# Patient Record
Sex: MALE | Race: WHITE | NOT HISPANIC OR LATINO | Employment: OTHER | URBAN - METROPOLITAN AREA
[De-identification: names, ages, dates, MRNs, and addresses within clinical notes are randomized per-mention and may not be internally consistent; named-entity substitution may affect disease eponyms.]

---

## 2021-11-08 ENCOUNTER — APPOINTMENT (OUTPATIENT)
Dept: CT IMAGING | Facility: HOSPITAL | Age: 76
End: 2021-11-08

## 2021-11-08 ENCOUNTER — APPOINTMENT (OUTPATIENT)
Dept: GENERAL RADIOLOGY | Facility: HOSPITAL | Age: 76
End: 2021-11-08

## 2021-11-08 ENCOUNTER — HOSPITAL ENCOUNTER (INPATIENT)
Facility: HOSPITAL | Age: 76
LOS: 4 days | Discharge: HOME | End: 2021-11-12
Attending: EMERGENCY MEDICINE | Admitting: INTERNAL MEDICINE

## 2021-11-08 DIAGNOSIS — I48.0 PAROXYSMAL ATRIAL FIBRILLATION WITH RAPID VENTRICULAR RESPONSE (HCC): ICD-10-CM

## 2021-11-08 DIAGNOSIS — R47.9 SPEECH DISTURBANCE, UNSPECIFIED TYPE: ICD-10-CM

## 2021-11-08 DIAGNOSIS — R03.0 ELEVATED BLOOD PRESSURE READING: ICD-10-CM

## 2021-11-08 DIAGNOSIS — G47.34 NOCTURNAL OXYGEN DESATURATION: ICD-10-CM

## 2021-11-08 DIAGNOSIS — I63.9 ACUTE CVA (CEREBROVASCULAR ACCIDENT) (HCC): Primary | ICD-10-CM

## 2021-11-08 PROBLEM — Z86.69 HISTORY OF GLAUCOMA: Status: ACTIVE | Noted: 2021-11-08

## 2021-11-08 PROBLEM — N40.0 BPH (BENIGN PROSTATIC HYPERPLASIA): Status: ACTIVE | Noted: 2021-11-08

## 2021-11-08 LAB
ALBUMIN SERPL-MCNC: 4.3 G/DL (ref 3.5–5.2)
ALBUMIN/GLOB SERPL: 1.7 G/DL
ALP SERPL-CCNC: 79 U/L (ref 39–117)
ALT SERPL W P-5'-P-CCNC: 19 U/L (ref 1–41)
ALT SERPL W P-5'-P-CCNC: 19 U/L (ref 1–41)
ANION GAP SERPL CALCULATED.3IONS-SCNC: 15 MMOL/L (ref 5–15)
APTT PPP: 33.9 SECONDS (ref 22–39)
AST SERPL-CCNC: 23 U/L (ref 1–40)
AST SERPL-CCNC: 26 U/L (ref 1–40)
BASOPHILS # BLD AUTO: 0.09 10*3/MM3 (ref 0–0.2)
BASOPHILS NFR BLD AUTO: 1.2 % (ref 0–1.5)
BILIRUB SERPL-MCNC: 0.7 MG/DL (ref 0–1.2)
BUN SERPL-MCNC: 17 MG/DL (ref 8–23)
BUN/CREAT SERPL: 16.2 (ref 7–25)
CALCIUM SPEC-SCNC: 9 MG/DL (ref 8.6–10.5)
CHLORIDE SERPL-SCNC: 100 MMOL/L (ref 98–107)
CO2 SERPL-SCNC: 22 MMOL/L (ref 22–29)
CREAT SERPL-MCNC: 1.05 MG/DL (ref 0.76–1.27)
DEPRECATED RDW RBC AUTO: 40.4 FL (ref 37–54)
EOSINOPHIL # BLD AUTO: 0.33 10*3/MM3 (ref 0–0.4)
EOSINOPHIL NFR BLD AUTO: 4.4 % (ref 0.3–6.2)
ERYTHROCYTE [DISTWIDTH] IN BLOOD BY AUTOMATED COUNT: 12.3 % (ref 12.3–15.4)
FLUAV RNA RESP QL NAA+PROBE: NOT DETECTED
FLUBV RNA RESP QL NAA+PROBE: NOT DETECTED
GFR SERPL CREATININE-BSD FRML MDRD: 69 ML/MIN/1.73
GLOBULIN UR ELPH-MCNC: 2.5 GM/DL
GLUCOSE BLDC GLUCOMTR-MCNC: 87 MG/DL (ref 70–130)
GLUCOSE BLDC GLUCOMTR-MCNC: 95 MG/DL (ref 70–130)
GLUCOSE SERPL-MCNC: 94 MG/DL (ref 65–99)
HCT VFR BLD AUTO: 44.5 % (ref 37.5–51)
HGB BLD-MCNC: 14.7 G/DL (ref 13–17.7)
HOLD SPECIMEN: NORMAL
IMM GRANULOCYTES # BLD AUTO: 0.02 10*3/MM3 (ref 0–0.05)
IMM GRANULOCYTES NFR BLD AUTO: 0.3 % (ref 0–0.5)
LYMPHOCYTES # BLD AUTO: 2.27 10*3/MM3 (ref 0.7–3.1)
LYMPHOCYTES NFR BLD AUTO: 30.3 % (ref 19.6–45.3)
MCH RBC QN AUTO: 29.8 PG (ref 26.6–33)
MCHC RBC AUTO-ENTMCNC: 33 G/DL (ref 31.5–35.7)
MCV RBC AUTO: 90.3 FL (ref 79–97)
MONOCYTES # BLD AUTO: 0.8 10*3/MM3 (ref 0.1–0.9)
MONOCYTES NFR BLD AUTO: 10.7 % (ref 5–12)
NEUTROPHILS NFR BLD AUTO: 3.97 10*3/MM3 (ref 1.7–7)
NEUTROPHILS NFR BLD AUTO: 53.1 % (ref 42.7–76)
NRBC BLD AUTO-RTO: 0 /100 WBC (ref 0–0.2)
PLATELET # BLD AUTO: 201 10*3/MM3 (ref 140–450)
PMV BLD AUTO: 11.2 FL (ref 6–12)
POTASSIUM SERPL-SCNC: 4.6 MMOL/L (ref 3.5–5.2)
PROT SERPL-MCNC: 6.8 G/DL (ref 6–8.5)
QT INTERVAL: 460 MS
QTC INTERVAL: 427 MS
RBC # BLD AUTO: 4.93 10*6/MM3 (ref 4.14–5.8)
SARS-COV-2 RNA RESP QL NAA+PROBE: NOT DETECTED
SODIUM SERPL-SCNC: 137 MMOL/L (ref 136–145)
TROPONIN T SERPL-MCNC: <0.01 NG/ML (ref 0–0.03)
WBC # BLD AUTO: 7.48 10*3/MM3 (ref 3.4–10.8)
WHOLE BLOOD HOLD SPECIMEN: NORMAL
WHOLE BLOOD HOLD SPECIMEN: NORMAL

## 2021-11-08 PROCEDURE — 71045 X-RAY EXAM CHEST 1 VIEW: CPT

## 2021-11-08 PROCEDURE — 99285 EMERGENCY DEPT VISIT HI MDM: CPT

## 2021-11-08 PROCEDURE — 93005 ELECTROCARDIOGRAM TRACING: CPT | Performed by: NURSE PRACTITIONER

## 2021-11-08 PROCEDURE — 70450 CT HEAD/BRAIN W/O DYE: CPT

## 2021-11-08 PROCEDURE — 84460 ALANINE AMINO (ALT) (SGPT): CPT | Performed by: EMERGENCY MEDICINE

## 2021-11-08 PROCEDURE — 93005 ELECTROCARDIOGRAM TRACING: CPT | Performed by: EMERGENCY MEDICINE

## 2021-11-08 PROCEDURE — 85025 COMPLETE CBC W/AUTO DIFF WBC: CPT | Performed by: EMERGENCY MEDICINE

## 2021-11-08 PROCEDURE — 99222 1ST HOSP IP/OBS MODERATE 55: CPT

## 2021-11-08 PROCEDURE — 82962 GLUCOSE BLOOD TEST: CPT

## 2021-11-08 PROCEDURE — 80053 COMPREHEN METABOLIC PANEL: CPT | Performed by: EMERGENCY MEDICINE

## 2021-11-08 PROCEDURE — 99291 CRITICAL CARE FIRST HOUR: CPT | Performed by: INTERNAL MEDICINE

## 2021-11-08 PROCEDURE — 85730 THROMBOPLASTIN TIME PARTIAL: CPT | Performed by: EMERGENCY MEDICINE

## 2021-11-08 PROCEDURE — 70498 CT ANGIOGRAPHY NECK: CPT

## 2021-11-08 PROCEDURE — 84484 ASSAY OF TROPONIN QUANT: CPT | Performed by: EMERGENCY MEDICINE

## 2021-11-08 PROCEDURE — 84450 TRANSFERASE (AST) (SGOT): CPT | Performed by: EMERGENCY MEDICINE

## 2021-11-08 PROCEDURE — 0 IOPAMIDOL PER 1 ML: Performed by: EMERGENCY MEDICINE

## 2021-11-08 PROCEDURE — 70496 CT ANGIOGRAPHY HEAD: CPT

## 2021-11-08 PROCEDURE — 25010000002 ALTEPLASE PER 1 MG

## 2021-11-08 PROCEDURE — 87636 SARSCOV2 & INF A&B AMP PRB: CPT | Performed by: NURSE PRACTITIONER

## 2021-11-08 PROCEDURE — 0042T HC CT CEREBRAL PERFUSION W/WO CONTRAST: CPT

## 2021-11-08 PROCEDURE — 3E03317 INTRODUCTION OF OTHER THROMBOLYTIC INTO PERIPHERAL VEIN, PERCUTANEOUS APPROACH: ICD-10-PCS

## 2021-11-08 RX ORDER — ASPIRIN 300 MG/1
300 SUPPOSITORY RECTAL DAILY
Status: DISCONTINUED | OUTPATIENT
Start: 2021-11-09 | End: 2021-11-08

## 2021-11-08 RX ORDER — ATORVASTATIN CALCIUM 40 MG/1
80 TABLET, FILM COATED ORAL NIGHTLY
Status: DISCONTINUED | OUTPATIENT
Start: 2021-11-08 | End: 2021-11-10

## 2021-11-08 RX ORDER — ASPIRIN 325 MG
325 TABLET ORAL DAILY
Status: DISCONTINUED | OUTPATIENT
Start: 2021-11-09 | End: 2021-11-08

## 2021-11-08 RX ORDER — SODIUM CHLORIDE 9 MG/ML
100 INJECTION, SOLUTION INTRAVENOUS CONTINUOUS
Status: DISCONTINUED | OUTPATIENT
Start: 2021-11-08 | End: 2021-11-09

## 2021-11-08 RX ORDER — SODIUM CHLORIDE 0.9 % (FLUSH) 0.9 %
10 SYRINGE (ML) INJECTION EVERY 12 HOURS SCHEDULED
Status: DISCONTINUED | OUTPATIENT
Start: 2021-11-08 | End: 2021-11-12 | Stop reason: HOSPADM

## 2021-11-08 RX ORDER — SODIUM CHLORIDE 0.9 % (FLUSH) 0.9 %
10 SYRINGE (ML) INJECTION AS NEEDED
Status: DISCONTINUED | OUTPATIENT
Start: 2021-11-08 | End: 2021-11-11

## 2021-11-08 RX ORDER — SODIUM CHLORIDE 0.9 % (FLUSH) 0.9 %
10 SYRINGE (ML) INJECTION AS NEEDED
Status: DISCONTINUED | OUTPATIENT
Start: 2021-11-08 | End: 2021-11-12 | Stop reason: HOSPADM

## 2021-11-08 RX ORDER — SODIUM CHLORIDE 9 MG/ML
100 INJECTION, SOLUTION INTRAVENOUS CONTINUOUS
Status: DISCONTINUED | OUTPATIENT
Start: 2021-11-08 | End: 2021-11-08

## 2021-11-08 RX ORDER — SODIUM CHLORIDE 9 MG/ML
100 INJECTION, SOLUTION INTRAVENOUS ONCE
Status: DISCONTINUED | OUTPATIENT
Start: 2021-11-08 | End: 2021-11-12

## 2021-11-08 RX ORDER — CLOPIDOGREL BISULFATE 75 MG/1
75 TABLET ORAL DAILY
Status: DISCONTINUED | OUTPATIENT
Start: 2021-11-09 | End: 2021-11-09

## 2021-11-08 RX ORDER — CLOPIDOGREL BISULFATE 75 MG/1
75 TABLET ORAL DAILY
Status: DISCONTINUED | OUTPATIENT
Start: 2021-11-10 | End: 2021-11-08 | Stop reason: SDUPTHER

## 2021-11-08 RX ORDER — ASPIRIN 300 MG/1
300 SUPPOSITORY RECTAL DAILY
Status: DISCONTINUED | OUTPATIENT
Start: 2021-11-09 | End: 2021-11-12 | Stop reason: HOSPADM

## 2021-11-08 RX ORDER — ASPIRIN 81 MG/1
81 TABLET ORAL DAILY
Status: DISCONTINUED | OUTPATIENT
Start: 2021-11-09 | End: 2021-11-12 | Stop reason: HOSPADM

## 2021-11-08 RX ORDER — DIPHENOXYLATE HYDROCHLORIDE AND ATROPINE SULFATE 2.5; .025 MG/1; MG/1
1 TABLET ORAL DAILY
COMMUNITY

## 2021-11-08 RX ADMIN — IOPAMIDOL 125 ML: 755 INJECTION, SOLUTION INTRAVENOUS at 13:21

## 2021-11-08 RX ADMIN — NICARDIPINE HYDROCHLORIDE 5 MG/HR: 0.1 INJECTION, SOLUTION INTRAVENOUS at 13:23

## 2021-11-08 RX ADMIN — ALTEPLASE 73 MG: KIT at 13:41

## 2021-11-08 RX ADMIN — SODIUM CHLORIDE, PRESERVATIVE FREE 10 ML: 5 INJECTION INTRAVENOUS at 20:19

## 2021-11-08 RX ADMIN — ATORVASTATIN CALCIUM 80 MG: 40 TABLET, FILM COATED ORAL at 20:19

## 2021-11-08 RX ADMIN — SODIUM CHLORIDE 100 ML/HR: 9 INJECTION, SOLUTION INTRAVENOUS at 15:05

## 2021-11-08 RX ADMIN — WATER 8 MG: 1 INJECTION INTRAMUSCULAR; INTRAVENOUS; SUBCUTANEOUS at 13:40

## 2021-11-08 NOTE — CASE MANAGEMENT/SOCIAL WORK
Discharge Planning Assessment  [facility]     Patient Name: Torsten Jackson  MRN: [id number]  Today's Date: 11/8/2021    Admit Date: 11/8/2021     Discharge Needs Assessment     Row Name 11/08/21 1352       Living Environment    Lives With spouse    Name(s) of Who Lives With Patient SURAJ Jackson (Spouse) [phone] (Mobile)    Current Living Arrangements home/apartment/condo    Potentially Unsafe Housing Conditions unable to assess    Primary Care Provided by self    Provides Primary Care For no one    Family Caregiver if Needed spouse    Family Caregiver Names SURAJ Jackson (Spouse) [phone] (Mobile)    Quality of Family Relationships helpful; involved; supportive    Able to Return to Prior Arrangements yes       Resource/Environmental Concerns    Resource/Environmental Concerns none    Transportation Concerns car, none       Transition Planning    Patient/Family Anticipates Transition to home    Patient/Family Anticipated Services at Transition none    Transportation Anticipated family or friend will provide       Discharge Needs Assessment    Readmission Within the Last 30 Days no previous admission in last 30 days    Equipment Currently Used at Home none    Concerns to be Addressed denies needs/concerns at this time; no discharge needs identified    Anticipated Changes Related to Illness none    Equipment Needed After Discharge none    Provided Post Acute Provider List? N/A    N/A Provider List Comment no need identified    Provided Post Acute Provider Quality & Resource List? N/A               Discharge Plan     Row Name 11/08/21 2722       Plan    Plan initial    Plan Comments Pt lives with his wife in [facility]. He is independent with ADL's, no DME or outpt needs. There is no medical hx on file. He plans home when ready. PCP is Lauren Shah.    Final Discharge Disposition Code 30 - still a patient              Continued Care and Services - Admitted Since 11/8/2021    Coordination has  not been started for this encounter.          Demographic Summary    No documentation.                Functional Status     Row Name 11/08/21 1352       Functional Status    Usual Activity Tolerance good       Functional Status, IADL    Medications independent    Meal Preparation independent    Housekeeping independent    Laundry independent    Shopping independent       Mental Status    General Appearance WDL WDL       Mental Status Summary    Recent Changes in Mental Status/Cognitive Functioning no changes       Employment/    Employment Status retired               Psychosocial    No documentation.                Abuse/Neglect    No documentation.                Legal    No documentation.                Substance Abuse    No documentation.                Patient Forms    No documentation.                   Cindy Dobbs RN

## 2021-11-08 NOTE — H&P
Intensivist Note     11/8/2021  Hospital Day: 0  * No surgery found *  ICU Stays Timeline            Hospital Admission: 11/08/21 1242 - Current  No ICU stays    No ICU stays to display             Mr. Torsten Jackson, 76 y.o. male is followed for:    Acute CVA manifesting only as expressive aphasia (HCC)    History of glaucoma crisis 2016 resolved post bilateral cataract extraction    BPH (benign prostatic hyperplasia).  Not on therapy       SUBJECTIVE     76-year-old  white male lifelong non-smoker with no prior medical problems except for BPH and glaucoma.  Patient indicates that he was well at 11:30 AM and was getting ready to go to the gym.  Subsequently developed transient dysarthria and expressive aphasia/word salad (wife indicates that she had difficulty understanding him).  Also apparently noted some mild tingling or numbness of the left upper extremity.  Came directly to [facility] ER and by the time of his arrival his symptoms had resolved and NIH was calculated of 0.  Blood pressure was noted however to be 196/86 (he does not have underlying hypertension).  At the time he arrived he had no unilateral weakness, sensory loss, diplopia or other visual abnormalities, loss of balance, and denied headache, nausea, vomiting.    Neurology evaluation in the ER found no focal abnormalities or dysarthria at the time of their exam.  CT the head revealed no acute intracranial findings or evidence of ICH.  CTA revealed irregularities throughout bilateral MCA territories, right M2 and M3 segments, and moderate to severe stenosis and irregularity with near-total occlusion of left M3.  CT perfusion was subsequently obtained revealing a small area of abnormal perfusion/reversible ischemia in the frontoparietal region in the left posterior MCA distribution.  He was an appropriate candidate for TPA and infusion begun at 1340.    I saw the patient approximately 30 to 35 minutes into his TPA infusion and he had developed  "recurrent onset of his expressive aphasia.  His wife indicated that it was not as severe as it had been at home, but it had completely cleared upon arrival in the ER and now had recurred despite TPA.       ROS: Generally negative although difficult to obtain because of his expressive aphasia    PMH:  Medical:  BPH  Glaucoma with crisis 2016    PSH:  Bilateral cataract extraction 2016    No current outpatient medications     No Known Allergies     FH: Mother  at 89.  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        OBJECTIVE     /67   Pulse 62   Resp 17   Ht 189.2 cm (74.5\")   Wt 89.8 kg (198 lb)   SpO2 98%   BMI 25.08 kg/m²           Flowsheet Rows      First Filed Value   Admission Height 189.2 cm (74.5\") Documented at 2021 1221   Admission Weight 89.8 kg (198 lb) Documented at 2021 1221        Intake & Output (last day)     None          Exam:  General Exam:  Well-developed well-nourished white male appearing younger than stated age  HEENT: Right pupil > left (chronic). Nose and throat clear.  Neck:                          Supple, no JVD, thyromegaly, or adenopathy  Lungs: Clear to auscultation and percussion anteriorly and posteriorly.  No wheezes, rales, rhonchi  Cardiovascular: RRR without murmurs or gallops.  HR 76 bpm  Abdomen: Soft nontender without organomegaly or masses.   and rectal: Deferred.  Extremities: No cyanosis clubbing edema.  Neurologic:                 Symmetric strength. No focal deficits.  Expressive aphasia/word salad present    CXR 2021: Normal    CT head 2021: Normal    CTA 2021: Irregularities throughout bilateral MCA territories, right M2 and M3 segments, and moderate to severe stenosis and irregularity with near-total occlusion of left M3    CTP 2021: Abnormal small " area of perfusion/ischemia in the left MCA distribution    EKG 11/8/2021: Normal except for mild left axis    INFUSIONS  niCARdipine, 5-15 mg/hr, Last Rate: Stopped (11/08/21 1434)        Results from last 7 days   Lab Units 11/08/21  1232   WBC 10*3/mm3 7.48   HEMOGLOBIN g/dL 14.7   HEMATOCRIT % 44.5   PLATELETS 10*3/mm3 201             Results from last 7 days   Lab Units 11/08/21  1232   ALT (SGPT) U/L 19   AST (SGOT) U/L 23       No results found for: SEDRATE  No results found for: BNP  Lab Results   Component Value Date    TROPONINT <0.010 11/08/2021     No results found for: TSH  No results found for: LACTATE  No results found for: CORTISOL            I reviewed the patient's results, images and medication.    Assessment/Plan   ASSESSMENT        Acute CVA manifesting only as expressive aphasia (HCC)    History of glaucoma crisis 2016 resolved post bilateral cataract extraction    BPH (benign prostatic hyperplasia).  Not on therapy      DISCUSSION: Has already received TPA by the time I saw him and yet expressive aphasia recurred.  Is being sent for emergent CT scan to rule out bleeding.    PLAN     1.  Status post TPA  2.  Plans will depend on follow-up CT scan  3.  Presume if no response to TPA or he worsens would be evaluated for thrombectomy  4.  Follow-up CT scan 24 hours  5.  In 24 hours start aspirin and Plavix if no intervention required  6.  High-dose statin  7.  Ulcer and DVT prophylaxis  8.  Observe for arrhythmias such as atrial fibrillation    Plan of care and goals reviewed with multidisciplinary team at daily rounds.    I discussed the patient's findings and my recommendations with patient, family, nursing staff and consulting provider    Patient is critically ill due to waxing and waning stroke symptoms despite TPA and has a high risk of life-threatening decline in condition.  They require continuous monitoring and frequent reassessment of condition for adjustment of management in order to  lessen risk.  I visited the patient's bedside and interacted with nurse numerous times today.    Time spent Critical care 60 min (It does not include procedure time).    Electronically signed by Lionel Santos MD, 11/08/21, 2:18 PM EST.   Pulmonary / Critical care medicine

## 2021-11-08 NOTE — ED PROVIDER NOTES
EMERGENCY DEPARTMENT ENCOUNTER    Pt Name: Torsten Jackson  MRN: [id number]  Pt :   1945  Room Number:    Date of encounter:  2021  PCP: Lauren Shah MD  ED Provider: Stoney Swain MD    Historian: Patient      HPI:  Chief Complaint: Speech abnormality and elevated blood pressure        Context: Torsten Jackson is a 76 y.o. male who presents to the ED c/o difficulty with speech starting just 20 minutes prior to arrival in the emergency department.  The patient states that he had a good breakfast including 2 cups of coffee.  He was getting ready to ride his motorcycle to the gym to workout.  He suddenly had difficulty with speech.  He denied any abnormalities in swallowing, ambulation.  He felt as if his left arm was a little different than his right but cannot be more specific.  He has had no loss of coordination of his arm and had no weakness in his hand or arm.  He denies sensory loss of his arm, face, leg.  He feels that his difficulty with speech has mostly resolved but can detect a small amount of difficulty still.    PAST MEDICAL HISTORY  No past medical history on file.      PAST SURGICAL HISTORY  No past surgical history on file.      FAMILY HISTORY  No family history on file.      SOCIAL HISTORY  Social History     Socioeconomic History   • Marital status:          ALLERGIES  Patient has no known allergies.        REVIEW OF SYSTEMS  Review of Systems       All systems reviewed and negative except for those discussed in HPI.       PHYSICAL EXAM    I have reviewed the triage vital signs and nursing notes.    ED Triage Vitals [21 1221]   Temp Heart Rate Resp BP SpO2   -- 55 16 (!) 196/86 98 %      Temp src Heart Rate Source Patient Position BP Location FiO2 (%)   -- Monitor Sitting Left arm --       Physical Exam  GENERAL:   Appears pleasant, younger than stated age appearing.  HENT: Nares patent.  EYES: No scleral icterus.  CV: Regular rhythm, regular rate.  No murmurs  gallops rubs.  No carotid bruits.  RESPIRATORY: Normal effort.  No audible wheezes, rales or rhonchi.  Clear to auscultation  ABDOMEN: Soft, nontender  MUSCULOSKELETAL: No deformities.   NEURO: Alert, moves all extremities, follows commands.  Oriented x3.  Speech is clear.  No aphasia.  Cranial nerves intact.  NIH equals 0.  SKIN: Warm, dry, no rash visualized.        LAB RESULTS  Recent Results (from the past 24 hour(s))   Troponin    Collection Time: 11/08/21 12:32 PM    Specimen: Blood   Result Value Ref Range    Troponin T <0.010 0.000 - 0.030 ng/mL   aPTT    Collection Time: 11/08/21 12:32 PM    Specimen: Blood   Result Value Ref Range    PTT 33.9 22.0 - 39.0 seconds   AST    Collection Time: 11/08/21 12:32 PM    Specimen: Blood   Result Value Ref Range    AST (SGOT) 23 1 - 40 U/L   ALT    Collection Time: 11/08/21 12:32 PM    Specimen: Blood   Result Value Ref Range    ALT (SGPT) 19 1 - 41 U/L   Green Top (Gel)    Collection Time: 11/08/21 12:32 PM   Result Value Ref Range    Extra Tube Hold for add-ons.    Lavender Top    Collection Time: 11/08/21 12:32 PM   Result Value Ref Range    Extra Tube hold for add-on    Gold Top - SST    Collection Time: 11/08/21 12:32 PM   Result Value Ref Range    Extra Tube Hold for add-ons.    Light Blue Top    Collection Time: 11/08/21 12:32 PM   Result Value Ref Range    Extra Tube hold for add-on    CBC Auto Differential    Collection Time: 11/08/21 12:32 PM    Specimen: Blood   Result Value Ref Range    WBC 7.48 3.40 - 10.80 10*3/mm3    RBC 4.93 4.14 - 5.80 10*6/mm3    Hemoglobin 14.7 13.0 - 17.7 g/dL    Hematocrit 44.5 37.5 - 51.0 %    MCV 90.3 79.0 - 97.0 fL    MCH 29.8 26.6 - 33.0 pg    MCHC 33.0 31.5 - 35.7 g/dL    RDW 12.3 12.3 - 15.4 %    RDW-SD 40.4 37.0 - 54.0 fl    MPV 11.2 6.0 - 12.0 fL    Platelets 201 140 - 450 10*3/mm3    Neutrophil % 53.1 42.7 - 76.0 %    Lymphocyte % 30.3 19.6 - 45.3 %    Monocyte % 10.7 5.0 - 12.0 %    Eosinophil % 4.4 0.3 - 6.2 %    Basophil  % 1.2 0.0 - 1.5 %    Immature Grans % 0.3 0.0 - 0.5 %    Neutrophils, Absolute 3.97 1.70 - 7.00 10*3/mm3    Lymphocytes, Absolute 2.27 0.70 - 3.10 10*3/mm3    Monocytes, Absolute 0.80 0.10 - 0.90 10*3/mm3    Eosinophils, Absolute 0.33 0.00 - 0.40 10*3/mm3    Basophils, Absolute 0.09 0.00 - 0.20 10*3/mm3    Immature Grans, Absolute 0.02 0.00 - 0.05 10*3/mm3    nRBC 0.0 0.0 - 0.2 /100 WBC   ECG 12 Lead    Collection Time: 11/08/21  1:21 PM   Result Value Ref Range    QT Interval 460 ms    QTC Interval 427 ms       If labs were ordered, I independently reviewed the results.        RADIOLOGY  CT Head Without Contrast Stroke Protocol    Result Date: 11/8/2021  EXAMINATION: CT HEAD WO CONTRAST STROKE PROTOCOL-  INDICATION: Neuro deficit, acute, stroke suspected.  TECHNIQUE: CT head without intravenous contrast following stroke protocol.  The radiation dose reduction device was turned on for each scan per the ALARA (As Low as Reasonably Achievable) protocol.  COMPARISON: None.  FINDINGS: Midline structures are symmetric without evidence of mass, mass effect or midline shift. Ventricles and sulci within normal limits. No intraaxial hemorrhage or extraaxial fluid collection. Globes and orbits are unremarkable. Paranasal sinuses and mastoid cells demonstrate mucosal edema and fluid within the ethmoid air cells along with mild to moderate mucosal thickening of the right maxillary sinus without air-fluid level present. Mastoid air cells are grossly clear and well pneumatized. Calvarium intact.      1. No acute intracranial finding specifically no acute intracranial hemorrhage. 2. Sinonasal mucosal disease centered within the right maxillary sinus and ethmoid air cells concerning for sinusitis or ethmoiditis.  NOTE: Scan performed on 11/08/2021 at 1238 hours. Scan report given to treatment team in person at scanner by Dr. Summers on 11/08/2021 at 1244 hours.  D:  11/08/2021 E:  11/08/2021       CT CEREBRAL PERFUSION WITH & WITHOUT  CONTRAST    Result Date: 11/8/2021  EXAMINATION: CT CEREBRAL PERFUSION W WO CONTRAST-11/08/2021:  INDICATION: Neuro deficit, acute, stroke suspected; R47.9-Unspecified speech disturbances; R03.0-Elevated blood-pressure reading, without diagnosis of hypertension.  TECHNIQUE: CT cerebral perfusion with and without intravenous contrast administration. Multiple parametric maps including mean transit time, time to drain, cerebral blood flow and cerebral blood volume performed.  The radiation dose reduction device was turned on for each scan per the ALARA (As Low as Reasonably Achievable) protocol.  COMPARISON: CT head noncontrast and concurrent CT angiogram.  FINDINGS: Asymmetric area of abnormal perfusion in the left middle cerebral artery territory posteriorly left frontal parietal involvement with prolongation of mean transit time and time to drain along with decreased cerebral blood flow, however, cerebral blood volume is preserved, likely small area of reversible ischemia without core infarct.      Abnormal perfusion with small area of left posterior MCA distribution frontoparietal region reversible ischemia.  D:  11/08/2021 E:  11/08/2021          I ordered and reviewed the above noted radiographic studies.      I viewed images of head CT which showed no acute bleed or obvious ischemic region per my independent interpretation.    See radiologist's dictation for official interpretation.        PROCEDURES    Critical Care  Performed by: Stoney Swain MD  Authorized by: Stoney Swain MD     Critical care provider statement:     Critical care time (minutes):  35    Critical care time was exclusive of:  Separately billable procedures and treating other patients    Critical care was necessary to treat or prevent imminent or life-threatening deterioration of the following conditions:  CNS failure or compromise    Critical care was time spent personally by me on the following activities:  Ordering and performing  treatments and interventions, ordering and review of laboratory studies, ordering and review of radiographic studies, pulse oximetry, re-evaluation of patient's condition, review of old charts, obtaining history from patient or surrogate, examination of patient, evaluation of patient's response to treatment, discussions with consultants and development of treatment plan with patient or surrogate        ECG 12 Lead   Final Result   Test Reason : Acute Stroke Protocol (onset < 12 hrs)   Blood Pressure :   */*   mmHG   Vent. Rate :  52 BPM     Atrial Rate :  52 BPM      P-R Int : 162 ms          QRS Dur :  90 ms       QT Int : 460 ms       P-R-T Axes :  58 -23  40 degrees      QTc Int : 427 ms      Sinus bradycardia   Possible Left atrial enlargement   Borderline ECG   No previous ECGs available   Confirmed by LEYDI CAPPS MD ([id number]) on 11/8/2021 1:54:42 PM      Referred By: GÉNESIS           Confirmed By: LEYDI CAPPS MD          MEDICATIONS GIVEN IN ER    Medications   sodium chloride 0.9 % flush 10 mL (has no administration in time range)   niCARdipine (CARDENE) 20 mg in 200 mL NS infusion (7.5 mg/hr Intravenous Rate/Dose Change 11/8/21 1332)   alteplase (ACTIVASE) 73 mg in sterile water (preservative free) 73 mL (1 mg/mL) infusion (73 mg Intravenous New Bag 11/8/21 1341)   sodium chloride 0.9 % infusion 100 mL (has no administration in time range)   iopamidol (ISOVUE-370) 76 % injection 150 mL (125 mL Intravenous Given 11/8/21 1321)   alteplase (ACTIVASE) 0.09 mg/kg = 8 mg in sterile water (preservative free) 8 mL bolus (8 mg Intravenous Given 11/8/21 1340)         PROGRESS, DATA ANALYSIS, CONSULTS, AND MEDICAL DECISION MAKING    All labs have been independently reviewed by me.  All radiology studies have been reviewed by me and the radiologist dictating the report.   EKG's have been independently viewed and interpreted by me.      Differential diagnoses: TIA versus CVA, etc.      ED Course as of 11/08/21 3231    Mon Nov 08, 2021   1317 I attended to the patient after being called to triage for patient evaluation.  The patient still has speech abnormalities but are almost completely resolved.  I treated the patient under code stroke protocol.  No IV TPA was administered initially as the patient's symptoms were significantly improved and NIH equals 0 while in the CT scanner. [MS]   1348 The patient's perfusion scan is positive for ischemia.  The case was discussed with Dr. Oquendo and Given who feel IV TPA is indicated at this point and we are currently infusing IV TPA after risks and benefits were discussed with the patient. [MS]   1350 I spoke with Dr. Bean, intensivist who will admit.   [MS]      ED Course User Index  [MS] Stoney Swain MD             AS OF 13:55 EST VITALS:    BP - 175/82  HR - 61  TEMP -    O2 SATS - 95%        DIAGNOSIS  Final diagnoses:   Speech disturbance, unspecified type   Elevated blood pressure reading   Acute CVA (cerebrovascular accident) (HCC)         DISPOSITION  Admission to ICU           Stoney Swain MD  11/08/21 2038

## 2021-11-08 NOTE — PROGRESS NOTES
"Neurology    Referring provider:   No referring provider defined for this encounter.    Reason for Consultation: Aphasia    Chief complaint: Aphasia    History of present illness: 76-year-old man seen for Dr. Santos for expressive aphasia.  This occurred transiently this morning with no receptive component but garbled speech.  He had some mild difficulty walking and mild problems with balance.    He denies headache focal numbness or weakness difficulty with swallowing or vision changes.    His health is otherwise excellent.  He has normal blood pressure denies palpitations no history of migraines.    Takes  no medication.        Review of Systems: Patient has glaucoma.    Home meds:   No medications prior to admission.       History  Past Medical History:   Diagnosis Date   • Glaucoma    , No past surgical history on file., No family history on file.,   Social History     Tobacco Use   • Smoking status: Never Smoker   • Smokeless tobacco: Never Used   Substance Use Topics   • Alcohol use: Not on file   • Drug use: Not on file    and Allergies:  Patient has no known allergies.,    Vital Signs   Blood pressure 148/82, pulse 59, temperature 97.7 °F (36.5 °C), temperature source Oral, resp. rate 16, height 189.2 cm (74.5\"), weight 89.8 kg (198 lb), SpO2 100 %.  Body mass index is 25.08 kg/m².    Physical Exam:   General: Pleasant white male              Head: No trauma              Neck: No bruit              Resp: Normal breath sound              Cor: Regular rhythm              Extremities: No edema              Skin: Warm and dry              Neuro: Mentally alert and oriented with normal memory attention and concentration.    Speech is articulate with no word finding problems.    Coordination is normal finger-nose and fine finger movements.    Reflexes are 1+ and equal bilaterally.    Motor testing shows normal power tone in all muscle groups.    Sensory testing is normal.        Results Review: EKG shows sinus " bradycardia and dilated left atrium.    T angiogram of the head neck showed no large vessel occlusion.  But did show moderate to severe stenosis and irregularity and near-total occlusion of the left M3 segment.  There is also irregularities of the right M2 and M3 segment.  With moderate to severe stenosis.    CT perfusion shows abnormal perfusion with a small amount of left posterior MCA distribution reversible ischemia.  No core infarct is mentioned.    Labs:  Lab Results (last 72 hours)     Procedure Component Value Units Date/Time    [place] Draw [[id number]] Collected: 11/08/21 1232    Specimen: Blood Updated: 11/08/21 1646    Narrative:      The following orders were created for panel order [place] Draw.  Procedure                               Abnormality         Status                     ---------                               -----------         ------                     Green Top (Gel)[[id number]]                                  Final result               Lavender Top[[id number]]                                     Final result               Gold Top - SST[[id number]]                                   Final result               [place] Top[[id number]]                                         Final result               Light Blue Top[[id number]]                                   Final result                 Please view results for these tests on the individual orders.    Gray Top [[id number]] Collected: 11/08/21 1232    Specimen: Blood Updated: 11/08/21 1646     Extra Tube Hold for add-ons.     Comment: Auto resulted.       Comprehensive Metabolic Panel [[id number]] Collected: 11/08/21 1232    Specimen: Blood Updated: 11/08/21 1624     Glucose 94 mg/dL      BUN 17 mg/dL      Creatinine 1.05 mg/dL      Sodium 137 mmol/L      Potassium 4.6 mmol/L      Comment: Slight hemolysis detected by analyzer. Results may be affected.        Chloride 100 mmol/L      CO2 22.0 mmol/L      Calcium 9.0 mg/dL      Total Protein 6.8  g/dL      Albumin 4.30 g/dL      ALT (SGPT) 19 U/L      AST (SGOT) 26 U/L      Alkaline Phosphatase 79 U/L      Total Bilirubin 0.7 mg/dL      eGFR Non African Amer 69 mL/min/1.73      Globulin 2.5 gm/dL      Comment: Calculated Result        A/G Ratio 1.7 g/dL      BUN/Creatinine Ratio 16.2     Anion Gap 15.0 mmol/L     Narrative:      GFR Normal >60  Chronic Kidney Disease <60  Kidney Failure <15      COVID PRE-OP / PRE-PROCEDURE SCREENING ORDER (NO ISOLATION) - Swab, Nasopharynx [[id number]]  (Normal) Collected: 11/08/21 1428    Specimen: Swab from Nasopharynx Updated: 11/08/21 1507    Narrative:      The following orders were created for panel order COVID PRE-OP / PRE-PROCEDURE SCREENING ORDER (NO ISOLATION) - Swab, Nasopharynx.  Procedure                               Abnormality         Status                     ---------                               -----------         ------                     COVID-19 and FLU A/B PCR...[[id number]]  Normal              Final result                 Please view results for these tests on the individual orders.    COVID-19 and FLU A/B PCR - Swab, Nasopharynx [[id number]]  (Normal) Collected: 11/08/21 1428    Specimen: Swab from Nasopharynx Updated: 11/08/21 1507     COVID19 Not Detected     Influenza A PCR Not Detected     Influenza B PCR Not Detected    Narrative:      Fact sheet for providers: https://www.fda.gov/media/326295/download    Fact sheet for patients: https://www.fda.gov/media/934601/download    Test performed by PCR.    AST [[id number]]  (Normal) Collected: 11/08/21 1232    Specimen: Blood Updated: 11/08/21 1352     AST (SGOT) 23 U/L     ALT [[id number]]  (Normal) Collected: 11/08/21 1232    Specimen: Blood Updated: 11/08/21 1352     ALT (SGPT) 19 U/L     Gold Top - SST [[id number]] Collected: 11/08/21 1232    Specimen: Blood Updated: 11/08/21 1345     Extra Tube Hold for add-ons.     Comment: Auto resulted.       Light Blue Top [[id number]] Collected: 11/08/21 1232     Specimen: Blood Updated: 11/08/21 1345     Extra Tube hold for add-on     Comment: Auto resulted       Green Top (Gel) [[id number]] Collected: 11/08/21 1232    Specimen: Blood Updated: 11/08/21 1345     Extra Tube Hold for add-ons.     Comment: Auto resulted.       Lavender Top [[id number]] Collected: 11/08/21 1232    Specimen: Blood Updated: 11/08/21 1345     Extra Tube hold for add-on     Comment: Auto resulted       Troponin [[id number]]  (Normal) Collected: 11/08/21 1232    Specimen: Blood Updated: 11/08/21 1309     Troponin T <0.010 ng/mL     Narrative:      Troponin T Reference Range:  <= 0.03 ng/mL-   Negative for AMI  >0.03 ng/mL-     Abnormal for myocardial necrosis.  Clinicians would have to utilize clinical acumen, EKG, Troponin and serial changes to determine if it is an Acute Myocardial Infarction or myocardial injury due to an underlying chronic condition.       Results may be falsely decreased if patient taking Biotin.      aPTT [[id number]]  (Normal) Collected: 11/08/21 1232    Specimen: Blood Updated: 11/08/21 1309     PTT 33.9 seconds     Narrative:      PTT = The equivalent PTT values for the therapeutic range of heparin levels at 0.3 to 0.5 U/ml are 55 to 70 seconds.    CBC & Differential [[id number]]  (Normal) Collected: 11/08/21 1232    Specimen: Blood Updated: 11/08/21 1250    Narrative:      The following orders were created for panel order CBC & Differential.  Procedure                               Abnormality         Status                     ---------                               -----------         ------                     CBC Auto Differential[[id number]]        Normal              Final result                 Please view results for these tests on the individual orders.    CBC Auto Differential [[id number]]  (Normal) Collected: 11/08/21 1232    Specimen: Blood Updated: 11/08/21 1250     WBC 7.48 10*3/mm3      RBC 4.93 10*6/mm3      Hemoglobin 14.7 g/dL      Hematocrit 44.5 %      MCV  90.3 fL      MCH 29.8 pg      MCHC 33.0 g/dL      RDW 12.3 %      RDW-SD 40.4 fl      MPV 11.2 fL      Platelets 201 10*3/mm3      Neutrophil % 53.1 %      Lymphocyte % 30.3 %      Monocyte % 10.7 %      Eosinophil % 4.4 %      Basophil % 1.2 %      Immature Grans % 0.3 %      Neutrophils, Absolute 3.97 10*3/mm3      Lymphocytes, Absolute 2.27 10*3/mm3      Monocytes, Absolute 0.80 10*3/mm3      Eosinophils, Absolute 0.33 10*3/mm3      Basophils, Absolute 0.09 10*3/mm3      Immature Grans, Absolute 0.02 10*3/mm3      nRBC 0.0 /100 WBC           Rads:  Imaging Results (Last 72 Hours)     Procedure Component Value Units Date/Time    CT Head Without Contrast [[id number]] Collected: 11/08/21 1418     Updated: 11/08/21 1430    Narrative:      EXAMINATION: CT HEAD WO CONTRAST-11/08/2021:      INDICATION: TPA; I63.9-Cerebral infarction, unspecified;  R47.9-Unspecified speech disturbances; R03.0-Elevated blood-pressure  reading, without diagnosis of hypertension.      TECHNIQUE: CT head without intravenous contrast.     The radiation dose reduction device was turned on for each scan per the  ALARA (As Low as Reasonably Achievable) protocol.     COMPARISON: CT angiogram and perfusion performed earlier same day.     FINDINGS: The midline structures are symmetric without evidence of mass,  mass effect or midline shift. No intra-axial hemorrhage or extra-axial  fluid collection. Status post TPA administration. Globes and orbits are  unremarkable. Sinonasal mucosal disease again noted. Calvarium intact.       Impression:      No interval change or acute findings specifically, no  intracranial hemorrhage. Status post TPA administration. No midline  shift or hydrocephalus.     D:  11/08/2021  E:  11/08/2021             XR Chest 1 View [[id number]] Collected: 11/08/21 1415     Updated: 11/08/21 1423    Narrative:      EXAMINATION: XR CHEST 1 VW- 11/08/2021     INDICATION: Acute Stroke Protocol (onset < 12 hrs);  I63.9-Cerebral  infarction, unspecified; R47.9-Unspecified speech disturbances;  R03.0-Elevated blood-pressure reading, without diagnosis of hypertension        COMPARISON: NONE     FINDINGS: Single view of the chest reveals cardiac size within normal  limits. Pulmonary vascularity within normal limits. No focal  opacification or consolidation. No pneumothorax or pleural effusion. No  acute osseous findings.       Impression:      No acute cardiopulmonary process.     D: 11/08/2021  E: 11/08/2021          CT Angiogram Head w AI Analysis of LVO [[id number]] Collected: 11/08/21 1349     Updated: 11/08/21 1414    Narrative:      EXAMINATION: CT ANGIOGRAM HEAD W AI ANALYSIS OF LVO-, CT ANGIOGRAM  NECK-11/08/2021:      INDICATION: Acute Stroke; R47.9-Unspecified speech disturbances;  R03.0-Elevated blood-pressure reading, without diagnosis of  hypertension.      TECHNIQUE: CT angiogram head and neck with and without intravenous  contrast administration.     The radiation dose reduction device was turned on for each scan per the  ALARA (As Low as Reasonably Achievable) protocol.     AI analysis of LVO was utilized.     COMPARISON: CT cerebral perfusion and CT head, noncontrast, stroke  protocol head.     FINDINGS:      CTA NECK: Normal 3-vessel arch with patent great vessel origins.  Proximal subclavian arteries are patent. Vertebral arteries demonstrate  a right-sided dominance of caliber without focal severe stenosis,  aneurysm or occlusion. The carotids demonstrate grossly normal course  and branching pattern with mild-to-moderate atherosclerotic plaque  formation and calcifications as there is 10% right and 30% left luminal  narrowing as measured by NASCET criteria with patency of the distal  internal carotid arteries through the intracranial portions discussed  further below. Cervical soft tissue is unremarkable. The thyroid is  homogeneous in attenuation. Lung apices are grossly clear apart from  mild biapical  pleural-parenchymal scarring and chronic change.      CTA HEAD: Distal internal carotid arteries demonstrate mild-to-moderate  atherosclerotic calcific disease, left slightly greater than right,  without distinct occlusion. Middle cerebral arteries demonstrate  irregularities throughout the bilateral MCA territories, right M2 and M3  segments, moderate-to-severe stenoses and irregularity along with area  of severe stenosis and near total occlusion left M3 segment, for example  series 9-image 59. Vertebrobasilar system and posterior cerebral  arteries demonstrate mild irregularities of atherosclerotic disease  without focal severe stenosis, aneurysm or occlusion. Venous structures  unremarkable with superior sagittal sinus grossly patent.       Impression:      Although no large vessel occlusion, middle cerebral arteries  demonstrate irregularities throughout the bilateral MCA territories,  right M2 and M3 segments, moderate-to-severe stenoses and irregularity  along with area of severe stenosis and near total occlusion left M3  segment, for example series 9- image 59.       D:  11/08/2021  E:  11/08/2021          CT Angiogram Neck [[id number]] Collected: 11/08/21 1349     Updated: 11/08/21 1414    Narrative:      EXAMINATION: CT ANGIOGRAM HEAD W AI ANALYSIS OF LVO-, CT ANGIOGRAM  NECK-11/08/2021:      INDICATION: Acute Stroke; R47.9-Unspecified speech disturbances;  R03.0-Elevated blood-pressure reading, without diagnosis of  hypertension.      TECHNIQUE: CT angiogram head and neck with and without intravenous  contrast administration.     The radiation dose reduction device was turned on for each scan per the  ALARA (As Low as Reasonably Achievable) protocol.     AI analysis of LVO was utilized.     COMPARISON: CT cerebral perfusion and CT head, noncontrast, stroke  protocol head.     FINDINGS:      CTA NECK: Normal 3-vessel arch with patent great vessel origins.  Proximal subclavian arteries are patent. Vertebral  arteries demonstrate  a right-sided dominance of caliber without focal severe stenosis,  aneurysm or occlusion. The carotids demonstrate grossly normal course  and branching pattern with mild-to-moderate atherosclerotic plaque  formation and calcifications as there is 10% right and 30% left luminal  narrowing as measured by NASCET criteria with patency of the distal  internal carotid arteries through the intracranial portions discussed  further below. Cervical soft tissue is unremarkable. The thyroid is  homogeneous in attenuation. Lung apices are grossly clear apart from  mild biapical pleural-parenchymal scarring and chronic change.      CTA HEAD: Distal internal carotid arteries demonstrate mild-to-moderate  atherosclerotic calcific disease, left slightly greater than right,  without distinct occlusion. Middle cerebral arteries demonstrate  irregularities throughout the bilateral MCA territories, right M2 and M3  segments, moderate-to-severe stenoses and irregularity along with area  of severe stenosis and near total occlusion left M3 segment, for example  series 9-image 59. Vertebrobasilar system and posterior cerebral  arteries demonstrate mild irregularities of atherosclerotic disease  without focal severe stenosis, aneurysm or occlusion. Venous structures  unremarkable with superior sagittal sinus grossly patent.       Impression:      Although no large vessel occlusion, middle cerebral arteries  demonstrate irregularities throughout the bilateral MCA territories,  right M2 and M3 segments, moderate-to-severe stenoses and irregularity  along with area of severe stenosis and near total occlusion left M3  segment, for example series 9- image 59.       D:  11/08/2021  E:  11/08/2021          CT CEREBRAL PERFUSION WITH & WITHOUT CONTRAST [[id number]] Collected: 11/08/21 1343     Updated: 11/08/21 1348    Narrative:      EXAMINATION: CT CEREBRAL PERFUSION W WO CONTRAST-11/08/2021:      INDICATION: Neuro deficit,  acute, stroke suspected; R47.9-Unspecified  speech disturbances; R03.0-Elevated blood-pressure reading, without  diagnosis of hypertension.      TECHNIQUE: CT cerebral perfusion with and without intravenous contrast  administration. Multiple parametric maps including mean transit time,  time to drain, cerebral blood flow and cerebral blood volume performed.     The radiation dose reduction device was turned on for each scan per the  ALARA (As Low as Reasonably Achievable) protocol.     COMPARISON: CT head noncontrast and concurrent CT angiogram.     FINDINGS: Asymmetric area of abnormal perfusion in the left middle  cerebral artery territory posteriorly left frontal parietal involvement  with prolongation of mean transit time and time to drain along with  decreased cerebral blood flow, however, cerebral blood volume is  preserved, likely small area of reversible ischemia without core  infarct.       Impression:      Abnormal perfusion with small area of left posterior MCA  distribution frontoparietal region reversible ischemia.     D:  11/08/2021  E:  11/08/2021             CT Head Without Contrast Stroke Protocol [[id number]] Collected: 11/08/21 1253     Updated: 11/08/21 1259    Narrative:      EXAMINATION: CT HEAD WO CONTRAST STROKE PROTOCOL-      INDICATION: Neuro deficit, acute, stroke suspected.      TECHNIQUE: CT head without intravenous contrast following stroke  protocol.     The radiation dose reduction device was turned on for each scan per the  ALARA (As Low as Reasonably Achievable) protocol.     COMPARISON: None.     FINDINGS: Midline structures are symmetric without evidence of mass,  mass effect or midline shift. Ventricles and sulci within normal limits.  No intraaxial hemorrhage or extraaxial fluid collection. Globes and  orbits are unremarkable. Paranasal sinuses and mastoid cells demonstrate  mucosal edema and fluid within the ethmoid air cells along with mild to  moderate mucosal thickening of  the right maxillary sinus without  air-fluid level present. Mastoid air cells are grossly clear and well  pneumatized. Calvarium intact.       Impression:      1. No acute intracranial finding specifically no acute intracranial  hemorrhage.  2. Sinonasal mucosal disease centered within the right maxillary sinus  and ethmoid air cells concerning for sinusitis or ethmoiditis.     NOTE: Scan performed on 11/08/2021 at 1238 hours. Scan report given to  treatment team in person at scanner by Dr. Summers on 11/08/2021 at 1244  hours.     D:  11/08/2021  E:  11/08/2021                  Assessment: Acute stroke, left MCA territory, aborted post TPA    EKG with indications of a dilated left atrium.           Plan:    MRI brain without infusion.    Transthoracic echocardiogram.    Carotid duplex.    Aspirin and Plavix 24 hours post TPA.        Comment:   Aborted stroke questionable etiology.    The dilated left atrium is worrisome for possible atrial fibrillation.    CTA suggests significant intracranial atherosclerosis and small to medium sized vessels.        I discussed the patients findings and my recommendations with patient, family and nursing staff      Matias Cherry MD  11/08/21  17:20 EST

## 2021-11-08 NOTE — CONSULTS
Stroke Consult Note    Patient Name: Torsten Jackson   MRN: [id number]  Age: 76 y.o.  Sex: male  : 1945    Primary Care Physician: No primary care provider on file.  Referring Physician: Dr. Stoney Swain    TIME STROKE TEAM CALLED: 1235 EST     TIME PATIENT SEEN: 1239 EST    Handedness: Right  Race:     Chief Complaint/Reason for Consultation: Transient dysarthria and aphasia    Subjective .  HPI: Mr. Jackson is a 76-year-old right-handed  male with known medical diagnosis of BPH and glaucoma who presents to emergency department for further evaluation of transient episode of dysarthria and aphasia which occurred at 1130 this morning.  He states when his symptoms began he had just finished breakfast and was getting ready to go to the wellness center to work out.  On arrival to the emergency department a code stroke was initiated.  His blood pressure in triage was noted to be 196/86, which the patient says is unusual for him.  He denies experiencing any unilateral weakness, blurry vision/double vision/loss of vision, facial drooping, loss of balance, or headache.  He does however note that he had left upper extremity felt tingly/numb at the time of symptom onset.  The patient states he is healthy and does not take any daily medications.    Last Known Normal Date/Time: 1130 EST     Review of Systems   Constitutional: Negative for activity change, chills, fatigue and fever.   HENT: Negative for congestion, rhinorrhea, sore throat and trouble swallowing.    Eyes: Negative for photophobia and visual disturbance.   Respiratory: Negative for cough, chest tightness and shortness of breath.    Cardiovascular: Negative for chest pain and palpitations.   Gastrointestinal: Negative for blood in stool, constipation, diarrhea, nausea and vomiting.   Endocrine: Negative.    Genitourinary: Positive for difficulty urinating and frequency. Negative for hematuria.   Musculoskeletal: Negative for arthralgias  and gait problem.   Skin: Negative.    Neurological: Positive for speech difficulty and numbness. Negative for dizziness, seizures, facial asymmetry, weakness and headaches.   Hematological: Negative.    Psychiatric/Behavioral: Negative.       No past medical history on file.  No past surgical history on file.  No family history on file.     No Known Allergies  Prior to Admission medications    Not on File           Objective     Heart Rate:  [55] 55  Resp:  [16] 16  BP: (196)/(86) 196/86  Neurological Exam  Mental Status  Awake, alert and oriented to person, place and time.Alert. Oriented to person, place, time and situation. Recent and remote memory are intact. Speech is normal. Language is fluent with no aphasia. Mild loss of fluency per wife. Attention and concentration are normal.    Cranial Nerves  CN II: Vision test: Anisocoria, chronic.  Right pupil 5 mm, left pupil 2 mm  Right pupil nonreactive  . Visual fields full to confrontation.  CN III, IV, VI: Extraocular movements intact bilaterally.   Right pupil: 2 mm. Reactive to light.   Left pupil: 5 mm. Abnormal reactivity: Chronic secondary to acute glaucoma in the past.  CN V: Facial sensation is normal.  CN VII: Full and symmetric facial movement.  CN VIII: Hearing intact bilaterally.  CN IX, X: Palate elevates symmetrically  CN XI: Shoulder shrug strength is normal.  CN XII: Tongue midline without atrophy or fasciculations.    Motor  Normal muscle bulk throughout. No fasciculations present. Normal muscle tone. Strength is 5/5 throughout all four extremities.    Sensory  Sensation is intact to light touch, pinprick, vibration and proprioception in all four extremities.    Coordination  Finger-to-nose, rapid alternating movements and heel-to-shin normal bilaterally without dysmetria.    Gait  Not assessed.      Physical Exam  Constitutional:       General: He is not in acute distress.     Appearance: Normal appearance. He is not ill-appearing.   HENT:       Head: Normocephalic and atraumatic.      Mouth/Throat:      Mouth: Mucous membranes are moist.      Pharynx: Oropharynx is clear.   Eyes:      Extraocular Movements: Extraocular movements intact.      Comments: Anisocoria, chronic.  Right pupil 5 mm, left pupil 2 mm  Right pupil nonreactive     Cardiovascular:      Rate and Rhythm: Regular rhythm. Bradycardia present.   Pulmonary:      Effort: Pulmonary effort is normal. No respiratory distress.      Comments: On room air  Skin:     General: Skin is warm and dry.   Neurological:      General: No focal deficit present.      Mental Status: He is alert.      Coordination: Coordination is intact.      Deep Tendon Reflexes: Strength normal.   Psychiatric:         Mood and Affect: Mood normal.         Speech: Speech normal.         Behavior: Behavior normal.       Acute Stroke Data    IV Thrombolytic (TPA/Tenecteplase) Inclusion / Exclusion Criteria    Time: 13:06 EST  Person Administering Scale: PORTER Ferro    Inclusion Criteria  [x]   18 years of age or greater   [x]   Onset of symptoms < 4.5 hours before beginning treatment (stroke onset = time patient was last seen well or without symptoms).   []   Diagnosis of acute ischemic stroke causing measurable disabling deficit (Complete Hemianopia, Any Aphasia, Visual or Sensory Extinction, Any weakness limiting sustained effort against gravity)   []   Any remaining deficit considered potentially disabling in view of patient and practitioner   Exclusion criteria (Do not proceed with Alteplase if any are checked under exclusion criteria)  []   Onset unknown or GREATER than 4.5 hours   []   ICH on CT/MRI   []   CT demonstrates hypodensity representing acute or subacute infarct   []   Significant head trauma or prior stroke in the previous 3 months   []   Symptoms suggestive of subarachnoid hemorrhage   []   History of un-ruptured intracranial aneurysm GREATER than 10 mm   []   Recent intracranial or  intraspinal surgery within the last 3 months   []   Arterial puncture at a non-compressible site in the previous 7 days   []   Active internal bleeding   []   Acute bleeding tendency   []   Platelet count LESS than 100,000 for known hematological diseases such as leukemia, thrombocytopenia or chronic cirrhosis   []   Current use of anticoagulant with INR GREATER than 1.7 or PT GREATER than 15 seconds, aPTT GREATER than 40 seconds   []   Heparin received within 48 hours, resulting in abnormally elevated aPTT GREATER than upper limit of normal   []   Current use of direct thrombin inhibitors or direct factor Xa inhibitors in the past 48 hours   []   Elevated blood pressure refractory to treatment (systolic GREATER than 185 mm/Hg or diastolic  GREATER than 110 mm/Hg   []   Suspected infective endocarditis and aortic arch dissection   []   Current use of therapeutic treatment dose of low-molecular-weight heparin (LMWH) within the previous 24 hours   []   Structural GI malignancy or bleed   Relative exclusion for all patients  []   Only minor non-disabling symptoms   []   Pregnancy   []   Seizure at onset with postictal residual neurological impairments   []   Major surgery or previous trauma within past 14 days   []   History of previous spontaneous ICH, intracranial neoplasm, or AV malformation   []   Postpartum (within previous 14 days)   []   Recent GI or urinary tract hemorrhage (within previous 21 days)   []   Recent acute MI (within previous 3 months)   []   History of un-ruptured intracranial aneurysm LESS than 10 mm   []   History of ruptured intracranial aneurysm   []   Blood glucose LESS than 50 mg/dL (2.7 mmol/L)   []   Dural puncture within the last 7 days   []   Known GREATER than 10 cerebral microbleeds   Additional exclusions for patients with symptoms onset between 3 and 4.5 hours.  []   Age > 80.   []   On any anticoagulants regardless of INR  >>> Warfarin (Coumadin), Heparin, Enoxaparin (Lovenox),  fondaparinux (Arixtra), bivalirudin (Angiomax), Argatroban, dabigatran (Pradaxa), rivaroxaban (Xarelto), or apixaban (Eliquis)   []   Severe stroke (NIHSS > 25).   []   History of BOTH diabetes and previous ischemic stroke.   [x]   The risks and benefits have been discussed with the patient or family related to the administration of IV Alteplase for stroke symptoms.   [x]   I have discussed and reviewed the patient's case and imaging with the attending prior to IV Thrombolytic (TPA/Tenecteplase).   1340 Time Thrombolytic administered     On my assessment I do not appreciate any speech difficulties however the patient feels that his speech is not completely back to his baseline (loss of fluency).  I discussed the option of IV alteplase therapy with the patient and his wife, currently at bedside, including risks/benefits and they would like to proceed with administration as speech difficulties would be disabling to him.    There was a delay in IV alteplase administration secondary to patient decision making as well as elevated BP.  BP at time of IV alteplase initiation was 172/74.    Thrombolytic Therapy for Stroke  Time: 1300    Inclusion criteria:  Onset of symptoms < 4.5 hours before beginning treatment. Last known well time < 4.5 hours before beginning treatment. Pt is at least 18 years of age.    Exclusion criteria:  Historical: None.  Clinical: None.  Hematologic: None.  Head CT scan: None.  Relative exclusion criteria: Only minor and isolated neurologic signs. Rapidly improving stroke symptoms.  Additional relative exclusion criteria for treatment from 3-4.5 hours from symptom onset: None.    Current exclusion criteria have been reviewed.  Current inclusion criteria have been reviewed and met.    tPA given. Intensive monitoring performed:  blood pressure, cardiac monitoring, O2 sat and neuro exam.  Complications: None.     Hospital Meds:  Scheduled-    Infusions-     PRNs- •  sodium chloride    Functional  Status Prior to Current Stroke/[place] Score: 0    NIH Stroke Scale  Time: 13:06 EST  Person Administering Scale: PORTER Ferro    Interval: baseline  1a. Level of Consciousness: 0-->Alert, keenly responsive  1b. LOC Questions: 0-->Answers both questions correctly  1c. LOC Commands: 0-->Performs both tasks correctly  2. Best Gaze: 0-->Normal  3. Visual: 0-->No visual loss  4. Facial Palsy: 0-->Normal symmetrical movements  5a. Motor Arm, Left: 0-->No drift, limb holds 90 (or 45) degrees for full 10 secs  5b. Motor Arm, Right: 0-->No drift, limb holds 90 (or 45) degrees for full 10 secs  6a. Motor Leg, Left: 0-->No drift, leg holds 30 degree position for full 5 secs  6b. Motor Leg, Right: 0-->No drift, leg holds 30 degree position for full 5 secs  7. Limb Ataxia: 0-->Absent  8. Sensory: 0-->Normal, no sensory loss  9. Best Language: 0-->No aphasia, normal  10. Dysarthria: 0-->Normal  11. Extinction and Inattention (formerly Neglect): 0-->No abnormality    Total (NIH Stroke Scale): 0      Results Reviewed:  I have personally reviewed current lab, radiology, and data and agree with results.    CT of the head without contrast is negative for hemorrhage and/acute process.    CTA head/neck reveals left ICA atherosclerosis, right PCA stenosis, and bilateral MCA irregularities (moderate to severe right M2/M3 and severe/near total occlusion left M3) however no evidence target vessel for endovascular therapy.    CT perfusion reveals perfusion deficit within the left posterior MCA territory without core infarct however no target vessel which is amendable to endovascular therapy.        Assessment/Plan:    This is a 76-year-old right-handed  male with known medical diagnosis of BPH and glaucoma who presents to the emergency department for further evaluation after experiencing a transient episode of aphasia and dysarthria.  He was deemed a candidate for IV alteplase therapy which was administered at 1340.   He is not a candidate for endovascular therapy secondary to no evidence of flow-limiting stenosis or large vessel occlusive stroke present on CTA head/neck and CT perfusion.      1. Acute ischemic stroke, left hemispheric, etiology likely large vessel atherosclerotic disease  -TIA/CVA order set with thrombolytic therapy has been initiated per protocol  -MRI brain without contrast  -24 hour post-IV alteplase CT head without contrast on 11/9 @ 1440  -N.p.o. until bedside nursing dysphagia screen completed, then the patient will be started on a cardiac healthy diet  -A1c and lipid panel  -TTE  -PT/OT evaluation tomorrow  -ASA 81 mg daily with Plavix 300mg load followed by 75mg daily if 24 hour CT head without contrast is negative for hemorrhage  -Atorvastatin 80 mg nightly  -Strict BP monitoring, <180/105  -Nicardipine for SBP >180/105    Patient was discussed with Dr. Cherry and Dr. Rubio who recommend treatment with IV alteplase.  Plan of care was discussed with the patient, his wife, primary RN, and Dr. Swain.  He will be admitted to the neurological ICU for post-alteplase monitoring and further TIA/CVA work-up.  Stroke neurology will continue to follow.    Miguelinacari Waite, APRN  November 8, 2021  12:51 EST    ADDENDUM 1412: Per nursing the patient had return of symptoms while speaking with Dr. Santos.  STAT CT head without was obtained which was negative for hemorrhage.  Dr. Cherry notified and will see patient shortly.

## 2021-11-09 ENCOUNTER — APPOINTMENT (OUTPATIENT)
Dept: CARDIOLOGY | Facility: HOSPITAL | Age: 76
End: 2021-11-09

## 2021-11-09 ENCOUNTER — APPOINTMENT (OUTPATIENT)
Dept: CT IMAGING | Facility: HOSPITAL | Age: 76
End: 2021-11-09

## 2021-11-09 ENCOUNTER — APPOINTMENT (OUTPATIENT)
Dept: MRI IMAGING | Facility: HOSPITAL | Age: 76
End: 2021-11-09

## 2021-11-09 LAB
BH CV ECHO MEAS - AO MAX PG (FULL): 3.9 MMHG
BH CV ECHO MEAS - AO MAX PG: 10.2 MMHG
BH CV ECHO MEAS - AO MEAN PG (FULL): 2.1 MMHG
BH CV ECHO MEAS - AO MEAN PG: 5.3 MMHG
BH CV ECHO MEAS - AO ROOT AREA (BSA CORRECTED): 1.6
BH CV ECHO MEAS - AO ROOT AREA: 9.6 CM^2
BH CV ECHO MEAS - AO ROOT DIAM: 3.5 CM
BH CV ECHO MEAS - AO V2 MAX: 159.8 CM/SEC
BH CV ECHO MEAS - AO V2 MEAN: 99 CM/SEC
BH CV ECHO MEAS - AO V2 VTI: 35.9 CM
BH CV ECHO MEAS - AVA(I,A): 2.4 CM^2
BH CV ECHO MEAS - AVA(I,D): 2.4 CM^2
BH CV ECHO MEAS - AVA(V,A): 2.6 CM^2
BH CV ECHO MEAS - AVA(V,D): 2.6 CM^2
BH CV ECHO MEAS - BSA(HAYCOCK): 2.2 M^2
BH CV ECHO MEAS - BSA(HAYCOCK): 2.2 M^2
BH CV ECHO MEAS - BSA: 2.2 M^2
BH CV ECHO MEAS - BSA: 2.2 M^2
BH CV ECHO MEAS - BZI_BMI: 25.2 KILOGRAMS/M^2
BH CV ECHO MEAS - BZI_BMI: 25.2 KILOGRAMS/M^2
BH CV ECHO MEAS - BZI_METRIC_HEIGHT: 188 CM
BH CV ECHO MEAS - BZI_METRIC_HEIGHT: 188 CM
BH CV ECHO MEAS - BZI_METRIC_WEIGHT: 88.9 KG
BH CV ECHO MEAS - BZI_METRIC_WEIGHT: 88.9 KG
BH CV ECHO MEAS - EDV(CUBED): 98.8 ML
BH CV ECHO MEAS - EDV(MOD-SP2): 62 ML
BH CV ECHO MEAS - EDV(MOD-SP4): 74 ML
BH CV ECHO MEAS - EDV(TEICH): 98.5 ML
BH CV ECHO MEAS - EF(CUBED): 73.7 %
BH CV ECHO MEAS - EF(MOD-BP): 57 %
BH CV ECHO MEAS - EF(MOD-SP2): 45.2 %
BH CV ECHO MEAS - EF(MOD-SP4): 67.6 %
BH CV ECHO MEAS - EF(TEICH): 65.5 %
BH CV ECHO MEAS - ESV(CUBED): 26 ML
BH CV ECHO MEAS - ESV(MOD-SP2): 34 ML
BH CV ECHO MEAS - ESV(MOD-SP4): 24 ML
BH CV ECHO MEAS - ESV(TEICH): 34 ML
BH CV ECHO MEAS - FS: 35.9 %
BH CV ECHO MEAS - IVS/LVPW: 0.81
BH CV ECHO MEAS - IVSD: 1.2 CM
BH CV ECHO MEAS - LA DIMENSION: 4.2 CM
BH CV ECHO MEAS - LA/AO: 1.2
BH CV ECHO MEAS - LAT PEAK E' VEL: 9.1 CM/SEC
BH CV ECHO MEAS - LATERAL E/E' RATIO: 11.3
BH CV ECHO MEAS - LV DIASTOLIC VOL/BSA (35-75): 34.3 ML/M^2
BH CV ECHO MEAS - LV IVRT: 0.09 SEC
BH CV ECHO MEAS - LV MASS(C)D: 177.1 GRAMS
BH CV ECHO MEAS - LV MASS(C)DI: 82.2 GRAMS/M^2
BH CV ECHO MEAS - LV MAX PG: 6.3 MMHG
BH CV ECHO MEAS - LV MEAN PG: 3.2 MMHG
BH CV ECHO MEAS - LV SYSTOLIC VOL/BSA (12-30): 11.1 ML/M^2
BH CV ECHO MEAS - LV V1 MAX: 125.6 CM/SEC
BH CV ECHO MEAS - LV V1 MEAN: 83.7 CM/SEC
BH CV ECHO MEAS - LV V1 VTI: 26.5 CM
BH CV ECHO MEAS - LVIDD: 4.6 CM
BH CV ECHO MEAS - LVIDS: 3.3 CM
BH CV ECHO MEAS - LVLD AP2: 8.1 CM
BH CV ECHO MEAS - LVLD AP4: 7.7 CM
BH CV ECHO MEAS - LVLS AP2: 7.6 CM
BH CV ECHO MEAS - LVLS AP4: 6.8 CM
BH CV ECHO MEAS - LVOT AREA (M): 3.5 CM^2
BH CV ECHO MEAS - LVOT AREA: 3.3 CM^2
BH CV ECHO MEAS - LVOT DIAM: 2.1 CM
BH CV ECHO MEAS - LVPWD: 1.2 CM
BH CV ECHO MEAS - MED PEAK E' VEL: 6.3 CM/SEC
BH CV ECHO MEAS - MEDIAL E/E' RATIO: 16.4
BH CV ECHO MEAS - MV A MAX VEL: 62.2 CM/SEC
BH CV ECHO MEAS - MV DEC TIME: 0.28 SEC
BH CV ECHO MEAS - MV E MAX VEL: 104.1 CM/SEC
BH CV ECHO MEAS - MV E/A: 1.7
BH CV ECHO MEAS - MV MAX PG: 7.7 MMHG
BH CV ECHO MEAS - MV MEAN PG: 1.6 MMHG
BH CV ECHO MEAS - MV V2 MAX: 138.7 CM/SEC
BH CV ECHO MEAS - MV V2 MEAN: 53.3 CM/SEC
BH CV ECHO MEAS - MV V2 VTI: 41.7 CM
BH CV ECHO MEAS - MVA(VTI): 2.1 CM^2
BH CV ECHO MEAS - PA ACC SLOPE: 814.1 CM/SEC^2
BH CV ECHO MEAS - PA ACC TIME: 0.09 SEC
BH CV ECHO MEAS - PA MAX PG: 4.6 MMHG
BH CV ECHO MEAS - PA PR(ACCEL): 37.8 MMHG
BH CV ECHO MEAS - PA V2 MAX: 107.5 CM/SEC
BH CV ECHO MEAS - RAP SYSTOLE: 3 MMHG
BH CV ECHO MEAS - RVSP: 25 MMHG
BH CV ECHO MEAS - SI(AO): 160.3 ML/M^2
BH CV ECHO MEAS - SI(CUBED): 33.8 ML/M^2
BH CV ECHO MEAS - SI(LVOT): 40.8 ML/M^2
BH CV ECHO MEAS - SI(MOD-SP2): 13 ML/M^2
BH CV ECHO MEAS - SI(MOD-SP4): 23.2 ML/M^2
BH CV ECHO MEAS - SI(TEICH): 30 ML/M^2
BH CV ECHO MEAS - SV(AO): 345.4 ML
BH CV ECHO MEAS - SV(CUBED): 72.8 ML
BH CV ECHO MEAS - SV(LVOT): 87.8 ML
BH CV ECHO MEAS - SV(MOD-SP2): 28 ML
BH CV ECHO MEAS - SV(MOD-SP4): 50 ML
BH CV ECHO MEAS - SV(TEICH): 64.5 ML
BH CV ECHO MEAS - TAPSE (>1.6): 1.9 CM
BH CV ECHO MEAS - TR MAX PG: 22 MMHG
BH CV ECHO MEAS - TR MAX VEL: 230.8 CM/SEC
BH CV ECHO MEASUREMENTS AVERAGE E/E' RATIO: 13.52
BH CV VAS BP RIGHT ARM: NORMAL MMHG
BH CV VAS TCD LEFT DISTAL M1: 32 CM/SEC
BH CV VAS TCD LEFT MID M1: 47 CM/SEC
BH CV VAS TCD LEFT PROXIMAL M1: 56 CM/SEC
BH CV VAS TCD LEFT TERMINAL ICA: 53 CM/SEC
BH CV VAS TCD RIGHT DISTAL M1: 52 CM/SEC
BH CV VAS TCD RIGHT MID M1: 44 CM/SEC
BH CV VAS TCD RIGHT PROXIMAL M1: 48 CM/SEC
BH CV VAS TCD RIGHT TERMINAL ICA: 66 CM/SEC
BH CV XLRA - RV BASE: 3.9 CM
BH CV XLRA - RV LENGTH: 8 CM
BH CV XLRA - RV MID: 3 CM
BH CV XLRA - TDI S': 14 CM/SEC
BH CV XLRA MEAS LEFT CCA RATIO VEL: 83.8 CM/SEC
BH CV XLRA MEAS LEFT DIST CCA EDV: 18.2 CM/SEC
BH CV XLRA MEAS LEFT DIST CCA PSV: 84.5 CM/SEC
BH CV XLRA MEAS LEFT DIST ICA EDV: 30.1 CM/SEC
BH CV XLRA MEAS LEFT DIST ICA PSV: 90.4 CM/SEC
BH CV XLRA MEAS LEFT ICA RATIO VEL: 82.1 CM/SEC
BH CV XLRA MEAS LEFT ICA/CCA RATIO: 0.98
BH CV XLRA MEAS LEFT MID CCA EDV: 18.9 CM/SEC
BH CV XLRA MEAS LEFT MID CCA PSV: 120.1 CM/SEC
BH CV XLRA MEAS LEFT MID ICA EDV: 26.6 CM/SEC
BH CV XLRA MEAS LEFT MID ICA PSV: 82.5 CM/SEC
BH CV XLRA MEAS LEFT PROX CCA EDV: 19.6 CM/SEC
BH CV XLRA MEAS LEFT PROX CCA PSV: 146 CM/SEC
BH CV XLRA MEAS LEFT PROX ECA EDV: 14.7 CM/SEC
BH CV XLRA MEAS LEFT PROX ECA PSV: 127.8 CM/SEC
BH CV XLRA MEAS LEFT PROX ICA EDV: 14.4 CM/SEC
BH CV XLRA MEAS LEFT PROX ICA PSV: 81.6 CM/SEC
BH CV XLRA MEAS LEFT PROX SCLA PSV: 291.7 CM/SEC
BH CV XLRA MEAS LEFT VERTEBRAL A EDV: 16.8 CM/SEC
BH CV XLRA MEAS LEFT VERTEBRAL A PSV: 72.6 CM/SEC
BH CV XLRA MEAS RIGHT CCA RATIO VEL: 98.5 CM/SEC
BH CV XLRA MEAS RIGHT DIST CCA EDV: 22.3 CM/SEC
BH CV XLRA MEAS RIGHT DIST CCA PSV: 99.2 CM/SEC
BH CV XLRA MEAS RIGHT DIST ICA EDV: 40.2 CM/SEC
BH CV XLRA MEAS RIGHT DIST ICA PSV: 111.3 CM/SEC
BH CV XLRA MEAS RIGHT ICA RATIO VEL: 123 CM/SEC
BH CV XLRA MEAS RIGHT ICA/CCA RATIO: 1.2
BH CV XLRA MEAS RIGHT MID CCA EDV: 21.6 CM/SEC
BH CV XLRA MEAS RIGHT MID CCA PSV: 111.7 CM/SEC
BH CV XLRA MEAS RIGHT MID ICA EDV: 36.3 CM/SEC
BH CV XLRA MEAS RIGHT MID ICA PSV: 123.8 CM/SEC
BH CV XLRA MEAS RIGHT PROX CCA EDV: 18.9 CM/SEC
BH CV XLRA MEAS RIGHT PROX CCA PSV: 118.7 CM/SEC
BH CV XLRA MEAS RIGHT PROX ECA EDV: 8.8 CM/SEC
BH CV XLRA MEAS RIGHT PROX ECA PSV: 117.5 CM/SEC
BH CV XLRA MEAS RIGHT PROX ICA EDV: 21.8 CM/SEC
BH CV XLRA MEAS RIGHT PROX ICA PSV: 71.6 CM/SEC
BH CV XLRA MEAS RIGHT PROX SCLA PSV: 184.6 CM/SEC
BH CV XLRA MEAS RIGHT VERTEBRAL A EDV: 23.3 CM/SEC
BH CV XLRA MEAS RIGHT VERTEBRAL A PSV: 92.4 CM/SEC
CHOLEST SERPL-MCNC: 158 MG/DL (ref 0–200)
HBA1C MFR BLD: 5.2 % (ref 4.8–5.6)
HDLC SERPL-MCNC: 30 MG/DL (ref 40–60)
IVRT: 88 MSEC
LDLC SERPL CALC-MCNC: 98 MG/DL (ref 0–100)
LDLC/HDLC SERPL: 3.13 {RATIO}
LV EF 2D ECHO EST: 60 %
MAXIMAL PREDICTED HEART RATE: 144 BPM
MAXIMAL PREDICTED HEART RATE: 144 BPM
RIGHT ARM BP: NORMAL MMHG
STRESS TARGET HR: 122 BPM
STRESS TARGET HR: 122 BPM
TRIGL SERPL-MCNC: 171 MG/DL (ref 0–150)
VLDLC SERPL-MCNC: 30 MG/DL (ref 5–40)

## 2021-11-09 PROCEDURE — 99221 1ST HOSP IP/OBS SF/LOW 40: CPT | Performed by: INTERNAL MEDICINE

## 2021-11-09 PROCEDURE — 93306 TTE W/DOPPLER COMPLETE: CPT | Performed by: INTERNAL MEDICINE

## 2021-11-09 PROCEDURE — 99233 SBSQ HOSP IP/OBS HIGH 50: CPT | Performed by: INTERNAL MEDICINE

## 2021-11-09 PROCEDURE — 93880 EXTRACRANIAL BILAT STUDY: CPT

## 2021-11-09 PROCEDURE — 97530 THERAPEUTIC ACTIVITIES: CPT

## 2021-11-09 PROCEDURE — 80061 LIPID PANEL: CPT | Performed by: NURSE PRACTITIONER

## 2021-11-09 PROCEDURE — 83036 HEMOGLOBIN GLYCOSYLATED A1C: CPT | Performed by: NURSE PRACTITIONER

## 2021-11-09 PROCEDURE — 97161 PT EVAL LOW COMPLEX 20 MIN: CPT

## 2021-11-09 PROCEDURE — 93306 TTE W/DOPPLER COMPLETE: CPT

## 2021-11-09 PROCEDURE — 99232 SBSQ HOSP IP/OBS MODERATE 35: CPT | Performed by: PSYCHIATRY & NEUROLOGY

## 2021-11-09 PROCEDURE — 93880 EXTRACRANIAL BILAT STUDY: CPT | Performed by: INTERNAL MEDICINE

## 2021-11-09 PROCEDURE — 70551 MRI BRAIN STEM W/O DYE: CPT

## 2021-11-09 PROCEDURE — 93893 TCD STD ICR ART VEN-ART SHNT: CPT

## 2021-11-09 PROCEDURE — 92523 SPEECH SOUND LANG COMPREHEN: CPT

## 2021-11-09 PROCEDURE — 70450 CT HEAD/BRAIN W/O DYE: CPT

## 2021-11-09 PROCEDURE — 97165 OT EVAL LOW COMPLEX 30 MIN: CPT

## 2021-11-09 RX ORDER — FAMOTIDINE 20 MG/1
20 TABLET, FILM COATED ORAL 2 TIMES DAILY
Status: DISCONTINUED | OUTPATIENT
Start: 2021-11-09 | End: 2021-11-12 | Stop reason: HOSPADM

## 2021-11-09 RX ORDER — HEPARIN SODIUM 5000 [USP'U]/ML
5000 INJECTION, SOLUTION INTRAVENOUS; SUBCUTANEOUS EVERY 8 HOURS SCHEDULED
Status: DISCONTINUED | OUTPATIENT
Start: 2021-11-09 | End: 2021-11-09 | Stop reason: SDUPTHER

## 2021-11-09 RX ADMIN — ASPIRIN 81 MG: 81 TABLET, COATED ORAL at 15:38

## 2021-11-09 RX ADMIN — ATORVASTATIN CALCIUM 80 MG: 40 TABLET, FILM COATED ORAL at 20:18

## 2021-11-09 RX ADMIN — FAMOTIDINE 20 MG: 20 TABLET, FILM COATED ORAL at 20:18

## 2021-11-09 RX ADMIN — FAMOTIDINE 20 MG: 20 TABLET, FILM COATED ORAL at 11:59

## 2021-11-09 RX ADMIN — SODIUM CHLORIDE, PRESERVATIVE FREE 10 ML: 5 INJECTION INTRAVENOUS at 20:18

## 2021-11-09 RX ADMIN — APIXABAN 5 MG: 5 TABLET, FILM COATED ORAL at 15:38

## 2021-11-09 NOTE — CONSULTS
[facility]   [address], [address]  [place], [place], [address]    [phone]  [email or web address]           INPATIENT CONSULTATION NOTE    Patient Care Team:  Patient Care Team:  Lauren Shah MD as PCP - General (Internal Medicine)    Requesting Provider and Reason for consultation: The patient is being seen today at the request of Dr. Santos for embolic CVA.     Chief complaint:   Chief Complaint   Patient presents with   • Speech Problem          HPI:    Torsten Jackson is a 76 y.o.  white male semiretired [employer]     Cardiac focused problem list:  1. Acute ischemic stroke  a. Manifested as expressive aphasia   b. Status post TPA   2. Glaucoma  3. BPH  4. Labile hypertension-probably essential    Patient presented to [facility] ED yesterday after an abrupt onset of difficulty with speech.  Reports he had a normal morning and was getting ready to leave his house to go workout at the gym when he began having difficulty with his speech.  No other symptoms associated.  CT in the ED revealed small area of left posterior MCA distribution frontoparietal region of reversible ischemia. MRI of the brain revealed multifocal small areas of acute infarct involving the right frontal lobe and left posterior temporal lobe, favoring central embolic source. Patient received partial TPA dose due to recurrent aphasia during infusion.  No neurological deficit overnight. Patient's symptoms have largely resolved, only mild expressive aphasia remains.     No arrhythmias noted since admission.  Patient has no history of atrial fibrillation. He is an avid runner and exercises 5 to 6 days a week.  He does note that he has felt more fatigued the last 2-3 months and his running has been a bit more difficult.   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 Family history significant for CAD in his father and TIA in his mother.  He is a lifelong non-smoker.      Review  of Systems:  Positive for expressive aphasia, fatigue  All other systems reviewed and are negative.    PFSH:  Patient Active Problem List   Diagnosis   • Acute CVA manifesting only as expressive aphasia (HCC)   • History of glaucoma crisis 2016 resolved post bilateral cataract extraction   • BPH (benign prostatic hyperplasia).  Not on therapy       No current facility-administered medications on file prior to encounter.     Current Outpatient Medications on File Prior to Encounter   Medication Sig Dispense Refill   • multivitamin (MULTIPLE VITAMIN PO) Take 1 tablet by mouth Daily.       No Known Allergies    Social History     Socioeconomic History   • Marital status:    Tobacco Use   • Smoking status: Never Smoker   • Smokeless tobacco: Never Used     No family history on file.         Objective:     Vital Sign Min/Max for last 24 hours  Temp  Min: 96.4 °F (35.8 °C)  Max: 98.2 °F (36.8 °C)   BP  Min: 124/73  Max: 178/89   Pulse  Min: 45  Max: 80   Resp  Min: 14  Max: 18   SpO2  Min: 79 %  Max: 100 %   No data recorded      Intake/Output Summary (Last 24 hours) at 11/9/2021 1345  Last data filed at 11/9/2021 1000  Gross per 24 hour   Intake 1441 ml   Output 2150 ml   Net -709 ml           Vitals:    11/09/21 1300   BP: 156/71   Pulse: 51   Resp:    Temp:    SpO2: 97%       CONSTITUTIONAL: Well-nourished. In no acute distress.   SKIN: Warm and dry. No rashes noted  LUNGS: Normal effort. Clear to auscultation bilaterally without wheezing, rhonchi, or rales noted.   CARDIOVASCULAR: Regular rate and rhythm with a normal S1 and S2. There is grade 1/6 systolic; no murmur, gallop, rub, or click appreciated.   PERIPHERAL VASCULAR: Carotid upstroke is 2+ bilaterally and without bruits. Radial pulses are 2+ bilaterally. There is no lower extremity edema.   PSYCHIATRIC: Alert, orientated x 3, appropriate affect and mood    Labs, results reviewed by myself:  Lab Results   Component Value Date    TROPONINT <0.010  11/08/2021       Glucose   Date Value Ref Range Status   11/08/2021 94 65 - 99 mg/dL Final     BUN   Date Value Ref Range Status   11/08/2021 17 8 - 23 mg/dL Final     Creatinine   Date Value Ref Range Status   11/08/2021 1.05 0.76 - 1.27 mg/dL Final     Sodium   Date Value Ref Range Status   11/08/2021 137 136 - 145 mmol/L Final     Potassium   Date Value Ref Range Status   11/08/2021 4.6 3.5 - 5.2 mmol/L Final     Comment:     Slight hemolysis detected by analyzer. Results may be affected.     Chloride   Date Value Ref Range Status   11/08/2021 100 98 - 107 mmol/L Final     CO2   Date Value Ref Range Status   11/08/2021 22.0 22.0 - 29.0 mmol/L Final     Calcium   Date Value Ref Range Status   11/08/2021 9.0 8.6 - 10.5 mg/dL Final     Total Protein   Date Value Ref Range Status   11/08/2021 6.8 6.0 - 8.5 g/dL Final     Albumin   Date Value Ref Range Status   11/08/2021 4.30 3.50 - 5.20 g/dL Final     ALT (SGPT)   Date Value Ref Range Status   11/08/2021 19 1 - 41 U/L Final   11/08/2021 19 1 - 41 U/L Final     AST (SGOT)   Date Value Ref Range Status   11/08/2021 23 1 - 40 U/L Final   11/08/2021 26 1 - 40 U/L Final     Alkaline Phosphatase   Date Value Ref Range Status   11/08/2021 79 39 - 117 U/L Final     Total Bilirubin   Date Value Ref Range Status   11/08/2021 0.7 0.0 - 1.2 mg/dL Final     eGFR Non  Amer   Date Value Ref Range Status   11/08/2021 69 >60 mL/min/1.73 Final     BUN/Creatinine Ratio   Date Value Ref Range Status   11/08/2021 16.2 7.0 - 25.0 Final     Anion Gap   Date Value Ref Range Status   11/08/2021 15.0 5.0 - 15.0 mmol/L Final       Lab Results   Component Value Date    CHOL 158 11/09/2021     Lab Results   Component Value Date    TRIG 171 (H) 11/09/2021     Lab Results   Component Value Date    HDL 30 (L) 11/09/2021     Lab Results   Component Value Date    LDL 98 11/09/2021     No components found for: LDLDIRECTC      WBC   Date Value Ref Range Status   11/08/2021 7.48 3.40 - 10.80  10*3/mm3 Final     RBC   Date Value Ref Range Status   11/08/2021 4.93 4.14 - 5.80 10*6/mm3 Final     Hemoglobin   Date Value Ref Range Status   11/08/2021 14.7 13.0 - 17.7 g/dL Final     Hematocrit   Date Value Ref Range Status   11/08/2021 44.5 37.5 - 51.0 % Final     MCV   Date Value Ref Range Status   11/08/2021 90.3 79.0 - 97.0 fL Final     MCH   Date Value Ref Range Status   11/08/2021 29.8 26.6 - 33.0 pg Final     MCHC   Date Value Ref Range Status   11/08/2021 33.0 31.5 - 35.7 g/dL Final     RDW   Date Value Ref Range Status   11/08/2021 12.3 12.3 - 15.4 % Final     RDW-SD   Date Value Ref Range Status   11/08/2021 40.4 37.0 - 54.0 fl Final     MPV   Date Value Ref Range Status   11/08/2021 11.2 6.0 - 12.0 fL Final     Platelets   Date Value Ref Range Status   11/08/2021 201 140 - 450 10*3/mm3 Final     Neutrophil %   Date Value Ref Range Status   11/08/2021 53.1 42.7 - 76.0 % Final     Lymphocyte %   Date Value Ref Range Status   11/08/2021 30.3 19.6 - 45.3 % Final     Monocyte %   Date Value Ref Range Status   11/08/2021 10.7 5.0 - 12.0 % Final     Eosinophil %   Date Value Ref Range Status   11/08/2021 4.4 0.3 - 6.2 % Final     Basophil %   Date Value Ref Range Status   11/08/2021 1.2 0.0 - 1.5 % Final     Immature Grans %   Date Value Ref Range Status   11/08/2021 0.3 0.0 - 0.5 % Final     Neutrophils, Absolute   Date Value Ref Range Status   11/08/2021 3.97 1.70 - 7.00 10*3/mm3 Final     Lymphocytes, Absolute   Date Value Ref Range Status   11/08/2021 2.27 0.70 - 3.10 10*3/mm3 Final     Monocytes, Absolute   Date Value Ref Range Status   11/08/2021 0.80 0.10 - 0.90 10*3/mm3 Final     Eosinophils, Absolute   Date Value Ref Range Status   11/08/2021 0.33 0.00 - 0.40 10*3/mm3 Final     Basophils, Absolute   Date Value Ref Range Status   11/08/2021 0.09 0.00 - 0.20 10*3/mm3 Final     Immature Grans, Absolute   Date Value Ref Range Status   11/08/2021 0.02 0.00 - 0.05 10*3/mm3 Final     nRBC   Date Value Ref  Range Status   11/08/2021 0.0 0.0 - 0.2 /100 WBC Final         Diagnostic Data:    EKG:  Sinus bradycardia    Results for orders placed during the hospital encounter of 11/08/21    Adult Transthoracic Echo Complete W/ Cont if Necessary Per Protocol    Interpretation Summary  · Estimated right ventricular systolic pressure from tricuspid regurgitation is normal (<35 mmHg). Calculated right ventricular systolic pressure from tricuspid regurgitation is 25 mmHg.  · Saline test results are negative for right to left atrial level shunt.  · Left ventricular wall thickness is consistent with mild concentric hypertrophy.  · Estimated left ventricular EF = 60% Estimated left ventricular EF was in agreement with the calculated left ventricular EF. Left ventricular ejection fraction appears to be 56 - 60%. Left ventricular systolic function is normal.  · Left ventricular diastolic function is consistent with (grade II w/high LAP) pseudonormalization.  · There is calcification of the aortic valve mainly affecting the non-coronary and left coronary cusp(s).  · Normal right ventricular cavity size, wall thickness, systolic function and septal motion noted.  · The aortic valve exhibits moderate sclerosis.  · No evidence of pulmonary hypertension is present.  · There is no evidence of pericardial effusion.  · No evidence of a patent foramen ovale.      Tele:  Sinus bradycardia         Assessment and Plan:     ASSESSMENT:  1. Acute CVA  a. Manifested as only expressive aphasia  b. MRI of brain shows acute embolic events the largest being noted in the posterior parietal on the left.  c. Status post TPA 11/08/2021  2. Glaucoma  3. BPH    PLAN:  1. Concur with anticoagulation with Eliquis for now.   2. Will schedule patient for MARISELA tomorrow; procedure, risks, potential complications discussed with patient and wife in room..  3. Concur with empiric statin therapy.  4. Recommend treating hypertension with amlodipine/valsartan 5/160 mg  daily if all concur.  5. Event monitor at discharge; if negative would consider implantable loop recorder.     Scribed for Dr. Ratliff by Paulette Cheung, APRN. 11/9/2021  13:48 EST     I have seen and examined the patient, case was discussed with the physician extender, reviewed the above note, necessary changes were made and I agree with the final note.     Rony Ratliff MD [facility]

## 2021-11-09 NOTE — CONSULTS
Diabetes Education    Patient Name:  Torsten Jackson  YOB: 1945  MRN: [id number]  Admit Date:  11/8/2021        Order received for Diabetes Education per [facility] Stroke Protocol. Chart reviewed. Pt has no history of diabetes on his chart and his A1c was 5.2%. He does not qualify for the follow up stroke class based on these exclusion criteria. Thank you for the referral. Please re-consult if further needs arise.      Electronically signed by:  Lynda Robert, MSN, APRN  11/09/21 08:54 EST

## 2021-11-09 NOTE — PLAN OF CARE
Goal Outcome Evaluation:  Plan of Care Reviewed With: patient           Outcome Summary: PT eval completed. Pt with no deficits requiring skilled IP PT. Pt amb 400 ft indep and navigated 10 steps with SBA. PT will sign off at this time. D/c rec for HWA.

## 2021-11-09 NOTE — PLAN OF CARE
Goal Outcome Evaluation:  Plan of Care Reviewed With: patient, spouse            SLP evaluation completed. Will address communication/mild apraxia. Please see note for further details and recommendations.

## 2021-11-09 NOTE — PROGRESS NOTES
Intensivist Note     11/9/2021  Hospital Day: 1  * No surgery found *  ICU Stays Timeline            Hospital Admission: 11/08/21 1242 - Current  ICU stays: 1      In Date/Time Event Department ICU Stay Duration     11/08/21 1242 Admission  [facility]      11/08/21 1603 Transfer In [facility] 2B ICU 19 hours 7 minutes             Mr. Torsten Jackson, 76 y.o. male is followed for:    Acute CVA manifesting only as expressive aphasia (HCC)    History of glaucoma crisis 2016 resolved post bilateral cataract extraction    BPH (benign prostatic hyperplasia).  Not on therapy       SUBJECTIVE     76-year-old  white male lifelong non-smoker with no prior medical problems except for BPH and glaucoma.  Patient indicates that he was well at 11:30 AM on 11/8/2021 and was getting ready to go to the gym.  Subsequently developed transient dysarthria and expressive aphasia/word salad (wife indicates that she had difficulty understanding him).  Also apparently noted some mild tingling or numbness of the left upper extremity.  Came directly to [facility] ER and by the time of his arrival his symptoms had resolved and NIH was calculated of 0.  Blood pressure was noted however to be 196/86 (he does not have underlying hypertension).  At the time he arrived he had no unilateral weakness, sensory loss, diplopia or other visual abnormalities, loss of balance, and denied headache, nausea, vomiting.     Neurology evaluation in the ER found no focal abnormalities or dysarthria at the time of their exam.  CT the head revealed no acute intracranial findings or evidence of ICH.  CTA revealed irregularities throughout bilateral MCA territories, right M2 and M3 segments, and moderate to severe stenosis and irregularity with near-total occlusion of left M3.  CT perfusion was subsequently obtained revealing a small area of abnormal perfusion/reversible ischemia in the frontoparietal region in the left posterior MCA distribution.  He was felt  "to be an appropriate candidate for TPA and infusion begun at 1340. I saw the patient approximately 30 to 35 minutes into his TPA infusion and he had developed recurrent onset of his expressive aphasia.  His wife indicated that it was not as severe as it had been at home, but was worse than upon arrival in the ER and now had recurred despite TPA.  Was immediately sent for a standard CT head which was unchanged without any evidence of hemorrhage.  He was on Cardene at the time this occurred in his SBP had come down to 140 (from his original pressure of 196/86).  It was thought that this may be related to lowering his pressure so the Cardene was discontinued.  Patient only received partial TPA (received approximately one half dose as infusion was stopped after 30 minutes).    Interval history: Overnight the patient has had no problems neurologically.  He has had complete resolution of his expressive aphasia since last evening.  MRI early this a.m. revealed multifocal small areas of acute infarct involving the right frontal lobe and left posterior temporal lobe suggesting a central embolic source.  Echocardiogram is pending at this time.  No arrhythmias have been noted during his hospital stay.  Follow-up CT scan 24 hours post TPA infusion is scheduled for 2:30 this afternoon.  Patient denies headache, blurred vision, diplopia, unilateral weakness, decreased sensation, instability or vertigo.  No nausea, vomiting, diarrhea, or difficulty urinating.       ROS: Per subjective, all other systems reviewed and were negative.    The patient's relevant PMH, PSH, FH, and SH were reviewed and updated in Epic as appropriate. Allergies and Medications reviewed.    OBJECTIVE     /73 (BP Location: Left arm, Patient Position: Lying)   Pulse 53   Temp 96.4 °F (35.8 °C) (Axillary)   Resp 18   Ht 188 cm (74.02\")   Wt 88.9 kg (196 lb)   SpO2 97%   BMI 25.15 kg/m²           Flowsheet Rows      First Filed Value   Admission " "Height 189.2 cm (74.5\") Documented at 11/08/2021 1221   Admission Weight 89.8 kg (198 lb) Documented at 11/08/2021 1221        Intake & Output (last day)       11/08 0701  11/09 0700 11/09 0701  11/10 0700    P.O. 540     I.V. (mL/kg) 901 (10.1)     Total Intake(mL/kg) 1441 (16.2)     Urine (mL/kg/hr) 1500 650 (1.8)    Total Output 1500 650    Net -59 -650                Exam:  General Exam:  Well-developed male in good spirits. No acute distress.  HEENT: Pupils equal and reactive. Nose and throat clear.  Neck:                          Supple, no JVD, thyromegaly, or adenopathy  Lungs: Clear anteriorly, laterally, posteriorly without wheezes, rales, rhonchi  Cardiovascular: RRR without murmurs or gallops.  HR 56 bpm  Abdomen: Soft nontender without organomegaly or masses.   and rectal: Deferred.  Extremities: No cyanosis clubbing edema.  Neurologic:                 Symmetric strength. No focal deficits.  No visual field cuts.  No dysarthria or aphasia    Chest X-Ray: No film today but yesterday's chest x-ray was normal    INFUSIONS  niCARdipine, 5-15 mg/hr, Last Rate: Stopped (11/08/21 1446)        Results from last 7 days   Lab Units 11/08/21  1232   WBC 10*3/mm3 7.48   HEMOGLOBIN g/dL 14.7   HEMATOCRIT % 44.5   PLATELETS 10*3/mm3 201     Results from last 7 days   Lab Units 11/08/21  1232   SODIUM mmol/L 137   POTASSIUM mmol/L 4.6   CHLORIDE mmol/L 100   CO2 mmol/L 22.0   BUN mg/dL 17   CREATININE mg/dL 1.05   GLUCOSE mg/dL 94   CALCIUM mg/dL 9.0         Results from last 7 days   Lab Units 11/08/21  1232   ALK PHOS U/L 79   BILIRUBIN mg/dL 0.7   ALT (SGPT) U/L 19  19   AST (SGOT) U/L 26  23       No results found for: SEDRATE  No results found for: BNP  Lab Results   Component Value Date    TROPONINT <0.010 11/08/2021     No results found for: TSH  No results found for: LACTATE  No results found for: CORTISOL        I reviewed the patient's results, images and medication.    Assessment/Plan   ASSESSMENT       "  Acute CVA manifesting only as expressive aphasia (HCC)    History of glaucoma crisis 2016 resolved post bilateral cataract extraction    BPH (benign prostatic hyperplasia).  Not on therapy      DISCUSSION: Based on the MRI showing multifocal bilateral areas of small infarct it appears this was an embolic event.  Even if echocardiogram is unrevealing and no arrhythmias are noted during this hospital stay, would think would need to fully anticoagulate.  Defer however to neurology. Regardless of therapy would still need an event recorder or Zio patch etc. upon discharge to rule out occult A. fib.  as an etiology of these emboli.  Neurology also feels PFO or atheroemboli from the a sending aorta are in the differential.    PLAN     Follow-up CT head at 1430  Follow-up echocardiogram  Continue aspirin and Plavix for now as well as high-dose statin  Discuss possible anticoagulation with neurology  Place event recorder or Zio patch upon discharge      Plan of care and goals reviewed with multidisciplinary team at daily rounds.    I discussed the patient's findings and my recommendations with patient, family and nursing staff    Time spent Critical care 25 min (It does not include procedure time).    Electronically signed by Lionel Santos MD, 11/09/21, 11:10 AM EST.   Pulmonary / Critical care medicine     Addendum: Appreciate everyone's help.  Cardiology to proceed with MARISELA tomorrow and wishes to place an implantable loop recorder at discharge.  They also recommend treatment of hypertension with amlodipine/valsartan 5/160 mg daily.    Electronically signed by Lionel Santos MD, 11/09/21, 6:37 PM EST.   Pulmonary / Critical care medicine

## 2021-11-09 NOTE — PROGRESS NOTES
"Clinical Nutrition Note      Patient Name: Torsten Jackson  MRN: [id number]  Admission date: 11/8/2021      Multidisciplinary Rounds    Additional information obtained during MDR:  RN reports pt adm w/ CVA s/p half dose of tPA, pt developed \" word salad\" during tPA administration , he went for stat CT w/ no bleed found. Today's NIHSS is a ) , 2 infarcts seen on MRI. MD notes this suggest cardiac source, ECHO in progress.      Current diet: Diet Regular; Cardiac    Oral Nutrition Supplement:     Pertinent medical data reviewed:  No nutrition risk identified on nursing screen; MST score \"0\"    Average oral intake per documentation:              Intervention:  Plan of care and goals of care reviewed    Monitor:  RD to follow per protocol      Roseline Alvarez MS,RD,LD  11/09/21 11:36 EST  Time: 15  mins       "

## 2021-11-09 NOTE — PLAN OF CARE
Goal Outcome Evaluation:  Plan of Care Reviewed With: patient        Progress: improving  Outcome Summary: OT eval complete. Pt is independent w/ t/fs and ADLs. Pt has no further deficits which require cont skilled IPOT intervention, will sign off. Recommend pt DC home w/ assist.

## 2021-11-09 NOTE — PLAN OF CARE
Goal Outcome Evaluation:      Pt transfer from ED arrived to 2B @1600. TPA started @ [facility] 0. Dysphagia passed, diet ordered. FSBS x1 WNL. Void with urinal x3. SBP <180 maintained without use of PRNs. Questions and concerns of pt and wife addressed.

## 2021-11-09 NOTE — PLAN OF CARE
Goal Outcome Evaluation:  Plan of Care Reviewed With: patient        Progress: improving  Outcome Summary: NIH a 0 this shift. MRI completed overnight. Patient states that he feels back to baseline except for slight changes to speech that are not objectively noted on exam. VSS overnight without use of IV meds. He has had some intermittent bradycardia but is active at baseline with HR in the 50's-60's. No complaints of headache or other adverse symptoms tonight. MRI report and AM labs pending. Will have echo and carotid duplex later today.

## 2021-11-09 NOTE — PLAN OF CARE
Goal Outcome Evaluation:  Plan of Care Reviewed With: patient, spouse        Progress: improving    Outcome Summary: Neurologically intact. NIH 0. Room air. VSS. Keep SBP<180 per Dr. Cherry. Carotid duplex, ECHO, TCD complete. 24h head CT complete. ASA and eliquis started. Plan for MARISELA tomorrow at 1100. NPO at midnight. Afebrile. UOP adequate. Ambulated in [place], up to chair for most of the day. Pt and spouse updated on plan of care

## 2021-11-09 NOTE — THERAPY EVALUATION
Acute Care - Speech Language Pathology Initial Evaluation  [facility]   Cognitive-Communication Evaluation     Patient Name: Torsten Jackson  : 1945  MRN: [id number]  Today's Date: 2021               Admit Date: 2021     Visit Dx:    ICD-10-CM ICD-9-CM   1. Acute CVA (cerebrovascular accident) (HCC)  I63.9 434.91   2. Speech disturbance, unspecified type  R47.9 784.59   3. Elevated blood pressure reading  R03.0 796.2     Patient Active Problem List   Diagnosis   • Acute CVA manifesting only as expressive aphasia (HCC)   • History of glaucoma crisis 2016 resolved post bilateral cataract extraction   • BPH (benign prostatic hyperplasia).  Not on therapy     Past Medical History:   Diagnosis Date   • Glaucoma      No past surgical history on file.    SLP Recommendation and Plan  SLP Diagnosis: Mild apraxia c/b sound substitutions, omissions, additions, and inconsistent errors. (21)        [facility] Criteria for Skilled Therapy Interventions Met: yes (21)  Anticipated Discharge Disposition (SLP): home with OP services, anticipate therapy at next level of care (21)        Predicted Duration Therapy Intervention (Days): until discharge (21)             Plan of Care Reviewed With: patient, spouse (21 1153)      SLP EVALUATION (last 72 hours)     SLP [facility] Evaluation     Row Name 21                   Communication Assessment/Intervention    Document Type evaluation  -MP        Subjective Information no complaints  -MP        Patient Observations alert; cooperative  -MP        Patient/Family/Caregiver Comments/Observations S/o present  -MP        Care Plan Review evaluation/treatment results reviewed; patient/other agree to care plan  -MP        Care Plan Review, Other Participant(s) significant other  -MP        Patient Effort good  -MP                  General Information    Patient Profile Reviewed yes  -MP        Pertinent History Of Current Problem  Adm 11/8 w/ CVA s/p tPA. CT cerebral perfusion w/ abnormal perfusion w/ small area of L posterior MCA distribution frontoparietal region. Passed RN dysphagia screen.  -MP        Precautions/Limitations, Vision WFL; for purposes of eval  -MP        Precautions/Limitations, Hearing WFL; for purposes of eval  -MP        Prior Level of Function-Communication WFL  -MP        Plans/Goals Discussed with patient; spouse/S.O.; agreed upon  -MP        Barriers to Rehab none identified  -MP        Patient's Goals for Discharge patient did not state  -MP        Family Goals for Discharge family did not state  -MP                  Pain    Additional Documentation Pain Scale: FACES Pre/Post-Treatment (Group)  -MP                  Pain Scale: FACES Pre/Post-Treatment    Pain: FACES Scale, Pretreatment 0-->no hurt  -MP        Posttreatment Pain Rating 0-->no hurt  -MP                  Comprehension Assessment/Intervention    Comprehension Assessment/Intervention Auditory Comprehension  -MP                  Auditory Comprehension Assessment/Intervention    Auditory Comprehension (Communication) WFL  -MP        Able to Identify Objects/Pictures (Communication) familiar objects; WFL  -MP        Answers Questions (Communication) yes/no; wh questions; personal; simple; complex; WFL  -MP        Able to Follow Commands (Communication) 2-step; 3-step; WFL  -MP        Narrative Discourse conversational level; WFL  -MP                  Expression Assessment/Intervention    Expression Assessment/Intervention verbal expression  -MP                  Verbal Expression Assessment/Intervention    Verbal Expression WFL  -MP        Automatic Speech (Communication) response to greeting; WFL  -MP        Repetition sounds; words; phrases; WFL  -MP        Phrase Completion automatic/predictable; WFL  -MP        Responsive Naming simple; WFL  -MP        Confrontational Naming high frequency; WFL  -MP        Conversational Discourse/Fluency WFL  -MP                   Oral Musculature and Cranial Nerve Assessment    Oral Motor General Assessment WFL  -MP                  Motor Speech Assessment/Intervention    Motor Speech Function mild impairment  -MP        Characteristics Consistent with Apraxia substitutions; omissions; additions; inconsistent errors  -                  Cognitive Assessment Intervention- SLP    Cognitive Function (Cognition) WFL  -MP        Orientation Status (Cognition) awareness of basic personal information; person; place; time; situation; WFL  -MP        Memory (Cognitive) short-term; long-term; WFL  -MP        Attention (Cognitive) selective; sustained; WFL  -MP        Thought Organization (Cognitive) concrete convergent; abstract convergent; drawing conclusions; WFL  -MP        Reasoning (Cognitive) simple; deductive; WFL  -MP        Problem Solving (Cognitive) simple; WFL  -MP        Functional Math (Cognitive) simple; word problems; WFL  -MP        Pragmatics (Communication) affect; awareness/response to humor; initiation; eye contact; topic maintenance; turn taking; [facility]  -MP                  SLP Clinical Impressions    SLP Diagnosis Mild apraxia c/b sound substitutions, omissions, additions, and inconsistent errors.  -MP        Rehab Potential/Prognosis good  -MP        [facility] Criteria for Skilled Therapy Interventions Met yes  -MP        Functional Impact difficulty in expressing complex messages; functional impact in social situations  -MP                  Recommendations    Therapy Frequency (SLP SLC) 3 days per week  -MP        Predicted Duration Therapy Intervention (Days) until discharge  -MP        Anticipated Discharge Disposition (SLP) home with OP services; anticipate therapy at next level of care  -MP              User Key  (r) = Recorded By, (t) = Taken By, (c) = Cosigned By    Initials Name Effective Dates    Bert Sultana MS CCC-SLP 06/16/21 -                    EDUCATION  The patient has been educated in the  following areas:     Cognitive Impairment Communication Impairment.           SLP GOALS     Row Name 11/09/21 1135             Planning and Execution of Connected Speech Goal 1 (SLP)    Improve Planning and Execution of Connected Speech by Goal 1 (SLP) completing open-ended sentences; repeating phrases; repeating word strings of increasing length; repeating contrasting word pairs; 80%; with minimal cues (75-90%)  -MP      Time Frame (Planning and Execution of Connected Speech Goal 1, SLP) short term goal (STG)  -MP              Additional Goal 1 (SLP)    Additional Goal 1, SLP LTG: Pt will improve speech in order to return to baseline communication level w/ 100% acc and no cues  -MP      Time Frame (Additional Goal 1, SLP) by discharge  -MP            User Key  (r) = Recorded By, (t) = Taken By, (c) = Cosigned By    Initials Name Provider Type    Bert Sultana MS CCC-SLP Speech and Language Pathologist                        Time Calculation:      Time Calculation- SLP     Row Name 11/09/21 1153             Time Calculation- SLP    SLP Start Time 1140  -MP      SLP Received On 11/09/21  -MP              Untimed Charges    [id number] Eval Speech and Production w/ Language Minutes 40  -MP              Total Minutes    Untimed Charges Total Minutes 40  -MP       Total Minutes 40  -MP            User Key  (r) = Recorded By, (t) = Taken By, (c) = Cosigned By    Initials Name Provider Type    Bert Sultana MS CCC-SLP Speech and Language Pathologist                Therapy Charges for Today     Code Description Service Date Service Provider Modifiers Qty    [id number] HC ST EVAL SPEECH AND PROD W LANG  3 11/9/2021 Bert Mata MS CCC-DANIEL GN 1                     MS RICHI Walton  11/9/2021

## 2021-11-09 NOTE — THERAPY DISCHARGE NOTE
Patient Name: Torsten Jackson  : 1945    MRN: [id number]                              Today's Date: 2021       Admit Date: 2021    Visit Dx:     ICD-10-CM ICD-9-CM   1. Acute CVA (cerebrovascular accident) (HCC)  I63.9 434.91   2. Speech disturbance, unspecified type  R47.9 784.59   3. Elevated blood pressure reading  R03.0 796.2     Patient Active Problem List   Diagnosis   • Acute CVA manifesting only as expressive aphasia (HCC)   • History of glaucoma crisis 2016 resolved post bilateral cataract extraction   • BPH (benign prostatic hyperplasia).  Not on therapy     Past Medical History:   Diagnosis Date   • Glaucoma      No past surgical history on file.   General Information     Row Name 21 1136          Physical Therapy Time and Intention    Document Type discharge evaluation/summary  -AY     Mode of Treatment physical therapy  -AY     Row Name 21 1136          General Information    Patient Profile Reviewed yes  -AY     Prior Level of Function independent:; all household mobility; community mobility; gait; transfer; bed mobility; using stairs  -AY     Existing Precautions/Restrictions no known precautions/restrictions  -AY     Barriers to Rehab none identified  -AY     Row Name 21 1136          Living Environment    Lives With spouse  -AY     Row Name 21 1136          Home Main Entrance    Number of Stairs, Main Entrance four  -AY     Stair Railings, Main Entrance railing on left side (ascending)  -AY     Row Name 21 1136          Stairs Within Home, Primary    Stairs, Within Home, Primary flight to bedroom/bathroom  -AY     Number of Stairs, Within Home, Primary other (see comments)  -AY     Row Name 21 1136          Cognition    Orientation Status (Cognition) oriented x 4  -AY           User Key  (r) = Recorded By, (t) = Taken By, (c) = Cosigned By    Initials Name Provider Type    AY Lisa Fraser PT Physical Therapist               Mobility     Row Name  11/09/21 1136          Sit-Stand Transfer    Sit-Stand [place] (Transfers) independent  -AY     Row Name 11/09/21 1136          Gait/Stairs (Locomotion)    [place] Level (Gait) independent  -AY     Distance in Feet (Gait) 400  -AY     [place] Level (Stairs) supervision; verbal cues  -AY     Handrail Location (Stairs) right side (ascending)  -AY     Number of Steps (Stairs) 10  -AY     Ascending Technique (Stairs) step-over-step  -AY     Descending Technique (Stairs) step-over-step  -AY     Comment (Gait/Stairs) pt demonstrated good stability and no LOB with gait or stair navigation. Dynamic gait activities performed.  -AY           User Key  (r) = Recorded By, (t) = Taken By, (c) = Cosigned By    Initials Name Provider Type    AY Lisa Fraser PT Physical Therapist               Obj/Interventions     Row Name 11/09/21 1137          Range of Motion Comprehensive    General Range of Motion bilateral lower extremity ROM WFL  -AY     Row Name 11/09/21 1137          Strength Comprehensive (MMT)    General Manual Muscle Testing (MMT) Assessment no strength deficits identified  -AY     Row Name 11/09/21 1137          Motor Skills    Motor Skills coordination  -AY     Coordination WFL; bilateral; lower extremity; heel to rueda  -AY     Row Name 11/09/21 1137          Balance    Balance Assessment sitting static balance; sitting dynamic balance; standing static balance; standing dynamic balance  -AY     Static Sitting Balance WFL; sitting, edge of bed  -AY     Dynamic Sitting Balance WFL; sitting, edge of bed  -AY     Static Standing Balance WFL; standing; unsupported  -AY     Dynamic Standing Balance WFL; unsupported; standing  -AY     Row Name 11/09/21 1137          Sensory Assessment (Somatosensory)    Sensory Assessment (Somatosensory) LE sensation intact  -AY           User Key  (r) = Recorded By, (t) = Taken By, (c) = Cosigned By    Initials Name Provider Type    Lisa Trevino PT Physical Therapist                Goals/Plan    No documentation.                Clinical Impression     Row Name 11/09/21 1138          Pain    Additional Documentation Pain Scale: Numbers Pre/Post-Treatment (Group)  -AY     Row Name 11/09/21 1138          Pain Scale: Numbers Pre/Post-Treatment    Pretreatment Pain Rating 0/10 - no pain  -AY     Posttreatment Pain Rating 0/10 - no pain  -AY     Pain Intervention(s) Ambulation/increased activity; Repositioned  -AY     Row Name 11/09/21 1138          Plan of Care Review    Plan of Care Reviewed With patient  -AY     Outcome Summary PT eval completed. Pt with no deficits requiring skilled IP PT. Pt amb 400 ft indep and navigated 10 steps with SBA. PT will sign off at this time. D/c rec for HWA.  -AY     Row Name 11/09/21 1138          Therapy Assessment/Plan (PT)    Rehab Potential (PT) good, to achieve stated therapy goals  -AY     Criteria for Skilled Interventions Met (PT) yes; meets criteria; skilled treatment is necessary  -AY     Row Name 11/09/21 1138          Vital Signs    Pre Systolic BP Rehab 178  -AY     Pre Treatment Diastolic BP 89  -AY     Post Systolic BP Rehab 188  -AY     Post Treatment Diastolic BP 86  -AY     Pretreatment Heart Rate (beats/min) 56  -AY     Posttreatment Heart Rate (beats/min) 51  -AY     Pre SpO2 (%) 99  -AY     O2 Delivery Pre Treatment room air  -AY     O2 Delivery Intra Treatment room air  -AY     Post SpO2 (%) 98  -AY     O2 Delivery Post Treatment room air  -AY     Pre Patient Position Sitting  -AY     Intra Patient Position Standing  -AY     Post Patient Position Sitting  -AY     Row Name 11/09/21 1138          Positioning and Restraints    Pre-Treatment Position in bed  -AY     Post Treatment Position bed  -AY     In Bed notified nsg; supine; call light within reach; encouraged to call for assist; exit alarm on; side rails up x3; legs elevated  -AY           User Key  (r) = Recorded By, (t) = Taken By, (c) = Cosigned By    Initials Name  Provider Type    Lisa Trevino, MEI Physical Therapist               Outcome Measures     Row Name 11/09/21 1142 11/09/21 0800       How much help from another person do you currently need...    Turning from your back to your side while in flat bed without using bedrails? 4  -AY 4  -CB    Moving from lying on back to sitting on the side of a flat bed without bedrails? 4  -AY 4  -CB    Moving to and from a bed to a chair (including a wheelchair)? 4  -AY 4  -CB    Standing up from a chair using your arms (e.g., wheelchair, bedside chair)? 4  -AY 4  -CB    Climbing 3-5 steps with a railing? 4  -AY 4  -CB    To walk in hospital room? 4  -AY 4  -CB    AM-PAC 6 Clicks Score (PT) 24  -AY 24  -CB    Row Name 11/09/21 1142 11/09/21 0950       Modified [place] Scale    Pre-Stroke Modified [place] Scale 0 - No Symptoms at all.  -AY 0 - No Symptoms at all.  -CS    Modified [place] Scale 0 - No Symptoms at all.  -AY 0 - No Symptoms at all.  -CS    Row Name 11/09/21 1142 11/09/21 0950       Functional Assessment    Outcome Measure Options AM-PAC 6 Clicks Basic Mobility (PT); Modified [place]  -AY AM-PAC 6 Clicks Daily Activity (OT); Modified [place]  -CS          User Key  (r) = Recorded By, (t) = Taken By, (c) = Cosigned By    Initials Name Provider Type    Rosanne Tai OT Occupational Therapist    Meet Acharya, RN Registered Nurse    Lisa Trevino, PT Physical Therapist              Physical Therapy Education                 Title: PT OT SLP Therapies (In Progress)     Topic: Physical Therapy (In Progress)     Point: Mobility training (Done)     Learning Progress Summary           Patient Acceptance, E,TB, VU,NR by AY at 11/9/2021 1143                   Point: Home exercise program (Not Started)     Learner Progress:  Not documented in this visit.          Point: Body mechanics (Done)     Learning Progress Summary           Patient Acceptance, E,TB, VU,NR by AY at 11/9/2021 1143                   Point: Precautions  (Done)     Learning Progress Summary           Patient Acceptance, E,TB, VU,NR by AY at 11/9/2021 1143                               User Key     Initials Effective Dates Name Provider Type Discipline    AY 11/10/20 -  Lisa Fraser PT Physical Therapist PT              PT Recommendation and Plan     Plan of Care Reviewed With: patient  Outcome Summary: PT eval completed. Pt with no deficits requiring skilled IP PT. Pt amb 400 ft indep and navigated 10 steps with SBA. PT will sign off at this time. D/c rec for HWA.     Time Calculation:    PT Charges     Row Name 11/09/21 1144             Time Calculation    Start Time 0835  -AY      PT Received On 11/09/21  -AY      PT Goal Re-Cert Due Date 11/19/21  -AY              Untimed Charges    PT Eval/Re-eval Minutes 46  -AY              Total Minutes    Untimed Charges Total Minutes 46  -AY       Total Minutes 46  -AY            User Key  (r) = Recorded By, (t) = Taken By, (c) = Cosigned By    Initials Name Provider Type    AY Lisa Fraser PT Physical Therapist              Therapy Charges for Today     Code Description Service Date Service Provider Modifiers Qty    77779170162 HC PT EVAL LOW COMPLEXITY 4 11/9/2021 Lisa Fraser, PT GP 1          PT G-Codes  Outcome Measure Options: AM-PAC 6 Clicks Basic Mobility (PT), Modified [place]  AM-PAC 6 Clicks Score (PT): 24  AM-PAC 6 Clicks Score (OT): 24  Modified [place] Scale: 0 - No Symptoms at all.    PT Discharge Summary  Anticipated Discharge Disposition (PT): home with assist  Reason for Discharge: At baseline function, Independent  Discharge Destination: Home with assist    Lisa Fraser PT  11/9/2021

## 2021-11-09 NOTE — THERAPY DISCHARGE NOTE
Acute Care - Occupational Therapy Discharge  [facility]    Patient Name: Torsten Jackson  : 1945    MRN: [id number]                              Today's Date: 2021       Admit Date: 2021    Visit Dx:     ICD-10-CM ICD-9-CM   1. Acute CVA (cerebrovascular accident) (HCC)  I63.9 434.91   2. Speech disturbance, unspecified type  R47.9 784.59   3. Elevated blood pressure reading  R03.0 796.2     Patient Active Problem List   Diagnosis   • Acute CVA manifesting only as expressive aphasia (HCC)   • History of glaucoma crisis 2016 resolved post bilateral cataract extraction   • BPH (benign prostatic hyperplasia).  Not on therapy     Past Medical History:   Diagnosis Date   • Glaucoma      No past surgical history on file.   General Information     Row Name 21          OT Time and Intention    Document Type discharge evaluation/summary  -CS     Mode of Treatment occupational therapy  -CS     Row Name 21          General Information    Patient Profile Reviewed yes  -CS     Prior Level of Function independent:; all household mobility; ADL's  -CS     Existing Precautions/Restrictions no known precautions/restrictions  -CS     Barriers to Rehab none identified  -CS     Row Name 21          Living Environment    Lives With spouse  -CS     Row Name 21          Home Main Entrance    Number of Stairs, Main Entrance four  -CS     Row Name 21          Cognition    Orientation Status (Cognition) oriented x 4  -CS           User Key  (r) = Recorded By, (t) = Taken By, (c) = Cosigned By    Initials Name Provider Type    CS Rosanne Gonzáles OT Occupational Therapist               Mobility/ADL's     Row Name 21          Bed Mobility    Bed Mobility supine-sit  -CS     Supine-Sit [place] (Bed Mobility) modified independence  -CS     Assistive Device (Bed Mobility) head of bed elevated  -CS     Row Name 21          Transfers    Transfers  sit-stand transfer  -     Sit-Stand [place] (Transfers) independent  -     Row Name 11/09/21 0948          Activities of Daily Living    BADL Assessment/Intervention lower body dressing; toileting  -     Row Name 11/09/21 0948          Lower Body Dressing Assessment/Training    [place] Level (Lower Body Dressing) don; socks; independent  -     Position (Lower Body Dressing) edge of bed sitting  -     Row Name 11/09/21 0948          Toileting Assessment/Training    [place] Level (Toileting) independent; adjust/manage clothing; perform perineal hygiene  -     Assistive Devices (Toileting) urinal  -CS     Position (Toileting) sitting up in bed  -           User Key  (r) = Recorded By, (t) = Taken By, (c) = Cosigned By    Initials Name Provider Type    CS Rosanne Gonzáles OT Occupational Therapist               Obj/Interventions     Row Name 11/09/21 0949          Sensory Assessment (Somatosensory)    Sensory Assessment (Somatosensory) UE sensation intact  -     Row Name 11/09/21 0949          Vision Assessment/Intervention    Visual Impairment/Limitations WFL  -     Row Name 11/09/21 0949          Range of Motion Comprehensive    General Range of Motion bilateral upper extremity ROM WFL  -     Row Name 11/09/21 0949          Strength Comprehensive (MMT)    Comment, General Manual Muscle Testing (MMT) Assessment BUE grossly WFL  -     Row Name 11/09/21 0949          Motor Skills    Motor Skills coordination  -     Coordination WFL; fine motor deficit; gross motor deficit; bilateral; upper extremity  -     Row Name 11/09/21 0949          Balance    Balance Assessment sitting static balance; sitting dynamic balance; standing static balance; standing dynamic balance  -     Static Sitting Balance WFL; sitting, edge of bed  -CS     Dynamic Sitting Balance WFL; sitting, edge of bed  -CS     Static Standing Balance WFL; unsupported  -     Dynamic Standing Balance WFL; unsupported   -CS           User Key  (r) = Recorded By, (t) = Taken By, (c) = Cosigned By    Initials Name Provider Type    CS Rosanne Gonzáles, OT Occupational Therapist               Goals/Plan    No documentation.                Clinical Impression     Row Name 11/09/21 0949          Pain Assessment    Additional Documentation Pain Scale: FACES Pre/Post-Treatment (Group)  -CS     Row Name 11/09/21 0949          Pain Scale: FACES Pre/Post-Treatment    Pain: FACES Scale, Pretreatment 0-->no hurt  -CS     Posttreatment Pain Rating 0-->no hurt  -CS     Row Name 11/09/21 0949          Plan of Care Review    Plan of Care Reviewed With patient  -CS     Progress improving  -CS     Outcome Summary OT eval complete. Pt is independent w/ t/fs and ADLs. Pt has no further deficits which require cont skilled IPOT intervention, will sign off. Recommend pt DC home w/ assist.  -CS     [facility] Name 11/09/21 0949          Therapy Assessment/Plan (OT)    Patient/Family Therapy Goal Statement (OT) Return home.  -CS     Criteria for Skilled Therapeutic Interventions Met (OT) no problems identified which require skilled intervention  -CS     Therapy Frequency (OT) evaluation only  -CS     Row Name 11/09/21 0949          Therapy Plan Review/Discharge Plan (OT)    Anticipated Discharge Disposition (OT) home with assist  -CS     Row Name 11/09/21 0949          Vital Signs    Pre Systolic BP Rehab 178  -CS     Pre Treatment Diastolic BP 89  -CS     Post Systolic BP Rehab --  w/ PT  -CS     Pretreatment Heart Rate (beats/min) 51  -CS     Pre SpO2 (%) 99  -CS     O2 Delivery Pre Treatment room air  -CS     Pre Patient Position Supine  -CS     Intra Patient Position Standing  -CS     Post Patient Position Sitting  -CS     Row Name 11/09/21 0949          Positioning and Restraints    Pre-Treatment Position in bed  -CS     Post Treatment Position bed  -CS     In Bed notified nsg; sitting EOB; with PT  -CS           User Key  (r) = Recorded By, (t) = Taken By, (c)  = Cosigned By    Initials Name Provider Type    CS Rosanne Gonzáles OT Occupational Therapist               Outcome Measures     Row Name 11/09/21 0950          How much help from another is currently needed...    Putting on and taking off regular lower body clothing? 4  -CS     Bathing (including washing, rinsing, and drying) 4  -CS     Toileting (which includes using toilet bed pan or urinal) 4  -CS     Putting on and taking off regular upper body clothing 4  -CS     Taking care of personal grooming (such as brushing teeth) 4  -CS     Eating meals 4  -CS     AM-PAC 6 Clicks Score (OT) 24  -CS     Row Name 11/09/21 0800          How much help from another person do you currently need...    Turning from your back to your side while in flat bed without using bedrails? 4  -CB     Moving from lying on back to sitting on the side of a flat bed without bedrails? 4  -CB     Moving to and from a bed to a chair (including a wheelchair)? 4  -CB     Standing up from a chair using your arms (e.g., wheelchair, bedside chair)? 4  -CB     Climbing 3-5 steps with a railing? 4  -CB     To walk in hospital room? 4  -CB     AM-PAC 6 Clicks Score (PT) 24  -CB     Row Name 11/09/21 0950          Modified [place] Scale    Pre-Stroke Modified [place] Scale 0 - No Symptoms at all.  -CS     Modified [place] Scale 0 - No Symptoms at all.  -CS     Row Name 11/09/21 0950          Functional Assessment    Outcome Measure Options AM-PAC 6 Clicks Daily Activity (OT); Modified [place]  -CS           User Key  (r) = Recorded By, (t) = Taken By, (c) = Cosigned By    Initials Name Provider Type    Rosanne aTi OT Occupational Therapist    Meet Acharya RN Registered Nurse              Occupational Therapy Education                 Title: PT OT SLP Therapies (Done)     Topic: Occupational Therapy (Done)     Point: ADL training (Done)     Description:   Instruct learner(s) on proper safety adaptation and remediation techniques during self care  or transfers.   Instruct in proper use of assistive devices.              Learning Progress Summary           Patient Acceptance, E, VU by  at 11/9/2021 0952                   Point: Precautions (Done)     Description:   Instruct learner(s) on prescribed precautions during self-care and functional transfers.              Learning Progress Summary           Patient Acceptance, E, VU by  at 11/9/2021 0952                   Point: Body mechanics (Done)     Description:   Instruct learner(s) on proper positioning and spine alignment during self-care, functional mobility activities and/or exercises.              Learning Progress Summary           Patient Acceptance, E, VU by  at 11/9/2021 0952                               User Key     Initials Effective Dates Name Provider Type Discipline     09/02/21 -  Rosanne Gonzáles, OT Occupational Therapist OT              OT Recommendation and Plan  Retired Outcome Summary/Treatment Plan (OT)  Anticipated Discharge Disposition (OT): home with assist  Therapy Frequency (OT): evaluation only  Plan of Care Review  Plan of Care Reviewed With: patient  Progress: improving  Outcome Summary: OT eval complete. Pt is independent w/ t/fs and ADLs. Pt has no further deficits which require cont skilled IPOT intervention, will sign off. Recommend pt DC home w/ assist.  Plan of Care Reviewed With: patient  Outcome Summary: OT eval complete. Pt is independent w/ t/fs and ADLs. Pt has no further deficits which require cont skilled IPOT intervention, will sign off. Recommend pt DC home w/ assist.     Time Calculation:    Time Calculation- OT     Row Name 11/09/21 0954             Time Calculation- OT    OT Start Time 0820  -CS      OT Received On 11/09/21  -CS              Timed Charges    66391 - OT Therapeutic Activity Minutes 8  -CS              Untimed Charges    OT Eval/Re-eval Minutes 31  -CS              Total Minutes    Timed Charges Total Minutes 8  -CS      Untimed Charges Total  Minutes 31  -CS       Total Minutes 39  -CS            User Key  (r) = Recorded By, (t) = Taken By, (c) = Cosigned By    Initials Name Provider Type    CS Rosanne Gonzáles OT Occupational Therapist              Therapy Charges for Today     Code Description Service Date Service Provider Modifiers Qty    49885735734  OT THERAPEUTIC ACT EA 15 MIN 11/9/2021 Rosanne Gonzáles OT GO 1    [id number]  OT EVAL LOW COMPLEXITY 3 11/9/2021 Rosanne Gonzáles OT GO 1               Rosanne Gonzáles OT  11/9/2021

## 2021-11-09 NOTE — PROGRESS NOTES
"Neurology       Patient Care Team:  Lauren Shah MD as PCP - General (Internal Medicine)    Chief complaint: Embolic stroke    History: Patient is asymptomatic and has had no further problems.    CT post TPA is pending.      Past Medical History:   Diagnosis Date   • Glaucoma        Vital Signs   Vitals:    11/09/21 1100 11/09/21 1135 11/09/21 1200 11/09/21 1300   BP: 161/77  158/73 156/71   BP Location:   Left arm    Patient Position:   Lying    Pulse: 50  56 51   Resp:   16    Temp:   98 °F (36.7 °C)    TempSrc:   Oral    SpO2: 98%  98% 97%   Weight:  88.9 kg (196 lb)     Height:  188 cm (74\")         Physical Exam:   General: Awake and alert              Neuro: Oriented to person place and time.    Speech is normal.    Face is symmetrical.    All extremities move well.        Results Review:  Echocardiogram shows ejection fraction 60% with calcification of the aortic valve affecting the noncoronary and left coronary cusp.    He has some mild tricuspid regurgitation.  There is mild concentric left ventricular hypertrophy.    EKG shows sinus bradycardia and suggestion of left atrial enlargement.    MRI of the brain shows for acute embolic events the largest being noted in the posterior parietal on the left.  There is 1 in the right frontal and 1 in the area of the basal ganglia.      A1c is 5.2.    LDL cholesterol is 98.          Results from last 7 days   Lab Units 11/08/21  1232   WBC 10*3/mm3 7.48   HEMOGLOBIN g/dL 14.7   HEMATOCRIT % 44.5   PLATELETS 10*3/mm3 201     Results from last 7 days   Lab Units 11/08/21  1232   SODIUM mmol/L 137   POTASSIUM mmol/L 4.6   CHLORIDE mmol/L 100   CO2 mmol/L 22.0   BUN mg/dL 17   CREATININE mg/dL 1.05   CALCIUM mg/dL 9.0   BILIRUBIN mg/dL 0.7   ALK PHOS U/L 79   ALT (SGPT) U/L 19  19   AST (SGOT) U/L 26  23   GLUCOSE mg/dL 94       Imaging Results (Last 24 Hours)     Procedure Component Value Units Date/Time    MRI Brain Without Contrast [[id number]] Collected: " 11/09/21 0901     Updated: 11/09/21 0911    Narrative:      EXAMINATION: MRI BRAIN WO CONTRAST-      INDICATION: Aphasia; I63.9-Cerebral infarction, unspecified;  R47.9-Unspecified speech disturbances; R03.0-Elevated blood-pressure  reading, without diagnosis of hypertension     TECHNIQUE: Multiplanar multisequence MRI of the brain performed without  IV contrast     COMPARISON: CT head 1 day prior     FINDINGS: Scattered areas of diffusion restriction are present  compatible with areas of small acute infarct noted involving the right  middle frontal gyrus and left posterior temporal lobe. There is no  evidence of significant associated edema, mass effect or evidence of  hemorrhage. A small focus of susceptibility artifact is noted with faint  intravascular corresponding FLAIR signal abnormality along the posterior  aspect of the left sylvian fissure, likely representing small amount of  thrombus within the left distal MCA branch. Mild age-related changes are  evident with some generalized volume loss and typical T2 hyperintense  white matter sequela of chronic small vessel ischemia. There is  otherwise no evidence of intracranial hemorrhage, mass or mass effect.  The ventricles are normal in size and configuration. The orbits are  unremarkable. The paranasal sinuses demonstrate some scattered mucosal  thickening of the ethmoid air cells and maxillary sinuses.       Impression:      Multifocal small areas of acute infarct involving the right  frontal lobe and left posterior temporal lobe, favoring central embolic  source. There is no associated hemorrhage or mass effect. An area of  susceptibility artifact noted within the posterior aspect of the left  sylvian fissure likely represents some persisting left distal M2  thrombus.     Mild typical age-related changes are otherwise present as above. There  is no evidence of mass, mass effect or acute hemorrhage.     This report was finalized on 11/9/2021 9:08 AM by  Humphrey Lopes.       CT Head Without Contrast [[id number]] Collected: 11/08/21 1418     Updated: 11/08/21 2048    Narrative:      EXAMINATION: CT HEAD WO CONTRAST-11/08/2021:      INDICATION: TPA; I63.9-Cerebral infarction, unspecified;  R47.9-Unspecified speech disturbances; R03.0-Elevated blood-pressure  reading, without diagnosis of hypertension.      TECHNIQUE: CT head without intravenous contrast.     The radiation dose reduction device was turned on for each scan per the  ALARA (As Low as Reasonably Achievable) protocol.     COMPARISON: CT angiogram and perfusion performed earlier same day.     FINDINGS: The midline structures are symmetric without evidence of mass,  mass effect or midline shift. No intra-axial hemorrhage or extra-axial  fluid collection. Status post TPA administration. Globes and orbits are  unremarkable. Sinonasal mucosal disease again noted. Calvarium intact.       Impression:      No interval change or acute findings specifically, no  intracranial hemorrhage. Status post TPA administration. No midline  shift or hydrocephalus.     D:  11/08/2021  E:  11/08/2021           This report was finalized on 11/8/2021 8:45 PM by Dr. Terry Summers.       XR Chest 1 View [[id number]] Collected: 11/08/21 1415     Updated: 11/08/21 2048    Narrative:      EXAMINATION: XR CHEST 1 VW- 11/08/2021     INDICATION: Acute Stroke Protocol (onset < 12 hrs); I63.9-Cerebral  infarction, unspecified; R47.9-Unspecified speech disturbances;  R03.0-Elevated blood-pressure reading, without diagnosis of hypertension        COMPARISON: NONE     FINDINGS: Single view of the chest reveals cardiac size within normal  limits. Pulmonary vascularity within normal limits. No focal  opacification or consolidation. No pneumothorax or pleural effusion. No  acute osseous findings.       Impression:      No acute cardiopulmonary process.     D: 11/08/2021  E: 11/08/2021     This report was finalized on 11/8/2021 8:45 PM by   Terry Summers.       CT Angiogram Head w AI Analysis of LVO [[id number]] Collected: 11/08/21 1349     Updated: 11/08/21 2047    Narrative:      EXAMINATION: CT ANGIOGRAM HEAD W AI ANALYSIS OF LVO-, CT ANGIOGRAM  NECK-11/08/2021:      INDICATION: Acute Stroke; R47.9-Unspecified speech disturbances;  R03.0-Elevated blood-pressure reading, without diagnosis of  hypertension.      TECHNIQUE: CT angiogram head and neck with and without intravenous  contrast administration.     The radiation dose reduction device was turned on for each scan per the  ALARA (As Low as Reasonably Achievable) protocol.     AI analysis of LVO was utilized.     COMPARISON: CT cerebral perfusion and CT head, noncontrast, stroke  protocol head.     FINDINGS:      CTA NECK: Normal 3-vessel arch with patent great vessel origins.  Proximal subclavian arteries are patent. Vertebral arteries demonstrate  a right-sided dominance of caliber without focal severe stenosis,  aneurysm or occlusion. The carotids demonstrate grossly normal course  and branching pattern with mild-to-moderate atherosclerotic plaque  formation and calcifications as there is 10% right and 30% left luminal  narrowing as measured by NASCET criteria with patency of the distal  internal carotid arteries through the intracranial portions discussed  further below. Cervical soft tissue is unremarkable. The thyroid is  homogeneous in attenuation. Lung apices are grossly clear apart from  mild biapical pleural-parenchymal scarring and chronic change.      CTA HEAD: Distal internal carotid arteries demonstrate mild-to-moderate  atherosclerotic calcific disease, left slightly greater than right,  without distinct occlusion. Middle cerebral arteries demonstrate  irregularities throughout the bilateral MCA territories, right M2 and M3  segments, moderate-to-severe stenoses and irregularity along with area  of severe stenosis and near total occlusion left M3 segment, for example  series 9-image  59. Vertebrobasilar system and posterior cerebral  arteries demonstrate mild irregularities of atherosclerotic disease  without focal severe stenosis, aneurysm or occlusion. Venous structures  unremarkable with superior sagittal sinus grossly patent.       Impression:      Although no large vessel occlusion, middle cerebral arteries  demonstrate irregularities throughout the bilateral MCA territories,  right M2 and M3 segments, moderate-to-severe stenoses and irregularity  along with area of severe stenosis and near total occlusion left M3  segment, for example series 9- image 59.       D:  11/08/2021  E:  11/08/2021     This report was finalized on 11/8/2021 8:44 PM by Dr. Terry Summers.       CT CEREBRAL PERFUSION WITH & WITHOUT CONTRAST [[id number]] Collected: 11/08/21 1343     Updated: 11/08/21 2047    Narrative:      EXAMINATION: CT CEREBRAL PERFUSION W WO CONTRAST-11/08/2021:      INDICATION: Neuro deficit, acute, stroke suspected; R47.9-Unspecified  speech disturbances; R03.0-Elevated blood-pressure reading, without  diagnosis of hypertension.      TECHNIQUE: CT cerebral perfusion with and without intravenous contrast  administration. Multiple parametric maps including mean transit time,  time to drain, cerebral blood flow and cerebral blood volume performed.     The radiation dose reduction device was turned on for each scan per the  ALARA (As Low as Reasonably Achievable) protocol.     COMPARISON: CT head noncontrast and concurrent CT angiogram.     FINDINGS: Asymmetric area of abnormal perfusion in the left middle  cerebral artery territory posteriorly left frontal parietal involvement  with prolongation of mean transit time and time to drain along with  decreased cerebral blood flow, however, cerebral blood volume is  preserved, likely small area of reversible ischemia without core  infarct.       Impression:      Abnormal perfusion with small area of left posterior MCA  distribution frontoparietal region  reversible ischemia.     D:  11/08/2021  E:  11/08/2021           This report was finalized on 11/8/2021 8:44 PM by Dr. Terry Summers.       CT Angiogram Neck [[id number]] Collected: 11/08/21 1349     Updated: 11/08/21 2047    Narrative:      EXAMINATION: CT ANGIOGRAM HEAD W AI ANALYSIS OF LVO-, CT ANGIOGRAM  NECK-11/08/2021:      INDICATION: Acute Stroke; R47.9-Unspecified speech disturbances;  R03.0-Elevated blood-pressure reading, without diagnosis of  hypertension.      TECHNIQUE: CT angiogram head and neck with and without intravenous  contrast administration.     The radiation dose reduction device was turned on for each scan per the  ALARA (As Low as Reasonably Achievable) protocol.     AI analysis of LVO was utilized.     COMPARISON: CT cerebral perfusion and CT head, noncontrast, stroke  protocol head.     FINDINGS:      CTA NECK: Normal 3-vessel arch with patent great vessel origins.  Proximal subclavian arteries are patent. Vertebral arteries demonstrate  a right-sided dominance of caliber without focal severe stenosis,  aneurysm or occlusion. The carotids demonstrate grossly normal course  and branching pattern with mild-to-moderate atherosclerotic plaque  formation and calcifications as there is 10% right and 30% left luminal  narrowing as measured by NASCET criteria with patency of the distal  internal carotid arteries through the intracranial portions discussed  further below. Cervical soft tissue is unremarkable. The thyroid is  homogeneous in attenuation. Lung apices are grossly clear apart from  mild biapical pleural-parenchymal scarring and chronic change.      CTA HEAD: Distal internal carotid arteries demonstrate mild-to-moderate  atherosclerotic calcific disease, left slightly greater than right,  without distinct occlusion. Middle cerebral arteries demonstrate  irregularities throughout the bilateral MCA territories, right M2 and M3  segments, moderate-to-severe stenoses and irregularity along  with area  of severe stenosis and near total occlusion left M3 segment, for example  series 9-image 59. Vertebrobasilar system and posterior cerebral  arteries demonstrate mild irregularities of atherosclerotic disease  without focal severe stenosis, aneurysm or occlusion. Venous structures  unremarkable with superior sagittal sinus grossly patent.       Impression:      Although no large vessel occlusion, middle cerebral arteries  demonstrate irregularities throughout the bilateral MCA territories,  right M2 and M3 segments, moderate-to-severe stenoses and irregularity  along with area of severe stenosis and near total occlusion left M3  segment, for example series 9- image 59.       D:  11/08/2021  E:  11/08/2021     This report was finalized on 11/8/2021 8:44 PM by Dr. Terry Summers.       CT Head Without Contrast Stroke Protocol [[id number]] Collected: 11/08/21 1253     Updated: 11/08/21 2047    Narrative:      EXAMINATION: CT HEAD WO CONTRAST STROKE PROTOCOL-      INDICATION: Neuro deficit, acute, stroke suspected.      TECHNIQUE: CT head without intravenous contrast following stroke  protocol.     The radiation dose reduction device was turned on for each scan per the  ALARA (As Low as Reasonably Achievable) protocol.     COMPARISON: None.     FINDINGS: Midline structures are symmetric without evidence of mass,  mass effect or midline shift. Ventricles and sulci within normal limits.  No intraaxial hemorrhage or extraaxial fluid collection. Globes and  orbits are unremarkable. Paranasal sinuses and mastoid cells demonstrate  mucosal edema and fluid within the ethmoid air cells along with mild to  moderate mucosal thickening of the right maxillary sinus without  air-fluid level present. Mastoid air cells are grossly clear and well  pneumatized. Calvarium intact.       Impression:      1. No acute intracranial finding specifically no acute intracranial  hemorrhage.  2. Sinonasal mucosal disease centered within the  right maxillary sinus  and ethmoid air cells concerning for sinusitis or ethmoiditis.     NOTE: Scan performed on 11/08/2021 at 1238 hours. Scan report given to  treatment team in person at scanner by Dr. Summers on 11/08/2021 at 1244  hours.     D:  11/08/2021  E:  11/08/2021        This report was finalized on 11/8/2021 8:44 PM by Dr. Terry Summers.             Assessment:  Multiple acute embolic strokes, questionable cause    Plan:  Cardiology consult.    Transesophageal echocardiogram.    Transcranial Doppler.    Consider starting Eliquis tomorrow.    Hold Plavix for now    Comment:  Differential includes paroxysmal atrial fibrillation, PFO, or ascending aorta atheroma.         I discussed the patients findings and my recommendations with patient, family, nursing staff and primary care team    Matias Cherry MD  11/09/21  14:08 EST

## 2021-11-10 ENCOUNTER — APPOINTMENT (OUTPATIENT)
Dept: CARDIOLOGY | Facility: HOSPITAL | Age: 76
End: 2021-11-10

## 2021-11-10 PROBLEM — G47.34 NOCTURNAL OXYGEN DESATURATION: Status: ACTIVE | Noted: 2021-11-10

## 2021-11-10 PROBLEM — I10 HYPERTENSION: Status: ACTIVE | Noted: 2021-11-10

## 2021-11-10 PROBLEM — I67.9 CEREBROVASCULAR DISEASE: Status: ACTIVE | Noted: 2021-11-10

## 2021-11-10 LAB
ANION GAP SERPL CALCULATED.3IONS-SCNC: 8 MMOL/L (ref 5–15)
BASOPHILS # BLD AUTO: 0.07 10*3/MM3 (ref 0–0.2)
BASOPHILS NFR BLD AUTO: 0.9 % (ref 0–1.5)
BH CV ECHO MEAS - BSA(HAYCOCK): 2.2 M^2
BH CV ECHO MEAS - BSA: 2.2 M^2
BH CV ECHO MEAS - BZI_BMI: 25.2 KILOGRAMS/M^2
BH CV ECHO MEAS - BZI_METRIC_HEIGHT: 188 CM
BH CV ECHO MEAS - BZI_METRIC_WEIGHT: 88.9 KG
BH CV VAS BP LEFT ARM: NORMAL MMHG
BUN SERPL-MCNC: 15 MG/DL (ref 8–23)
BUN/CREAT SERPL: 14.3 (ref 7–25)
CALCIUM SPEC-SCNC: 8.8 MG/DL (ref 8.6–10.5)
CHLORIDE SERPL-SCNC: 106 MMOL/L (ref 98–107)
CO2 SERPL-SCNC: 26 MMOL/L (ref 22–29)
CREAT SERPL-MCNC: 1.05 MG/DL (ref 0.76–1.27)
DEPRECATED RDW RBC AUTO: 40.9 FL (ref 37–54)
EOSINOPHIL # BLD AUTO: 0.47 10*3/MM3 (ref 0–0.4)
EOSINOPHIL NFR BLD AUTO: 5.8 % (ref 0.3–6.2)
ERYTHROCYTE [DISTWIDTH] IN BLOOD BY AUTOMATED COUNT: 12.5 % (ref 12.3–15.4)
GFR SERPL CREATININE-BSD FRML MDRD: 69 ML/MIN/1.73
GLUCOSE SERPL-MCNC: 91 MG/DL (ref 65–99)
HCT VFR BLD AUTO: 42.9 % (ref 37.5–51)
HGB BLD-MCNC: 14.5 G/DL (ref 13–17.7)
IMM GRANULOCYTES # BLD AUTO: 0.03 10*3/MM3 (ref 0–0.05)
IMM GRANULOCYTES NFR BLD AUTO: 0.4 % (ref 0–0.5)
LV EF 2D ECHO EST: 55 %
LYMPHOCYTES # BLD AUTO: 2.1 10*3/MM3 (ref 0.7–3.1)
LYMPHOCYTES NFR BLD AUTO: 26 % (ref 19.6–45.3)
MCH RBC QN AUTO: 30.7 PG (ref 26.6–33)
MCHC RBC AUTO-ENTMCNC: 33.8 G/DL (ref 31.5–35.7)
MCV RBC AUTO: 90.9 FL (ref 79–97)
MONOCYTES # BLD AUTO: 0.99 10*3/MM3 (ref 0.1–0.9)
MONOCYTES NFR BLD AUTO: 12.3 % (ref 5–12)
NEUTROPHILS NFR BLD AUTO: 4.42 10*3/MM3 (ref 1.7–7)
NEUTROPHILS NFR BLD AUTO: 54.6 % (ref 42.7–76)
NRBC BLD AUTO-RTO: 0 /100 WBC (ref 0–0.2)
PLATELET # BLD AUTO: 181 10*3/MM3 (ref 140–450)
PMV BLD AUTO: 11 FL (ref 6–12)
POTASSIUM SERPL-SCNC: 4.3 MMOL/L (ref 3.5–5.2)
RBC # BLD AUTO: 4.72 10*6/MM3 (ref 4.14–5.8)
SODIUM SERPL-SCNC: 140 MMOL/L (ref 136–145)
WBC # BLD AUTO: 8.08 10*3/MM3 (ref 3.4–10.8)

## 2021-11-10 PROCEDURE — 93325 DOPPLER ECHO COLOR FLOW MAPG: CPT | Performed by: INTERNAL MEDICINE

## 2021-11-10 PROCEDURE — 25010000002 FENTANYL CITRATE (PF) 50 MCG/ML SOLUTION

## 2021-11-10 PROCEDURE — 93325 DOPPLER ECHO COLOR FLOW MAPG: CPT

## 2021-11-10 PROCEDURE — 93321 DOPPLER ECHO F-UP/LMTD STD: CPT | Performed by: INTERNAL MEDICINE

## 2021-11-10 PROCEDURE — 93312 ECHO TRANSESOPHAGEAL: CPT

## 2021-11-10 PROCEDURE — 85025 COMPLETE CBC W/AUTO DIFF WBC: CPT | Performed by: INTERNAL MEDICINE

## 2021-11-10 PROCEDURE — 80048 BASIC METABOLIC PNL TOTAL CA: CPT | Performed by: INTERNAL MEDICINE

## 2021-11-10 PROCEDURE — 93321 DOPPLER ECHO F-UP/LMTD STD: CPT

## 2021-11-10 PROCEDURE — 99232 SBSQ HOSP IP/OBS MODERATE 35: CPT | Performed by: PSYCHIATRY & NEUROLOGY

## 2021-11-10 PROCEDURE — 99232 SBSQ HOSP IP/OBS MODERATE 35: CPT | Performed by: INTERNAL MEDICINE

## 2021-11-10 PROCEDURE — 93312 ECHO TRANSESOPHAGEAL: CPT | Performed by: INTERNAL MEDICINE

## 2021-11-10 PROCEDURE — 25010000002 MIDAZOLAM PER 1 MG

## 2021-11-10 PROCEDURE — 99233 SBSQ HOSP IP/OBS HIGH 50: CPT | Performed by: INTERNAL MEDICINE

## 2021-11-10 RX ORDER — MIDAZOLAM HYDROCHLORIDE 1 MG/ML
INJECTION INTRAMUSCULAR; INTRAVENOUS
Status: COMPLETED
Start: 2021-11-10 | End: 2021-11-10

## 2021-11-10 RX ORDER — ATORVASTATIN CALCIUM 40 MG/1
40 TABLET, FILM COATED ORAL NIGHTLY
Status: DISCONTINUED | OUTPATIENT
Start: 2021-11-10 | End: 2021-11-12 | Stop reason: HOSPADM

## 2021-11-10 RX ORDER — FENTANYL CITRATE 50 UG/ML
INJECTION, SOLUTION INTRAMUSCULAR; INTRAVENOUS
Status: COMPLETED
Start: 2021-11-10 | End: 2021-11-10

## 2021-11-10 RX ADMIN — FENTANYL CITRATE 50 MCG: 50 INJECTION, SOLUTION INTRAMUSCULAR; INTRAVENOUS at 11:44

## 2021-11-10 RX ADMIN — APIXABAN 5 MG: 5 TABLET, FILM COATED ORAL at 20:12

## 2021-11-10 RX ADMIN — FENTANYL CITRATE 50 MCG: 50 INJECTION, SOLUTION INTRAMUSCULAR; INTRAVENOUS at 11:53

## 2021-11-10 RX ADMIN — AMLODIPINE BESYLATE: 5 TABLET ORAL at 14:23

## 2021-11-10 RX ADMIN — ASPIRIN 81 MG: 81 TABLET, COATED ORAL at 13:03

## 2021-11-10 RX ADMIN — SODIUM CHLORIDE, PRESERVATIVE FREE 10 ML: 5 INJECTION INTRAVENOUS at 20:12

## 2021-11-10 RX ADMIN — MIDAZOLAM HYDROCHLORIDE 1 MG: 1 INJECTION, SOLUTION INTRAMUSCULAR; INTRAVENOUS at 11:51

## 2021-11-10 RX ADMIN — APIXABAN 5 MG: 5 TABLET, FILM COATED ORAL at 13:04

## 2021-11-10 RX ADMIN — MIDAZOLAM HYDROCHLORIDE 2 MG: 1 INJECTION, SOLUTION INTRAMUSCULAR; INTRAVENOUS at 11:41

## 2021-11-10 RX ADMIN — FAMOTIDINE 20 MG: 20 TABLET, FILM COATED ORAL at 13:03

## 2021-11-10 RX ADMIN — ATORVASTATIN CALCIUM 40 MG: 40 TABLET, FILM COATED ORAL at 20:12

## 2021-11-10 RX ADMIN — SODIUM CHLORIDE, PRESERVATIVE FREE 10 ML: 5 INJECTION INTRAVENOUS at 08:26

## 2021-11-10 RX ADMIN — MIDAZOLAM HYDROCHLORIDE 1 MG: 1 INJECTION, SOLUTION INTRAMUSCULAR; INTRAVENOUS at 11:45

## 2021-11-10 RX ADMIN — FAMOTIDINE 20 MG: 20 TABLET, FILM COATED ORAL at 20:12

## 2021-11-10 NOTE — PLAN OF CARE
Goal Outcome Evaluation:  Plan of Care Reviewed With: patient, spouse        Progress: improving      Outcome Summary: NIH 0 this am - but pt does intermittently have aphasia - Dr. Cherry aware. 3L NC applied for MARISELA but pt now on room air. VSS. -170s - BP meds started. MARISELA negative. UOP adequate. Ambulated 450ft in [place], up to chair. Pt and spouse updated on plan of care.

## 2021-11-10 NOTE — PLAN OF CARE
Goal Outcome Evaluation:  Plan of Care Reviewed With: patient        Progress: improving  Outcome Summary: NIH 0 at start of shift but patient began demonstrating some expressive aphasia/word-finding difficulties early on and a repeat NIH was performed with a score of 1. Neuro consulted, patient has demonstrated this intermittently and recent CT was unchanged. Advised to monitor. Otherwise patient has remained neurologically intact and aphasia has not been consistent. Did demonstrate some apnea during sleep last night, dropping to around 80% and was placed on O2 at 2L per NC and had no more episodes of desaturation. Plans for MARISELA later today, has been NPO since midnight. AM labs pending.

## 2021-11-10 NOTE — PROGRESS NOTES
Torsten Jackson  [id number]  1945   LOS: 2 days   Patient Care Team:  Lauren Shah MD as PCP - General (Internal Medicine)    Chief Complaint:  EMBOLIC CVA / HTN / DYSLIPIDEMIA / PROBABLE SATYA    Subjective     Comfortable flat in bed off supplemental oxygen therapy with nominal room air oximetry (1%).  Apparently developed arterial desaturation to 80% while asleep last night and was placed on nasal oxygen at 2 L/min.  States he slept well and has no current complaints.  He denies headache, focal motor-sensory changes, increased dysarthria, focal weakness, nausea, emesis, anginal type chest discomfort, cough, sputum production, pleurisy, or hemoptysis.  No fever or chills.  Good spirits.    Objective     Vital Sign Min/Max for last 24 hours  Temp  Min: 96.4 °F (35.8 °C)  Max: 98.4 °F (36.9 °C)   BP  Min: 141/71  Max: 183/81   Pulse  Min: 44  Max: 56   Resp  Min: 14  Max: 18   SpO2  Min: 96 %  Max: 100 %        Weight  Min: 88.9 kg (196 lb)  Max: 88.9 kg (196 lb)         11/09/21  1057 11/09/21  1135   Weight: 88.9 kg (196 lb) 88.9 kg (196 lb)         Intake/Output Summary (Last 24 hours) at 11/10/2021 0739  Last data filed at 11/10/2021 0700  Gross per 24 hour   Intake 800 ml   Output 2060 ml   Net -1260 ml       Physical Exam:     General Appearance:    Alert, cooperative, in no acute distress   Lungs:     Clear to auscultation,respirations regular, even and                   unlabored    Heart:    Regular and normal rate, normal S1 and S2, grade 1/6 systolic murmur, no gallop, no rub, no click   Abdomen:  Extremities:   Soft, non-tender, bowel sounds audible x4    No edema, normal range of motion   Pulses:   Pulses palpable and equal bilaterally      Results Review:   Results from last 7 days   Lab Units 11/10/21  0442 11/08/21  1232 11/08/21  1232   SODIUM mmol/L 140  --  137   POTASSIUM mmol/L 4.3  --  4.6   CHLORIDE mmol/L 106  --  100   CO2 mmol/L 26.0  --  22.0   BUN mg/dL 15  --  17   CREATININE  mg/dL 1.05  --  1.05   GLUCOSE mg/dL 91   < > 94   CALCIUM mg/dL 8.8  --  9.0    < > = values in this interval not displayed.     Results from last 7 days   Lab Units 11/10/21  0442 11/08/21  1232   WBC 10*3/mm3 8.08 7.48   HEMOGLOBIN g/dL 14.5 14.7   HEMATOCRIT % 42.9 44.5   PLATELETS 10*3/mm3 181 201     Results from last 7 days   Lab Units 11/09/21  0600   CHOLESTEROL mg/dL 158   TRIGLYCERIDES mg/dL 171*   HDL CHOL mg/dL 30*   LDL CHOL mg/dL 98     Results from last 7 days   Lab Units 11/09/21  0600   HEMOGLOBIN A1C % 5.20     Results from last 7 days   Lab Units 11/08/21  1232   TROPONIN T ng/mL <0.010     · CT HEAD WO CONTRAST:    FINDINGS: Recently noted areas of small acute infarct in the right  frontal lobe and left temporal lobe are not well appreciated on CT.  There is no distinct evidence of new large territory acute ischemia.  There is no intracranial hemorrhage, mass or mass effect. The ventricles  are unchanged in size and figuration. The orbits are unremarkable and  the paranasal sinuses are grossly     IMPRESSION:  Recently noted areas of small acute infarct in the right  frontal lobe and left temporal lobe are not well appreciated on CT.  There is no distinct evidence of acute territorial ischemia. There is no  intracranial hemorrhage, mass or mass effect.     · CAROTID DUPLEX:    · Right internal carotid artery stenosis of 0-49%.  · Left internal carotid artery stenosis of 0-49%.  · Bilateral antegrade vertebral flow.      · TCD:    Normal mean velocities and waveforms are seen in the right and left MCA and terminal ICA     Following injection of agitated saline, both before and after Valsalva, no abnormal signals are seen in either MCA     Negative bubble study    · TTE:    · Estimated right ventricular systolic pressure from tricuspid regurgitation is normal (<35 mmHg). Calculated right ventricular systolic pressure from tricuspid regurgitation is 25 mmHg.  · Saline test results are negative for  right to left atrial level shunt.  · Left ventricular wall thickness is consistent with mild concentric hypertrophy.  · Estimated left ventricular EF = 60% Estimated left ventricular EF was in agreement with the calculated left ventricular EF. Left ventricular ejection fraction appears to be 56 - 60%. Left ventricular systolic function is normal.  · Left ventricular diastolic function is consistent with (grade II w/high LAP) pseudonormalization.  · There is calcification of the aortic valve mainly affecting the non-coronary and left coronary cusp(s).  · Normal right ventricular cavity size, wall thickness, systolic function and septal motion noted.  · The aortic valve exhibits moderate sclerosis.  · No evidence of pulmonary hypertension is present.  · There is no evidence of pericardial effusion.  · No evidence of a patent foramen ovale.    · NO EKG.    Medication Review: REVIEWED; NO DRIPS.    Assessment/Plan     Continued labile hypertension.  Normal sinus rhythm without documented atrial tachyarrhythmias through the night.  Neurologic status appears to be intact.  Scheduled for MARISELA at 1100 hrs. with Dr. Grossman.  Home potentially later today on the following discharge cardiac medications and follow-up:    · Amlodipine/valsartan 5/160 daily  · Apixaban 5 mg BID  · ASA 81 mg daily  · Atorvastatin 80 mg daily  · Pepcid 20 mg BID  · E patch x 2 weeks  · Strong consideration implantable loop recorder if E patch unremarkable  · [facility] cardiology follow-up Dr. Ratliff 4-6 weeks  · Strong consideration of outpatient sleep study to assess for obstructive sleep apnea.      Acute CVA manifesting only as expressive aphasia (HCC)    History of glaucoma crisis 2016 resolved post bilateral cataract extraction    BPH (benign prostatic hyperplasia).  Not on therapy    11/10/21  07:39 EST

## 2021-11-10 NOTE — PROGRESS NOTES
Intensivist Note     11/10/2021  Hospital Day: 2  * No surgery found *  ICU Stays Timeline            Hospital Admission: 11/08/21 1242 - Current  ICU stays: 1      In Date/Time Event Department ICU Stay Duration     11/08/21 1242 Admission  [facility]      11/08/21 1603 Transfer In Critical access hospital 2B ICU 1 day 15 hours 7 minutes             Mr. Torsten Jackson, 76 y.o. 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  Rule out SATYA    History of glaucoma crisis 2016 resolved post bilateral cataract extraction    BPH (benign prostatic hyperplasia).  Not on therapy       SUBJECTIVE     76-year-old  white male lifelong non-smoker with no prior medical problems except for BPH and glaucoma.  Patient indicates that he was well the day of admission at 11:30 AM on 11/8/2021 and was getting ready to go to the gym.  Subsequently developed transient dysarthria and expressive aphasia/word salad (wife indicates that she had difficulty understanding him).  Also apparently noted some mild tingling or numbness of the left upper extremity.  Came directly to [facility] ER and by the time of his arrival his symptoms had resolved with NIHSS 0.  Blood pressure was noted however to be 196/86 (he does not have underlying hypertension).  At the time he arrived he had no unilateral weakness, sensory loss, diplopia or other visual abnormalities, or loss of balance.  Also denied headache, nausea, vomiting.     On neurology exam there was no focal abnormality or dysarthria in the ER.  CT head revealed no acute intracranial findings or evidence of ICH.  CTA revealed irregularities throughout bilateral MCA territories, right M2 and M3 segments, and moderate to severe stenosis and irregularity with near-total occlusion of left M3.  CT perfusion was  subsequently obtained revealing a small area of abnormal perfusion/reversible ischemia in the frontoparietal region in the left posterior MCA distribution. He was felt to be an appropriate candidate for TPA and infusion begun at 1340. I saw the patient approximately 30 to 35 minutes into his TPA infusion and he had developed recurrent onset of his expressive aphasia.  His wife indicated that it was not as severe as it had been at home, but was worse than upon arrival in the ER and now had recurred despite TPA.  Was immediately sent for a standard CT head which was unchanged without any evidence of hemorrhage.  He was on Cardene at the time this occurred and his SBP had decreased to 140 (from his original pressure of 196/86).  The recurrence of symptoms was felt related to lowering his pressure so the Cardene was discontinued.  Patient only received partial TPA (received approximately one half dose as infusion was stopped after 30 minutes).  By 11/9/2021 the patient was asymptomatic but MRI revealed multifocal small areas of acute infarct involving right frontal lobe and left posterior temporal lobe suggesting embolic source.  Echocardiogram was unrevealing so was scheduled for a MARISELA 11/11/2021.  Dr. Cherry and Dr. Stark reviewed patient's CTA again and although no large vessel occlusions, as noted previously the MCAs revealed irregularities throughout the bilateral MCA territory, moderate to severe stenoses, as well as an area of near-total occlusion or thrombus of the left M3.  Because of this Eliquis was started 11/9/2021     Interval history:  No major neurologic issues overnight but still feels he has some intermittent difficulty expressing self then subsequently symptoms resolve.  Hemodynamics are adequate and in fact is somewhat hypertensive at 172/92.  Pulse remains low at 48 bpm (this is apparently not unusual for him).  UOP is adequate and BUN/creatinine remain normal.  O2 sats are good on room air but  "apparently was noted last evening to drop his saturation into the low 80s when sleeping.  Was put on 2 L with resolution.  Has no prior history of obstructive sleep apnea although apparently he does snore some according to wife.  Was undergoing MARISELA upon my arrival to the room and cardiology indicates that they see no valvular abnormalities, PFO etc.  Patient denies dyspnea, cough, purulent sputum production, hemoptysis, or pleuritic pain.  No unilateral weakness, decreased sensation, instability or vertigo.  No blurred vision or diplopia.  No nausea, vomiting, diarrhea and no difficulty voiding       ROS: Per subjective, all other systems reviewed and were negative.    The patient's relevant PMH, PSH, FH, and SH were reviewed and updated in Epic as appropriate. Allergies and Medications reviewed.    OBJECTIVE     /77   Pulse (!) 46   Temp 97.7 °F (36.5 °C) (Oral)   Resp 16   Ht 188 cm (74\")   Wt 88.9 kg (196 lb)   SpO2 99%   BMI 25.16 kg/m²      Flow (L/min): 3    Flowsheet Rows      First Filed Value   Admission Height 189.2 cm (74.5\") Documented at 11/08/2021 1221   Admission Weight 89.8 kg (198 lb) Documented at 11/08/2021 1221        Intake & Output (last day)       11/09 0701  11/10 0700 11/10 0701  11/11 0700    P.O. 800     I.V. (mL/kg)      Total Intake(mL/kg) 800 (9)     Urine (mL/kg/hr) 2060 (1) 200 (0.3)    Stool  0    Total Output 2060 200    Net -1260 -200          Urine Unmeasured Occurrence 4 x 1 x    Stool Unmeasured Occurrence  1 x          Exam:  General Exam:  Well-developed tall thin white male propped up in bed in NAD  HEENT: Pupils equal and reactive. Nose and throat clear.  Neck:                          Supple, no JVD, thyromegaly, or adenopathy  Lungs: Clear to auscultation and percussion anteriorly and posteriorly.  Cardiovascular: Regular rate and rhythm without murmurs or gallops.  Abdomen: Soft nontender without organomegaly or masses.   and " rectal: Deferred.  Extremities: No cyanosis clubbing edema.  Neurologic:                 Symmetric strength. No focal deficits.    Chest X-Ray: No film today    INFUSIONS  niCARdipine, 5-15 mg/hr, Last Rate: Stopped (11/08/21 1446)        Results from last 7 days   Lab Units 11/10/21  0442 11/08/21  1232   WBC 10*3/mm3 8.08 7.48   HEMOGLOBIN g/dL 14.5 14.7   HEMATOCRIT % 42.9 44.5   PLATELETS 10*3/mm3 181 201     Results from last 7 days   Lab Units 11/10/21  0442 11/08/21  1232   SODIUM mmol/L 140 137   POTASSIUM mmol/L 4.3 4.6   CHLORIDE mmol/L 106 100   CO2 mmol/L 26.0 22.0   BUN mg/dL 15 17   CREATININE mg/dL 1.05 1.05   GLUCOSE mg/dL 91 94   CALCIUM mg/dL 8.8 9.0         Results from last 7 days   Lab Units 11/08/21  1232   ALK PHOS U/L 79   BILIRUBIN mg/dL 0.7   ALT (SGPT) U/L 19  19   AST (SGOT) U/L 26  23       No results found for: SEDRATE  No results found for: BNP  Lab Results   Component Value Date    TROPONINT <0.010 11/08/2021     No results found for: TSH  No results found for: LACTATE  No results found for: CORTISOL          I reviewed the patient's results, images and medication.    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  Rule out SATYA    History of glaucoma crisis 2016 resolved post bilateral cataract extraction    BPH (benign prostatic hyperplasia).  Not on therapy      DISCUSSION: Appears to be doing extremely well.  It is apparent that he had bilateral emboli but source has not been defined on TTE or MARISELA.  In addition has had no atrial arrhythmias documented during his hospital stay.  I plan to observe the patient overnight (for recurrent nocturnal oxygen desaturation) and if all is well tomorrow will discharge home.  Plan at present is to 1. Fully anticoagulate with Eliquis plus  aspirin  2. Discharge with a ZIO Patch with follow-up in the heart valve clinic where the patch can be downloaded. 3. If the patch shows atrial fibrillation it just confirms the etiology of this event.  Even if negative, suspicion for atrial fibrillation is so high that Dr. Ratliff feels that he should have more prolonged monitoring with an implantable loop recorder.  4. Treat blood pressure with amlodipine/valsartan 5/160  5. Agree with Dr. Ratliff that patient should have a sleep study performed as an outpatient    PLAN     Discharge tomorrow on Eliquis 5 mg every 12 hours  Aspirin 81 mg daily  Send out with a Zio patch with follow-up in the heart valve clinic in 2 weeks where patch can be downloaded  If patch shows atrial fibrillation it just confirms the etiology of his embolic stroke  If no arrhythmias with Zio patch suspicion for atrial fibrillation is so high that cardiology feels he should have an implantable loop recorder placed  Is to follow-up with Dr. Ratliff 2 to 4 weeks after his heart valve clinic appointment  Nursing to observe patient tonight on room air to see if he desaturates when asleep.    If O2 sats drop to 88% or less would set up nocturnal oxygen for home use and set up sleep study  Home on amlodipine 5 mg/valsartan 160 mg (Exforge)  Lipitor 40 mg nightly      Plan of care and goals reviewed with multidisciplinary team at daily rounds.    I discussed the patient's findings and my recommendations with patient, family, nursing staff and consulting provider      Time spent Critical care 30 min (It does not include procedure time).    Electronically signed by Lionel Santos MD, 11/10/21, 7:10 AM EST.   Pulmonary / Critical care medicine

## 2021-11-10 NOTE — PROGRESS NOTES
Cardiology MARISELA note:    MARISELA was completed without complications.  No cardiac source of emboli demonstrated.  No left atrial appendage thrombus, interatrial shunting, significant aortic atheroma.

## 2021-11-10 NOTE — CASE MANAGEMENT/SOCIAL WORK
Continued Stay Note   [facility]     Patient Name: Torsten Jackson  MRN: [id number]  Today's Date: 11/10/2021    Admit Date: 11/8/2021     Discharge Plan     Row Name 11/10/21 1355       Plan    Plan Home w/Outpatient SLP    Patient/Family in Agreement with Plan yes    Plan Comments Spoke with patient in room.  His plan is still to return home at discharge.  Therapy recommends outpatient SLP at discharge.  Patient agreeable.  Referral placed in [facility] for [facility]patient SLP.  They will call patient to schedule visit.  No other needs noted.  CM will continue to follow.  Mya Madsen RN [phone]    Final Discharge Disposition Code 01 - home or self-care               Discharge Codes    No documentation.               Expected Discharge Date and Time     Expected Discharge Date Expected Discharge Time    Nov 11, 2021             Mya Madsen RN

## 2021-11-10 NOTE — NURSING NOTE
1138 Dr. Grossman at bedside to perform MARISELA    Consent signed. Time out.     See MAR for medications given.     1149 Scope in    1200 Scope out     1201 MD out of room    Pt recovered in room.

## 2021-11-10 NOTE — PROGRESS NOTES
Neurology       Patient Care Team:  Lauren Shah MD as PCP - General (Internal Medicine)    Chief complaint: Embolic stroke    History: None the patient is having some intermittent word blocking particular when he is tired or stressed.    In between is fine.    He has had no other symptoms.      Past Medical History:   Diagnosis Date   • Glaucoma        Vital Signs   Vitals:    11/10/21 1150 11/10/21 1155 11/10/21 1200 11/10/21 1205   BP: (!) 194/102 (!) 195/88 143/82 149/88   BP Location:   Left arm    Patient Position:   Lying    Pulse: 62 56 52 55   Resp:   16    Temp:       TempSrc:       SpO2: 96% 98% 96% 96%   Weight:       Height:           Physical Exam:   General: Pressures been as high as 194/102 this morning.                  Neuro: Awake and alert.  He is oriented to person place and time.    He demonstrates mild word blocking today.    Cranial nerves are symmetrical.    He moves all extremities well.        Results Review:  MARISELA is pending.    Transcranial Doppler is negative.    LDL cholesterol is 98.      Results from last 7 days   Lab Units 11/10/21  0442   WBC 10*3/mm3 8.08   HEMOGLOBIN g/dL 14.5   HEMATOCRIT % 42.9   PLATELETS 10*3/mm3 181     Results from last 7 days   Lab Units 11/10/21  0442 11/08/21  1232   SODIUM mmol/L 140 137   POTASSIUM mmol/L 4.3 4.6   CHLORIDE mmol/L 106 100   CO2 mmol/L 26.0 22.0   BUN mg/dL 15 17   CREATININE mg/dL 1.05 1.05   CALCIUM mg/dL 8.8 9.0   BILIRUBIN mg/dL  --  0.7   ALK PHOS U/L  --  79   ALT (SGPT) U/L  --  19  19   AST (SGOT) U/L  --  26  23   GLUCOSE mg/dL 91 94       Imaging Results (Last 24 Hours)     Procedure Component Value Units Date/Time    CT Head Without Contrast [[id number]] Collected: 11/09/21 1522     Updated: 11/09/21 1527    Narrative:      EXAMINATION: CT HEAD WO CONTRAST-      INDICATION: Stroke, follow up; I63.9-Cerebral infarction, unspecified;  R47.9-Unspecified speech disturbances; R03.0-Elevated blood-pressure  reading, without  diagnosis of hypertension     TECHNIQUE: Axial noncontrast CT of the head with multiplanar  reconstruction     The radiation dose reduction device was turned on for each scan per the  ALARA (As Low as Reasonably Achievable) protocol.     COMPARISON: MRI performed earlier the same day     FINDINGS: Recently noted areas of small acute infarct in the right  frontal lobe and left temporal lobe are not well appreciated on CT.  There is no distinct evidence of new large territory acute ischemia.  There is no intracranial hemorrhage, mass or mass effect. The ventricles  are unchanged in size and figuration. The orbits are unremarkable and  the paranasal sinuses are grossly       Impression:      Recently noted areas of small acute infarct in the right  frontal lobe and left temporal lobe are not well appreciated on CT.  There is no distinct evidence of acute territorial ischemia. There is no  intracranial hemorrhage, mass or mass effect.         This report was finalized on 11/9/2021 3:24 PM by Humphrey Lopes.             Assessment:  Multiple embolic strokes with mild residual expressive aphasia    Evidence of intracranial small vessel disease.        Hypertension.    Mild dyslipidemia.        Plan:  Best medical therapy to include Lipitor 40 mg daily, aspirin 81 mg daily.    Blood pressure control to 130/80 or less.    Lifelong Eliquis.    Patient cardiac monitoring, choice of Dr. Ratliff.    Sleep study after discharge.        Comment:  Patient probably ready to be discharged tomorrow.    Talk with Dr. Grossman about his MARISELA later today.         I discussed the patients findings and my recommendations with patient, family, nursing staff and primary care team    Matias Cherry MD  11/10/21  13:09 EST

## 2021-11-11 PROBLEM — I48.0 PAROXYSMAL ATRIAL FIBRILLATION WITH RAPID VENTRICULAR RESPONSE: Status: ACTIVE | Noted: 2021-11-11

## 2021-11-11 LAB
ANION GAP SERPL CALCULATED.3IONS-SCNC: 11 MMOL/L (ref 5–15)
BUN SERPL-MCNC: 14 MG/DL (ref 8–23)
BUN/CREAT SERPL: 14.9 (ref 7–25)
CALCIUM SPEC-SCNC: 8.8 MG/DL (ref 8.6–10.5)
CHLORIDE SERPL-SCNC: 104 MMOL/L (ref 98–107)
CO2 SERPL-SCNC: 24 MMOL/L (ref 22–29)
CREAT SERPL-MCNC: 0.94 MG/DL (ref 0.76–1.27)
GFR SERPL CREATININE-BSD FRML MDRD: 78 ML/MIN/1.73
GLUCOSE SERPL-MCNC: 104 MG/DL (ref 65–99)
POTASSIUM SERPL-SCNC: 4.1 MMOL/L (ref 3.5–5.2)
QT INTERVAL: 364 MS
QTC INTERVAL: 404 MS
SODIUM SERPL-SCNC: 139 MMOL/L (ref 136–145)
TSH SERPL DL<=0.05 MIU/L-ACNC: 2.24 UIU/ML (ref 0.27–4.2)

## 2021-11-11 PROCEDURE — 99233 SBSQ HOSP IP/OBS HIGH 50: CPT | Performed by: INTERNAL MEDICINE

## 2021-11-11 PROCEDURE — 80048 BASIC METABOLIC PNL TOTAL CA: CPT | Performed by: INTERNAL MEDICINE

## 2021-11-11 PROCEDURE — 93005 ELECTROCARDIOGRAM TRACING: CPT | Performed by: NURSE PRACTITIONER

## 2021-11-11 PROCEDURE — 84443 ASSAY THYROID STIM HORMONE: CPT | Performed by: INTERNAL MEDICINE

## 2021-11-11 PROCEDURE — 99232 SBSQ HOSP IP/OBS MODERATE 35: CPT | Performed by: PSYCHIATRY & NEUROLOGY

## 2021-11-11 PROCEDURE — 99232 SBSQ HOSP IP/OBS MODERATE 35: CPT | Performed by: INTERNAL MEDICINE

## 2021-11-11 PROCEDURE — 93010 ELECTROCARDIOGRAM REPORT: CPT | Performed by: INTERNAL MEDICINE

## 2021-11-11 RX ORDER — SODIUM CHLORIDE 0.9 % (FLUSH) 0.9 %
10 SYRINGE (ML) INJECTION EVERY 12 HOURS SCHEDULED
Status: CANCELLED | OUTPATIENT
Start: 2021-11-11

## 2021-11-11 RX ORDER — SODIUM CHLORIDE 0.9 % (FLUSH) 0.9 %
10 SYRINGE (ML) INJECTION AS NEEDED
Status: CANCELLED | OUTPATIENT
Start: 2021-11-11

## 2021-11-11 RX ADMIN — SODIUM CHLORIDE, PRESERVATIVE FREE 10 ML: 5 INJECTION INTRAVENOUS at 20:07

## 2021-11-11 RX ADMIN — APIXABAN 5 MG: 5 TABLET, FILM COATED ORAL at 08:12

## 2021-11-11 RX ADMIN — ASPIRIN 81 MG: 81 TABLET, COATED ORAL at 08:12

## 2021-11-11 RX ADMIN — DRONEDARONE 400 MG: 400 TABLET, FILM COATED ORAL at 20:07

## 2021-11-11 RX ADMIN — AMLODIPINE BESYLATE: 5 TABLET ORAL at 08:12

## 2021-11-11 RX ADMIN — APIXABAN 5 MG: 5 TABLET, FILM COATED ORAL at 20:06

## 2021-11-11 RX ADMIN — ATORVASTATIN CALCIUM 40 MG: 40 TABLET, FILM COATED ORAL at 20:06

## 2021-11-11 RX ADMIN — SODIUM CHLORIDE, PRESERVATIVE FREE 10 ML: 5 INJECTION INTRAVENOUS at 08:13

## 2021-11-11 RX ADMIN — FAMOTIDINE 20 MG: 20 TABLET, FILM COATED ORAL at 08:12

## 2021-11-11 RX ADMIN — FAMOTIDINE 20 MG: 20 TABLET, FILM COATED ORAL at 20:06

## 2021-11-11 RX ADMIN — DRONEDARONE 400 MG: 400 TABLET, FILM COATED ORAL at 09:06

## 2021-11-11 NOTE — PROGRESS NOTES
Torsten Jackson  [id number]  1945   LOS: 3 days   Patient Care Team:  Lauren Shah MD as PCP - General (Internal Medicine)    Chief Complaint:  EMBOLIC CVA / HTN / ATRIAL FLUTTER / AT RISK FOR SATYA    Subjective     Asymptomatic this morning flat in bed off supplemental oxygen therapy. He Is unaware of his atrial flutter.  Denies anginal type chest discomfort, increased tachypnea/dyspnea, nausea, emesis, abdominal pain, headache, or additional focal motor-sensory changes.  He still has intermittent expressive aphasia.    Objective     Vital Sign Min/Max for last 24 hours  Temp  Min: 97.7 °F (36.5 °C)  Max: 98.7 °F (37.1 °C)   BP  Min: 105/49  Max: 195/88   Pulse  Min: 42  Max: 123   Resp  Min: 16  Max: 18   SpO2  Min: 93 %  Max: 100 %               11/09/21  1057 11/09/21  1135   Weight: 88.9 kg (196 lb) 88.9 kg (196 lb)         Intake/Output Summary (Last 24 hours) at 11/11/2021 0742  Last data filed at 11/11/2021 0600  Gross per 24 hour   Intake 850 ml   Output 1100 ml   Net -250 ml       Physical Exam:     General Appearance:    Alert, cooperative, in no acute distress   Lungs:     Clear to auscultation,respirations regular, even and                   unlabored    Heart:    Irregular and normal rate, normal S1 and S2, a 1/6 systolic        murmur, no gallop, no rub, no click   Abdomen:  Extremities:   Soft, non-tender, bowel sounds audible x4    No edema, normal range of motion   Pulses:   Pulses palpable and equal bilaterally      Results Review:   Results from last 7 days   Lab Units 11/10/21  0442 11/08/21  1232 11/08/21  1232   SODIUM mmol/L 140  --  137   POTASSIUM mmol/L 4.3  --  4.6   CHLORIDE mmol/L 106  --  100   CO2 mmol/L 26.0  --  22.0   BUN mg/dL 15  --  17   CREATININE mg/dL 1.05  --  1.05   GLUCOSE mg/dL 91   < > 94   CALCIUM mg/dL 8.8  --  9.0    < > = values in this interval not displayed.     Results from last 7 days   Lab Units 11/10/21  0442 11/08/21  1232   WBC 10*3/mm3 8.08 7.48    HEMOGLOBIN g/dL 14.5 14.7   HEMATOCRIT % 42.9 44.5   PLATELETS 10*3/mm3 181 201     Results from last 7 days   Lab Units 11/09/21  0600   CHOLESTEROL mg/dL 158   TRIGLYCERIDES mg/dL 171*   HDL CHOL mg/dL 30*   LDL CHOL mg/dL 98     Results from last 7 days   Lab Units 11/09/21  0600   HEMOGLOBIN A1C % 5.20     Results from last 7 days   Lab Units 11/08/21  1232   TROPONIN T ng/mL <0.010     · MARISELA:    · Estimated left ventricular EF = 55% Left ventricular systolic function is normal.  · Left ventricular wall thickness is consistent with mild concentric hypertrophy  · The left atrial cavity is mild to moderately dilated  · No evidence of a left atrial appendage thrombus was present.  · No evidence of a patent foramen ovale. No evidence of an atrial septal defect present. Saline test results are negative.  · The aortic valve exhibits sclerosis.  · Mild mitral annular calcification is present. Mild mitral valve regurgitation is present  · There is (grade 1) plaque in the descending aorta present.  · No obvious cardiac source of emboli demonstrated on this study.    · EKG:    Atrial flutter with variable AV block  Possible Anterior infarct , age undetermined  T wave abnormality, consider inferior ischemia  Abnormal ECG  When compared with ECG of 08-NOV-2021 13:21,  Atrial flutter has replaced Sinus rhythm  Borderline criteria for Anterior infarct are now present  ST now depressed in Inferior leads  T wave inversion now evident in Inferior leads    Medication Review: REVIEWED; NO DRIPS.    Assessment/Plan     Events noted; would add Multaq 400 mg BID and have him follow-up in [facility] next week with electrocardiogram and to consider placement of a 2-week event monitor patch if he is back in normal sinus rhythm.  Needs screening as an outpatient for obstructive sleep apnea.  Tentative discharge medications and follow-up as outlined yesterday with the addition of Multaq 400 mg BID.  Would defer  beta-blocker drug therapy at this time unless on follow-up in 1 week his heart rate or blood pressure are increased at that time and would add nebivolol 5 mg daily.    Discussed with patient, RN, and Dr. Santos.  Thanks.      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  Not on therapy    Intracranial cerebrovascular disease in bilateral MCA territories    Hypertension diagnosed this admission    Nocturnal oxygen desaturation this admission.  Rule out SATYA    11/11/21  07:42 EST

## 2021-11-11 NOTE — PLAN OF CARE
Goal Outcome Evaluation: pt alert and oriented x 4. Antiarrythmic initiated this am, sbp <110 , pt with increasing dysarthria, arnp notified as well as stroke danya. See recommendations on chart. Pt in good spirits, questions answered and support given.

## 2021-11-11 NOTE — PROGRESS NOTES
Intensivist Note     11/11/2021  Hospital Day: 3  * No surgery found *  ICU Stays Timeline            Hospital Admission: 11/08/21 1242 - Current  ICU stays: 1      In Date/Time Event Department ICU Stay Duration     11/08/21 1242 Admission  [facility]      11/08/21 1603 Transfer In [facility] 2B ICU 2 days 15 hours 11 minutes             Mr. Torsten Jackson, 76 y.o. 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 New diagnosis 11/11/2021 (HCC)    Hypertension diagnosed this admission    Nocturnal oxygen desaturation this admission.  Rule out SATYA    History of glaucoma crisis 2016 resolved post bilateral cataract extraction    BPH (benign prostatic hyperplasia).  Not on therapy       SUBJECTIVE     76-year-old  white male lifelong non-smoker with no prior medical problems except for BPH and glaucoma.  Patient indicates that he was well the day of admission at 11:30 AM on 11/8/2021 and was getting ready to go to the gym.  Subsequently developed transient dysarthria and expressive aphasia/word salad (wife indicates that she had difficulty understanding him).  Also apparently noted some mild tingling or numbness of the left upper extremity.  Came directly to [facility] ER and by the time of his arrival his symptoms had resolved with NIHSS 0.  Blood pressure was noted however to be 196/86 (he does not have underlying hypertension).  At the time he arrived he had no unilateral weakness, sensory loss, diplopia, blurred vision, or loss of balance.  Also denied headache, nausea, vomiting.  In addition his dysarthria had resolved. CT head revealed no acute intracranial findings or evidence of ICH.  CTA revealed irregularities throughout bilateral MCA territories, right M2 and M3 segments, and moderate to severe stenosis and irregularity with near-total occlusion of left  M3.  CT perfusion was subsequently obtained revealing a small area of abnormal perfusion/reversible ischemia in the frontoparietal region in the left posterior MCA distribution. He was felt to be an appropriate candidate for TPA and infusion begun at 1340. I saw the patient approximately 30 to 35 minutes into his TPA infusion and he had developed recurrent onset of his expressive aphasia. It was not as severe as it had been at home, but was worse than upon arrival in the ER and had recurred despite TPA.  Was immediately sent for a standard CT head which was unchanged without any evidence of hemorrhage.  He was on Cardene at the time this occurred and his SBP had decreased to 140 (from his original pressure of 196/86).  The recurrence of symptoms was felt related to lowering his pressure so the Cardene was discontinued.  Patient only received partial TPA (received approximately one half dose as infusion was stopped after 30 minutes).  By 11/9/2021 the patient was asymptomatic but MRI revealed multifocal small areas of acute infarct involving right frontal lobe and left posterior temporal lobe suggesting central embolic source.  Echocardiogram was unrevealing so was scheduled for a MARISELA 11/11/2021.  Dr. Cherry and Dr. Stark reviewed patient's CTA again and although no large vessel occlusions, as noted previously there were irregularities throughout the bilateral MCA territory, moderate to severe stenoses, as well as an area of near-total occlusion or thrombus of the left M3.  Because of this Eliquis was started 11/9/2021.  MARISELA was subsequently performed 11/10/2021 and revealed no valvular abnormalities, PFO, etc.  An additional problem noted during this hospital stay has been drops in his O2 sats on room air at night while sleeping.  Wife indicates he snores at home but does not have prolonged apnea.     Interval history: At 5 AM this morning patient developed atrial fibrillation/atrial flutter with rates ranging from  "130 as high as 190.  There was no hypotension, patient was asymptomatic, and he specifically denied any new neurologic symptoms.  Rate was controlled after of Valsalva maneuver and since he was already on Eliquis no additional therapy was started.  At the time of my arrival the patient's heart rate was 85 although he was still in atrial fibrillation.  I discussed the case with Dr. Ratliff this morning and it was decided he could be started on Maxipime and if he had no problems after 3 hours he could still be discharged home.  Subsequent to this the wife noted some mild difficulty with word finding and was nervous about the patient going home.  Neurology came by and asked that the patient remain in the hospital another night.  Patient remains afebrile at the present time and appears hemodynamically stable.  Chemistries were normal as well as TSH.  He denies any dyspnea.         ROS: Per subjective, all other systems reviewed and were negative.    The patient's relevant PMH, PSH, FH, and SH were reviewed and updated in Epic as appropriate. Allergies and Medications reviewed.    OBJECTIVE     /79   Pulse 75   Temp 97.8 °F (36.6 °C) (Oral)   Resp 20   Ht 188 cm (74\")   Wt 88.9 kg (196 lb)   SpO2 96%   BMI 25.16 kg/m²      Flow (L/min): 3    Flowsheet Rows      First Filed Value   Admission Height 189.2 cm (74.5\") Documented at 11/08/2021 1221   Admission Weight 89.8 kg (198 lb) Documented at 11/08/2021 1221        Intake & Output (last day)       11/10 0701  11/11 0700 11/11 0701  11/12 0700    P.O. 850 600    Total Intake(mL/kg) 850 (9.6) 600 (6.7)    Urine (mL/kg/hr) 1100 (0.5)     Stool 0     Total Output 1100     Net -250 +600          Urine Unmeasured Occurrence 3 x 2 x    Stool Unmeasured Occurrence 1 x           Exam:  General Exam:  Well-developed well-nourished white male in NAD  HEENT: Pupils equal and reactive. Nose and throat clear.  Neck:                          Supple, no JVD, thyromegaly, or " adenopathy  Lungs: Clear anteriorly and laterally  Cardiovascular: Irregularly irregular rhythm with variable S1 and normal S2.  HR 85 and atrial fibrillation on monitor  Abdomen: Soft nontender without organomegaly or masses.   and rectal: Deferred.  Extremities: No cyanosis clubbing edema.  Neurologic:                 Symmetric strength. No focal deficits.  Still with occasional minimal intermittent expressive aphasia    Chest X-Ray: No film today    INFUSIONS  niCARdipine, 5-15 mg/hr, Last Rate: Stopped (11/08/21 1446)        Results from last 7 days   Lab Units 11/10/21  0442 11/08/21  1232   WBC 10*3/mm3 8.08 7.48   HEMOGLOBIN g/dL 14.5 14.7   HEMATOCRIT % 42.9 44.5   PLATELETS 10*3/mm3 181 201     Results from last 7 days   Lab Units 11/11/21  0818 11/10/21  0442   SODIUM mmol/L 139 140   POTASSIUM mmol/L 4.1 4.3   CHLORIDE mmol/L 104 106   CO2 mmol/L 24.0 26.0   BUN mg/dL 14 15   CREATININE mg/dL 0.94 1.05   GLUCOSE mg/dL 104* 91   CALCIUM mg/dL 8.8 8.8         Results from last 7 days   Lab Units 11/08/21  1232   ALK PHOS U/L 79   BILIRUBIN mg/dL 0.7   ALT (SGPT) U/L 19  19   AST (SGOT) U/L 26  23       No results found for: SEDRATE  No results found for: BNP  Lab Results   Component Value Date    TROPONINT <0.010 11/08/2021     Lab Results   Component Value Date    TSH 2.240 11/11/2021     No results found for: LACTATE  No results found for: CORTISOL      I reviewed the patient's results, images and medication.    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 New diagnosis 11/11/2021 (HCC)    Hypertension diagnosed this admission    Nocturnal oxygen desaturation this admission.  Rule out SATYA    History of glaucoma crisis 2016 resolved post bilateral cataract extraction    BPH (benign prostatic hyperplasia).  Not on therapy      DISCUSSION:  Atrial fibrillation was suspected but was not proven until he developed PAF with RVR at 5 AM.  Rate is now controlled and since Eliquis was already started yesterday patient was begun on Multaq 400 mg twice daily per cardiology.  Beta-blocker therapy is to be deferred unless at follow-up he has a rapid ventricular response or hypertension at which point nebivolol 5 mg daily is to be started.  It was felt the patient could be discharged home to follow-up in the [facility] atrial fibrillation clinic next week and if he is back in normal sinus rhythm they are to place a 2-week event monitor patch.  Patient was to be discharged home but apparently the wife was still concerned regarding some intermittent word finding difficulties and neurology has asked that we watch him again overnight.    PLAN     Watch another night in the hospital on Multaq 400 mg every 12, Eliquis 5 mg every 12, and amlodipine 5 mg/valsartan 160 mg (Exforge 5-160)  Continue on apixaban 5 mg every 12 hours and aspirin 81 mg daily  Home tomorrow if does well  Follow-up in atrial fibrillation clinic and decide on whether or not a 2-week monitor is necessary and whether or not he needs a beta-blocker started (for increased heart rate, or hypertension)  Needs follow-up in sleep lab to rule out SATYA  Watch tonight on room air and if requires oxygen will need to go home on oxygen as well until sleep study is done    Plan of care and goals reviewed with multidisciplinary team at daily rounds.    I discussed the patient's findings and my recommendations with patient, family, nursing staff and consulting provider    Time spent Critical care 25 min (It does not include procedure time).    Electronically signed by Lionel Santos MD, 11/11/21, 7:14 AM EST.   Pulmonary / Critical care medicine

## 2021-11-11 NOTE — PROGRESS NOTES
Patient is on Apixaban.  Education provided on 11/11/21 verbally and in writing.  Information provided includes effects of medication, drug-drug and drug-food interactions, and signs/symptoms of bleeding and clotting.  Patient verbalized understanding through teach back.  All pertinent questions were answered.      Cinthya Perez  Pharmacy Student  11/11/2021  10:32 EST

## 2021-11-11 NOTE — PLAN OF CARE
Goal Outcome Evaluation:  Plan of Care Reviewed With: patient        Progress: no change  Outcome Summary: NIH a 2 at start of shift due to patient demonstrating word-blocking/aphasia and some dysarthria. This has been present and more prominent today and could be stress/anxiety related. VSS and afebrile tonight. Around 0500 patient appeared to have rhythm change on monitor suggestive of atrial fibrillation/flutter. EKG obtained which did confirm this. Patient denies symptoms. Notified Neuro and Intensivist teams, able to get rate control with valsalva and breathing techniques. Had started Eliquis yesterday for suspected a-fib so no new orders received as long as patient remains rate-controlled. Neurologically intact and denies any new onset of symptoms. NIH a 1 during this episode. Potentially discharge home today with cardiac monitor per Cardiology note. Updated patient and verbalizes understanding.

## 2021-11-11 NOTE — PROGRESS NOTES
Neurology       Patient Care Team:  Lauren Shah MD as PCP - General (Internal Medicine)    Chief complaint: Aphasia    History: The patient went into atrial fib last night and is now being started on Multaq 400 mg twice daily by Dr. Ratliff.    The is still has some mild to moderate word blocking but is having no receptive speech problems.    He has no weakness.    He is in good spirits  Past Medical History:   Diagnosis Date   • Glaucoma        Vital Signs   Vitals:    11/11/21 0800 11/11/21 0900 11/11/21 0906 11/11/21 1000   BP: 135/83 129/72 129/72 110/63   BP Location:       Patient Position:       Pulse: 76 102 87 86   Resp: 18      Temp: 98 °F (36.7 °C)      TempSrc:       SpO2: 97% 96%  97%   Weight:       Height:           Physical Exam:   General: Pleasant alert.  Irregularly irregular rhythm.  Heart rate between 70 and 80              Neuro: Awake and alert.    Oriented to person place and time.    Speech shows mild blocking and word substitution.    He is relatively fluent however.    He moves all extremities well.        Results Review:  Patient is now in atrial fib  Results from last 7 days   Lab Units 11/10/21  0442   WBC 10*3/mm3 8.08   HEMOGLOBIN g/dL 14.5   HEMATOCRIT % 42.9   PLATELETS 10*3/mm3 181     Results from last 7 days   Lab Units 11/11/21  0818 11/10/21  0442 11/08/21  1232   SODIUM mmol/L 139 140 137   POTASSIUM mmol/L 4.1 4.3 4.6   CHLORIDE mmol/L 104 106 100   CO2 mmol/L 24.0 26.0 22.0   BUN mg/dL 14 15 17   CREATININE mg/dL 0.94 1.05 1.05   CALCIUM mg/dL 8.8 8.8 9.0   BILIRUBIN mg/dL  --   --  0.7   ALK PHOS U/L  --   --  79   ALT (SGPT) U/L  --   --  19  19   AST (SGOT) U/L  --   --  26  23   GLUCOSE mg/dL 104* 91 94       Imaging Results (Last 24 Hours)     ** No results found for the last 24 hours. **          Assessment:  Acute embolic strokes secondary to new onset atrial fib    Plan:  Okay to discharge with outpatient speech therapy.    Cardiology follow-up in the A. fib  clinic per Dr. Ratliff    Comment:  Good prognosis for further improvement         I discussed the patients findings and my recommendations with patient, family and nursing staff    Matias Cherry MD  11/11/21  11:52 EST

## 2021-11-11 NOTE — PROGRESS NOTES
"Clinical Nutrition Note      Patient Name: Torsten Jackson  MRN: [id number]  Admission date: 11/8/2021      Multidisciplinary Rounds    Additional information obtained during MDR:  RN reports pt has A fib overnight , cardiology started him on Multaq , he is okay for dc home and follow-up in a fib clinic, okay w/ neurology for dc w/ OP therapy as well.      Current diet: Diet Regular; Cardiac    Oral Nutrition Supplement:     Pertinent medical data reviewed:  No nutrition risk identified on nursing screen; MST score \"0\"    Average oral intake per documentation:              Intervention:  Plan of care and goals of care reviewed    Monitor:  RD to follow per protocol      Roseline Alvarez MS,RD,LD  11/11/21 14:40 EST  Time: 15  mins       "

## 2021-11-12 VITALS
TEMPERATURE: 97.9 F | BODY MASS INDEX: 22.95 KG/M2 | OXYGEN SATURATION: 97 % | WEIGHT: 178.79 LBS | RESPIRATION RATE: 16 BRPM | SYSTOLIC BLOOD PRESSURE: 122 MMHG | HEART RATE: 54 BPM | DIASTOLIC BLOOD PRESSURE: 71 MMHG | HEIGHT: 74 IN

## 2021-11-12 PROCEDURE — 99239 HOSP IP/OBS DSCHRG MGMT >30: CPT | Performed by: NURSE PRACTITIONER

## 2021-11-12 PROCEDURE — 99232 SBSQ HOSP IP/OBS MODERATE 35: CPT | Performed by: INTERNAL MEDICINE

## 2021-11-12 PROCEDURE — 99232 SBSQ HOSP IP/OBS MODERATE 35: CPT | Performed by: PSYCHIATRY & NEUROLOGY

## 2021-11-12 RX ORDER — AMIODARONE HYDROCHLORIDE 200 MG/1
200 TABLET ORAL 2 TIMES DAILY
Qty: 28 TABLET | Refills: 0 | Status: SHIPPED | OUTPATIENT
Start: 2021-11-12 | End: 2021-11-26

## 2021-11-12 RX ORDER — ATORVASTATIN CALCIUM 40 MG/1
40 TABLET, FILM COATED ORAL NIGHTLY
Qty: 60 TABLET | Refills: 2 | Status: SHIPPED | OUTPATIENT
Start: 2021-11-12 | End: 2021-12-07 | Stop reason: SDUPTHER

## 2021-11-12 RX ORDER — FAMOTIDINE 20 MG/1
20 TABLET, FILM COATED ORAL 2 TIMES DAILY
Qty: 60 TABLET | Refills: 2 | Status: SHIPPED | OUTPATIENT
Start: 2021-11-12 | End: 2021-12-07

## 2021-11-12 RX ORDER — AMIODARONE HYDROCHLORIDE 200 MG/1
200 TABLET ORAL DAILY
Qty: 30 TABLET | Refills: 5 | Status: SHIPPED | OUTPATIENT
Start: 2021-11-27 | End: 2021-12-07 | Stop reason: SDUPTHER

## 2021-11-12 RX ORDER — ASPIRIN 81 MG/1
81 TABLET ORAL DAILY
Qty: 60 TABLET | Refills: 2 | Status: SHIPPED | OUTPATIENT
Start: 2021-11-13

## 2021-11-12 RX ADMIN — ASPIRIN 81 MG: 81 TABLET, COATED ORAL at 08:17

## 2021-11-12 RX ADMIN — DRONEDARONE 400 MG: 400 TABLET, FILM COATED ORAL at 08:22

## 2021-11-12 RX ADMIN — AMLODIPINE BESYLATE: 5 TABLET ORAL at 08:17

## 2021-11-12 RX ADMIN — SODIUM CHLORIDE 500 ML: 9 INJECTION, SOLUTION INTRAVENOUS at 10:37

## 2021-11-12 RX ADMIN — APIXABAN 5 MG: 5 TABLET, FILM COATED ORAL at 08:17

## 2021-11-12 RX ADMIN — FAMOTIDINE 20 MG: 20 TABLET, FILM COATED ORAL at 08:17

## 2021-11-12 NOTE — PLAN OF CARE
We called the insurance company and they are refusing to cover Multaq (even with a prior authorization) and would cover amiodarone instead. We will begin amiodarone 200mg bid x 2 weeks, then the patient will start amiodarone 200mg daily and he will follow up in the heart/valve clinic in 2 weeks.     Electronically signed by PORTER Rae, 11/12/21, 2:36 PM EST.

## 2021-11-12 NOTE — PLAN OF CARE
RN is concerned about increased dysarthria after treatment of hypertension.  He is normotensive on amlodipine/valsartan today but will discontinue and observe blood pressure.  He can have this monitored when he follows up as an outpatient; additionally and hopefully he has evaluation and treatment of probable obstructive sleep apnea and this should improve his blood pressure readings as well.  He does have LVH on echocardiogram that would benefit from ARB therapy but will defer at this time and discontinue treatment.

## 2021-11-12 NOTE — PROGRESS NOTES
"Torsten Jackson  [id number]  1945   LOS: 4 days   Patient Care Team:  Lauren Shah MD as PCP - General (Internal Medicine)    Chief Complaint:  EMBOLIC CVA / ATRIAL FLUTTER / HTN / PROBABLE SATYA    Subjective     Comfortable flat in bed off nasal oxygen therapy.  Continues to have intermittent mild to moderate expressive aphasia.  He states overall he is \"better today.\"  Denies anginal type chest discomfort, increased tachypnea/dyspnea, nausea, emesis, palpitation, progressive focal weakness, headache, or abdominal pain.  Appetite excellent.  No additional complaints.    Objective     Vital Sign Min/Max for last 24 hours  Temp  Min: 97.6 °F (36.4 °C)  Max: 98 °F (36.7 °C)   BP  Min: 100/72  Max: 136/81   Pulse  Min: 53  Max: 103   Resp  Min: 16  Max: 20   SpO2  Min: 90 %  Max: 98 %      Weight  Min: 81.1 kg (178 lb 12.7 oz)  Max: 81.1 kg (178 lb 12.7 oz)         11/09/21  1135 11/12/21  0600   Weight: 88.9 kg (196 lb) 81.1 kg (178 lb 12.7 oz)         Intake/Output Summary (Last 24 hours) at 11/12/2021 0727  Last data filed at 11/11/2021 1800  Gross per 24 hour   Intake 1080 ml   Output --   Net 1080 ml       Physical Exam:     General Appearance:    Alert, cooperative, in no acute distress   Lungs:     Clear to auscultation,respirations regular, even and                   unlabored    Heart:    Irregular and normal rate, variable S1 and S2, no            murmur, no gallop, no rub, no click   Abdomen:  Extremities:   Soft, non-tender, bowel sounds audible x4    No edema, normal range of motion   Pulses:   Pulses palpable and equal bilaterally      Results Review:   Results from last 7 days   Lab Units 11/11/21  0818 11/10/21  0442 11/10/21  0442 11/08/21  1232 11/08/21  1232   SODIUM mmol/L 139  --  140  --  137   POTASSIUM mmol/L 4.1  --  4.3  --  4.6   CHLORIDE mmol/L 104  --  106  --  100   CO2 mmol/L 24.0  --  26.0  --  22.0   BUN mg/dL 14  --  15  --  17   CREATININE mg/dL 0.94  --  1.05  --  1.05 "   GLUCOSE mg/dL 104*   < > 91   < > 94   CALCIUM mg/dL 8.8  --  8.8  --  9.0    < > = values in this interval not displayed.     Results from last 7 days   Lab Units 11/10/21  0442 11/08/21  1232   WBC 10*3/mm3 8.08 7.48   HEMOGLOBIN g/dL 14.5 14.7   HEMATOCRIT % 42.9 44.5   PLATELETS 10*3/mm3 181 201     Results from last 7 days   Lab Units 11/09/21  0600   CHOLESTEROL mg/dL 158   TRIGLYCERIDES mg/dL 171*   HDL CHOL mg/dL 30*   LDL CHOL mg/dL 98     Results from last 7 days   Lab Units 11/09/21  0600   HEMOGLOBIN A1C % 5.20     Results from last 7 days   Lab Units 11/08/21  1232   TROPONIN T ng/mL <0.010     · NO NEW CXR / EKG / LAB RESULTS.    Medication Review: REVIEWED; NO DRIPS.    Assessment/Plan     Improved blood pressure control with continued now atrial fibrillation with rate well controlled and asymptomatic.  He will need follow-up with [facility] next week with electrocardiogram.  If he has persistent afibrillation and is maintained on apixaban would tentatively pursue ECV during the week of 22 November 2021.  Would otherwise allow home on discharge medications and follow-up as recommended in previous notes.  Thanks.      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  Not on therapy    Intracranial cerebrovascular disease in bilateral MCA territories    Hypertension diagnosed this admission    Nocturnal oxygen desaturation this admission.  Rule out SATYA    PAF with rapid ventricular response. New diagnosis 11/11/2021 (HCC)    11/12/21  07:27 EST

## 2021-11-12 NOTE — PROGRESS NOTES
Neurology       Patient Care Team:  Lauren Shah MD as PCP - General (Internal Medicine)    Chief complaint: Stroke    History: Patient had a sinking spell this morning with a drop in his blood pressure worsening of his speech.  He was on Multaq and Exforge.    His Exforge has been discontinued and he is back to baseline talking fluently with no problems.    Being discharged with follow-up with Dr. Ratliff in 2 weeks.      Past Medical History:   Diagnosis Date   • Glaucoma        Vital Signs   Vitals:    11/12/21 0900 11/12/21 1000 11/12/21 1100 11/12/21 1300   BP: 107/72 128/74 106/72 122/71   BP Location:  Right arm  Right arm   Patient Position:  Sitting  Sitting   Pulse: 75 75 63 54   Resp:  16  16   Temp:       TempSrc:       SpO2: 94% 96% 97% 97%   Weight:       Height:           Physical Exam:   General: Pleasant and alert              Neuro: Oriented to person place and time.    Speech is articulate with no word finding problems.    Is all extremities well.        Results Review:  Reviewed  Results from last 7 days   Lab Units 11/10/21  0442   WBC 10*3/mm3 8.08   HEMOGLOBIN g/dL 14.5   HEMATOCRIT % 42.9   PLATELETS 10*3/mm3 181     Results from last 7 days   Lab Units 11/11/21  0818 11/10/21  0442 11/08/21  1232   SODIUM mmol/L 139 140 137   POTASSIUM mmol/L 4.1 4.3 4.6   CHLORIDE mmol/L 104 106 100   CO2 mmol/L 24.0 26.0 22.0   BUN mg/dL 14 15 17   CREATININE mg/dL 0.94 1.05 1.05   CALCIUM mg/dL 8.8 8.8 9.0   BILIRUBIN mg/dL  --   --  0.7   ALK PHOS U/L  --   --  79   ALT (SGPT) U/L  --   --  19  19   AST (SGOT) U/L  --   --  26  23   GLUCOSE mg/dL 104* 91 94       Imaging Results (Last 24 Hours)     ** No results found for the last 24 hours. **          Assessment:  Multiple embolic strokes secondary to new onset atrial fibrillation    Plan:  The patient follow-up with [facility].    Continue Eliquis aspirin Lipitor    Comment:  Need to be treating his blood pressure a little more gently in the  future.         I discussed the patients findings and my recommendations with patient, family, nursing staff and primary care team    Matias Cherry MD  11/12/21  15:43 EST

## 2021-11-12 NOTE — PROGRESS NOTES
"Clinical Nutrition Note      Patient Name: Torsten Jackson  MRN: [id number]  Admission date: 11/8/2021      Multidisciplinary Rounds    Additional information obtained during MDR:  RN reports pt begins to have \"word salad\" /aphasia after his BP decreases w/ new medications. MD ordered 500 ml saline bolus and cardiology to determine if medications should continue.  Anticipate dc home today or tomorrow.      Current diet: Diet Regular; Cardiac    Oral Nutrition Supplement:     Pertinent medical data reviewed:  No nutrition risk identified on nursing screen; MST score \"0\"    Average oral intake per documentation:    ~ 90 % of 7 meals         Intervention:  Plan of care and goals of care reviewed    Monitor:  RD to follow per protocol      Roseline Alvarez MS,RD,LD  11/12/21 13:45 EST  Time: 15  mins       "

## 2021-11-12 NOTE — CASE MANAGEMENT/SOCIAL WORK
Continued Stay Note  [facility]     Patient Name: Torsten Jackson  MRN: [id number]  Today's Date: 11/12/2021    Admit Date: 11/8/2021     Discharge Plan     Row Name 11/12/21 1046       Plan    Plan Home w/Outpatient SLP    Patient/Family in Agreement with Plan yes    Plan Comments Patient's plan is still to return home with outpatient SLP.  Referral previously placed i [facility].  They will call patient to schedule appointment.  No other needs noted.  CM will continue to follow.  Mya Madsen RN [phone]    Final Discharge Disposition Code 01 - home or self-care               Discharge Codes    No documentation.               Expected Discharge Date and Time     Expected Discharge Date Expected Discharge Time    Nov 12, 2021             Mya Madsen RN

## 2021-11-12 NOTE — DISCHARGE SUMMARY
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  Not on therapy    Intracranial cerebrovascular disease in bilateral MCA territories    Hypertension diagnosed this admission    Nocturnal oxygen desaturation this admission.  Rule out SATYA    PAF with rapid ventricular response. New diagnosis 2021 (HCC)    Procedures:  11/10/21: Transesophageal echocardiogram      Imaging:  CT Head Without Contrast    Result Date: 2021  Recently noted areas of small acute infarct in the right frontal lobe and left temporal lobe are not well appreciated on CT. There is no distinct evidence of acute territorial ischemia. There is no intracranial hemorrhage, mass or mass effect.   This report was finalized on 2021 3:24 PM by Humphrey Lopes.      CT Head Without Contrast    Result Date: 2021  No interval change or acute findings specifically, no intracranial hemorrhage. Status post TPA administration. No midline shift or hydrocephalus.  D:  2021 E:  2021    This report was finalized on 2021 8:45 PM by Dr. Terry Summers.      CT Angiogram Neck    Result Date: 2021  Although no large vessel occlusion, middle cerebral arteries demonstrate irregularities throughout the bilateral MCA territories, right M2 and M3 segments, moderate-to-severe stenoses and irregularity along with area of severe stenosis and near total occlusion left M3 segment, for example series  9- image 59.   D:  11/08/2021 E:  11/08/2021  This report was finalized on 11/8/2021 8:44 PM by Dr. Terry Summers.      MRI Brain Without Contrast    Result Date: 11/9/2021  Multifocal small areas of acute infarct involving the right frontal lobe and left posterior temporal lobe, favoring central embolic source. There is no associated hemorrhage or mass effect. An area of susceptibility artifact noted within the posterior aspect of the left sylvian fissure likely represents some persisting left distal M2 thrombus.  Mild typical age-related changes are otherwise present as above. There is no evidence of mass, mass effect or acute hemorrhage.  This report was finalized on 11/9/2021 9:08 AM by Humphrey Lopes.      XR Chest 1 View    Result Date: 11/8/2021  No acute cardiopulmonary process.  D: 11/08/2021 E: 11/08/2021  This report was finalized on 11/8/2021 8:45 PM by Dr. Terry Summers.      CT Head Without Contrast Stroke Protocol    Result Date: 11/8/2021  1. No acute intracranial finding specifically no acute intracranial hemorrhage. 2. Sinonasal mucosal disease centered within the right maxillary sinus and ethmoid air cells concerning for sinusitis or ethmoiditis.  NOTE: Scan performed on 11/08/2021 at 1238 hours. Scan report given to treatment team in person at scanner by Dr. Summers on 11/08/2021 at 1244 hours.  D:  11/08/2021 E:  11/08/2021   This report was finalized on 11/8/2021 8:44 PM by Dr. Terry Summers.      CT Angiogram Head w AI Analysis of LVO    Result Date: 11/8/2021  Although no large vessel occlusion, middle cerebral arteries demonstrate irregularities throughout the bilateral MCA territories, right M2 and M3 segments, moderate-to-severe stenoses and irregularity along with area of severe stenosis and near total occlusion left M3 segment, for example series 9- image 59.   D:  11/08/2021 E:  11/08/2021  This report was finalized on 11/8/2021 8:44 PM by Dr. Terry Summers.      CT CEREBRAL PERFUSION WITH &  WITHOUT CONTRAST    Result Date: 11/8/2021  Abnormal perfusion with small area of left posterior MCA distribution frontoparietal region reversible ischemia.  D:  11/08/2021 E:  11/08/2021    This report was finalized on 11/8/2021 8:44 PM by Dr. Terry Summers.      History of Present Illness (obtained via H&P note on 11/8/21):    76-year-old  white male lifelong non-smoker with no prior medical problems except for BPH and glaucoma.  Patient indicates that he was well at 11:30 AM and was getting ready to go to the gym.  Subsequently developed transient dysarthria and expressive aphasia/word salad (wife indicates that she had difficulty understanding him).  Also apparently noted some mild tingling or numbness of the left upper extremity.  Came directly to [facility] ER and by the time of his arrival his symptoms had resolved and NIH was calculated of 0.  Blood pressure was noted however to be 196/86 (he does not have underlying hypertension).  At the time he arrived he had no unilateral weakness, sensory loss, diplopia or other visual abnormalities, loss of balance, and denied headache, nausea, vomiting.     Neurology evaluation in the ER found no focal abnormalities or dysarthria at the time of their exam.  CT the head revealed no acute intracranial findings or evidence of ICH.  CTA revealed irregularities throughout bilateral MCA territories, right M2 and M3 segments, and moderate to severe stenosis and irregularity with near-total occlusion of left M3.  CT perfusion was subsequently obtained revealing a small area of abnormal perfusion/reversible ischemia in the frontoparietal region in the left posterior MCA distribution.  He was an appropriate candidate for TPA and infusion begun at 1340. He was admitted to ICU for further management.     Hospital Course:  Patient was admitted to ICU 2* issues discussed in the above HPI and continued on CVA s/p tPA protocol with frequent neurologic checks. By the following morning  he had complete resolution of neurologic symptoms with return to baseline. Further comprehensive workup was completed including MRI of the brain revealing multifocal small areas of acute infarct involving the right frontal lobe and left posterior temporal lobe suggesting central embolic source, TTE which was negative for right to left atrial shunt with preserved LVEF of 60%, and 24 hour post tPA CTH negative for hemorrhage and patient was initiated on Eliquis and ASA per Neurology. Cardiology was consulted for embolic concerns and patient underwent TCD with negative bubble study as well as MARISELA per Dr. Grossman on 11/10 which was negative for cardiac source of emboli. He was placed on Multaq and then Exforge added per Dr. Ratliff and plan was actually to discharge patient with cardiac monitoring patch on 11/11, however after patient received dose of Exforge he experienced drop in blood pressure and became symptomatic with slurred speech / decision was made to keep him an additional night for observation.     Overnight patient developed atrial fibrillation which was rate controlled and patient was asymptomatic with it. He did, however, continue to be symptomatic with transient blood pressure drop following Exforge and it was ultimately discontinued by Cardiology. Plan was to continue with Multaq, however insurance refused to cover this (despite prior authorization) and he was instead transitioned to PO Amiodarone 200 mg daily. He will continue on Eliquis 5 mg BID and low-dose ASA. He is to follow up in the heart/valve clinic in 2 weeks, with Dr. Ratliff in 4-6 weeks, and Neurology in 4 weeks.      Patient is felt to have reached maximum benefit from this hospitalization and has been deemed appropriate for discharge home. He is feeling well, is tolerating a PO diet, and is ambulating up to 400 feet independently. He has worked with both PT and OT who felt he has no need for continued therapy following discharge. He has  "worked with SLP as well who recommended continued outpatient therapy - a referral has been made to [facility] outpatient SLP at [facility]. Also of note, due to intermittent episodes of nocturnal hypoxia / concerns for possible SATYA during this hospitalization a referral has been made to the sleep clinic.    Vitals:  /71 (BP Location: Right arm, Patient Position: Sitting)   Pulse 54   Temp 97.9 °F (36.6 °C) (Oral)   Resp 16   Ht 188 cm (74\")   Wt 81.1 kg (178 lb 12.7 oz)   SpO2 97%   BMI 22.96 kg/m²     Physical Exam:  Constitutional:  Appears well-developed and well-nourished. No distress.   HEENT:  Normocephalic and atraumatic. PER  Neck: Normal range of motion. Neck supple. No JVD present.   Cardiovascular: Normal rate and irregular rhythm. Atrial fibrillation noted on the monitor. No gallop and no friction rub. Intact distal pulses.    Pulmonary/Chest: Effort normal and breath sounds normal. No respiratory distress. No wheezes, rhonchi or rales.   Abdominal: Soft. No distension and no mass. There is no tenderness.   Musculoskeletal: Normal range of motion.   Neurological: Alert and oriented to person, place, and time.  No focal deficits  Skin: Skin is warm and dry. No rash noted.   Extremities:  No clubbing, edema or cyanosis  Psychiatric: Normal mood and affect. Behavior is normal.     Interval:  (shift change)  1a. Level of Consciousness: 0-->Alert, keenly responsive  1b. LOC Questions: 0-->Answers both questions correctly  1c. LOC Commands: 0-->Performs both tasks correctly  2. Best Gaze: 0-->Normal  3. Visual: 0-->No visual loss  4. Facial Palsy: 0-->Normal symmetrical movements  5a. Motor Arm, Left: 0-->No drift, limb holds 90 (or 45) degrees for full 10 secs  5b. Motor Arm, Right: 0-->No drift, limb holds 90 (or 45) degrees for full 10 secs  6a. Motor Leg, Left: 0-->No drift, leg holds 30 degree position for full 5 secs  6b. Motor Leg, Right: 0-->No drift, leg holds 30 degree position for full 5 " secs  7. Limb Ataxia: 0-->Absent  8. Sensory: 0-->Normal, no sensory loss  9. Best Language: 1-->Mild-to-moderate aphasia, some obvious loss of fluency or facility of comprehension, without significant limitation on ideas expressed or form of expression. Reduction of speech and/or comprehension, however, makes conversation. . . (see row details)  10. Dysarthria: 0-->Normal  11. Extinction and Inattention (formerly Neglect): 0-->No abnormality    Total (NIH Stroke Scale): 1    Labs:  Results from last 7 days   Lab Units 11/10/21  0442   WBC 10*3/mm3 8.08   HEMOGLOBIN g/dL 14.5   HEMATOCRIT % 42.9   PLATELETS 10*3/mm3 181     Results from last 7 days   Lab Units 11/11/21 0818 11/10/21  0442 11/08/21  1232   SODIUM mmol/L 139   < > 137   POTASSIUM mmol/L 4.1   < > 4.6   CHLORIDE mmol/L 104   < > 100   CO2 mmol/L 24.0   < > 22.0   BUN mg/dL 14   < > 17   CREATININE mg/dL 0.94   < > 1.05   CALCIUM mg/dL 8.8   < > 9.0   BILIRUBIN mg/dL  --   --  0.7   ALK PHOS U/L  --   --  79   ALT (SGPT) U/L  --   --  19  19   AST (SGOT) U/L  --   --  26  23   GLUCOSE mg/dL 104*   < > 94    < > = values in this interval not displayed.         No results found for: MG, PHOS        Discharge Medications:     Discharge Medications      New Medications      Instructions Start Date   Adult Aspirin Regimen 81 MG EC tablet  Generic drug: aspirin   81 mg, Oral, Daily   Start Date: November 13, 2021     amiodarone 200 MG tablet  Commonly known as: PACERONE   200 mg, Oral, 2 Times Daily      amiodarone 200 MG tablet  Commonly known as: PACERONE   200 mg, Oral, Daily   Start Date: November 27, 2021     atorvastatin 40 MG tablet  Commonly known as: LIPITOR   40 mg, Oral, Nightly      Eliquis 5 MG tablet tablet  Generic drug: apixaban   Take 1 tablet by mouth Every 12 (Twelve) Hours.      famotidine 20 MG tablet  Commonly known as: PEPCID   20 mg, Oral, 2 Times Daily      PHARMACY MEDS TO BED CONSULT   Does not apply, Daily         Continue These  Medications      Instructions Start Date   multivitamin tablet tablet   1 tablet, Oral, Daily           Discharge Diet:   Diet Instructions     Diet: Regular, Cardiac; Thin      Discharge Diet:  Regular  Cardiac       Fluid Consistency: Thin          Activity at Discharge:    Activity Instructions     Activity as Tolerated            Follow-up Appointments  Future Appointments   Date Time Provider Department Center   11/19/2021  1:30 PM Laury Aguilera APRN [facility]   1/19/2022  2:00 PM Jeroem Rubio MD [facility]     Additional Instructions for the Follow-ups that You Need to Schedule     Ambulatory Referral to [facility] [place] [facility] [place]   As directed      Follow up in one week with EKG    Order Comments: Follow up in one week with EKG     Service Requested: Arrhythmia Clinic         Ambulatory Referral to Sleep Lab   As directed      Follow up in one week or soonest available appointment    Order Comments: Follow up in one week or soonest available appointment          Ambulatory Referral to Speech Therapy   As directed      Evaluate and treat S/P CVA    Order Comments: Evaluate and treat S/P CVA          Discharge Follow-up with PCP   As directed       Currently Documented PCP:    Lauren Shah MD    PCP Phone Number:    [phone]     Follow Up Details: 1 week         Discharge Follow-up with Specialty: Heart and valve clinic; 2 Weeks   As directed      Specialty: Heart and valve clinic    Follow Up: 2 Weeks         Discharge Follow-up with Specialty: Neurology Stroke   As directed      Specialty: Neurology Stroke    Follow Up Details: 4-6 weeks         Discharge Follow-up with Specified Provider: Dr. Ratliff   As directed      To: Dr. Ratliff    Follow Up Details: 4-6 weeks             Discharge Instructions:  Ok to D/C home  F/U as above  Scripts provided via Meds to Bed service  Outpatient referral for SLP  Outpatient referral to Sleep Clinic     Current  Code Status     Date Active Code Status Order ID Comments User Context       11/9/2021 1129 CPR (Attempt to Resuscitate) [id number]  Lionel Santos MD Inpatient     Advance Care Planning Activity     Myra Posadas DNP, APRN, [facility]  Pulmonary and Critical Care Medicine  11/12/21     Time: Discharge 45 min    CC: Lauren Shah MD    *Please note that portions of this note were completed with a voice recognition program. Efforts were made to edit the dictations, but occasionally words are mistranscribed.

## 2021-11-12 NOTE — PLAN OF CARE
If the patient is still in atrial fibrillation in 2 weeks, we will petition the insurance company to switch from amiodarone to Multaq, as we feel that this is a more appropriate choice for him.

## 2021-11-12 NOTE — PLAN OF CARE
Goal Outcome Evaluation:  Pt A&Ox4, VSS, O2 >90% on RA. Pt SBP <130. Pt neuro status remains intact. NIH 1. Pt does have some intermittent aphasia. Pt remains in afib/a flutter. Pt up to bathroom multiple times. Pt questions answered and reassurance provided.

## 2021-11-13 ENCOUNTER — READMISSION MANAGEMENT (OUTPATIENT)
Dept: CALL CENTER | Facility: HOSPITAL | Age: 76
End: 2021-11-13

## 2021-11-13 NOTE — OUTREACH NOTE
Prep Survey      Responses   [facility]ational facility patient discharged from? [place]   Is LACE score < 7 ? No   Emergency Room discharge w/ pulse ox? 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 Stroke (TIA)   Does the patient have Home health ordered? No   Is there a DME ordered? No   Comments regarding appointments Appts needed   Medication alerts for this patient Pacerone, Eliquis, ASA, Lipitor   Prep survey completed? Yes          Concepción Carrillo RN

## 2021-11-16 ENCOUNTER — READMISSION MANAGEMENT (OUTPATIENT)
Dept: CALL CENTER | Facility: HOSPITAL | Age: 76
End: 2021-11-16

## 2021-11-16 ENCOUNTER — HOSPITAL ENCOUNTER (OUTPATIENT)
Dept: CARDIOLOGY | Facility: HOSPITAL | Age: 76
Discharge: HOME | End: 2021-11-16

## 2021-11-16 ENCOUNTER — OFFICE VISIT (OUTPATIENT)
Dept: CARDIOLOGY | Facility: HOSPITAL | Age: 76
End: 2021-11-16

## 2021-11-16 VITALS
TEMPERATURE: 97.4 F | DIASTOLIC BLOOD PRESSURE: 87 MMHG | HEART RATE: 57 BPM | WEIGHT: 196 LBS | HEIGHT: 74 IN | BODY MASS INDEX: 25.15 KG/M2 | OXYGEN SATURATION: 99 % | RESPIRATION RATE: 16 BRPM | SYSTOLIC BLOOD PRESSURE: 174 MMHG

## 2021-11-16 DIAGNOSIS — I10 ESSENTIAL HYPERTENSION: ICD-10-CM

## 2021-11-16 DIAGNOSIS — I48.0 PAROXYSMAL ATRIAL FIBRILLATION WITH RAPID VENTRICULAR RESPONSE (HCC): Primary | ICD-10-CM

## 2021-11-16 DIAGNOSIS — I63.9 ACUTE CVA (CEREBROVASCULAR ACCIDENT) (HCC): ICD-10-CM

## 2021-11-16 DIAGNOSIS — I48.0 PAROXYSMAL ATRIAL FIBRILLATION WITH RAPID VENTRICULAR RESPONSE (HCC): ICD-10-CM

## 2021-11-16 LAB
QT INTERVAL: 452 MS
QTC INTERVAL: 403 MS

## 2021-11-16 PROCEDURE — 99214 OFFICE O/P EST MOD 30 MIN: CPT | Performed by: NURSE PRACTITIONER

## 2021-11-16 PROCEDURE — 93005 ELECTROCARDIOGRAM TRACING: CPT | Performed by: NURSE PRACTITIONER

## 2021-11-16 PROCEDURE — 93010 ELECTROCARDIOGRAM REPORT: CPT | Performed by: INTERNAL MEDICINE

## 2021-11-16 RX ORDER — VALSARTAN 80 MG/1
80 TABLET ORAL DAILY
Qty: 90 TABLET | Refills: 3 | Status: SHIPPED | OUTPATIENT
Start: 2021-11-16 | End: 2021-12-07 | Stop reason: SDUPTHER

## 2021-11-16 NOTE — OUTREACH NOTE
Stroke Week 1 Survey      Responses   [facility] patient discharged from? [place]   Does the patient have one of the following disease processes/diagnoses(primary or secondary)? Stroke (TIA)   Week 1 attempt successful? 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 Yes   List who call center can speak with Lucía, spouse   Person spoke with today (if not patient) and relationship Lucía, spouse   Meds reviewed with patient/caregiver? Yes   Does the patient have all medications ordered at discharge? Yes   Is the patient taking all medications as directed (includes completed medication regime)? Yes   Does the patient have a primary care provider?  Yes   Does the patient have an appointment with their PCP within 7 days of discharge? Yes   Comments regarding PCP Lauren De Guzman MD PCP   Has the patient kept scheduled appointments due by today? Yes  [Patient seen by cardiology today. ]   Comments Uri Agudelo cardiology APRN 11/23/21, Dr Rubio 1/19/22   Has home health visited the patient within 72 hours of discharge? N/A   Psychosocial issues? No   Does the patient require any assistance with activities of daily living such as eating, bathing, dressing, walking, etc.? No   Does the patient have any residual symptoms from stroke/TIA? No   Does the patient understand the diet ordered at discharge? Yes   Did the patient receive a copy of their discharge instructions? Yes   Nursing interventions Reviewed instructions with patient  [spouse]   What is the patient's perception of their health status since discharge? Improving   Nursing interventions Nurse provided patient education   Is the patient able to teach back FAST for Stroke? Yes   Is the patient/caregiver able to teach back the risk factors for a stroke? High blood pressure-goal below 120/80,  History of Afib,  Sleep  apnea  [Wife reports that they will have PCP arrange sleep study to check for sleep apnea. ]   Is the patient/caregiver able to teach back signs and symptoms related to disease process for when to call PCP? Yes   Is the patient/caregiver able to teach back signs and symptoms related to disease process for when to call 911? Yes   If the patient is a current smoker, are they able to teach back resources for cessation? Not a smoker   Is the patient/caregiver able to teach back the hierarchy of who to call/visit for symptoms/problems? PCP, Specialist, Home health nurse, Urgent Care, ED, 911 Yes   Week 1 call completed? Yes          Sofia Dai RN

## 2021-11-16 NOTE — PROGRESS NOTES
Chief Complaint  Establish Care, Stroke, Hypertension, and Atrial Fibrillation    Subjective    History of Present Illness {CC  Problem List  Visit  Diagnosis   Encounters  Notes  Medications  Labs  Result Review Imaging  Media :23}     Torsten Jackson, 76 y.o. male with acute embolic CVA, HTN newly diagnosed PAF, presents to [facility] Heart and Valve clinic for Establish Care, Stroke, Hypertension, and Atrial Fibrillation.    Patient as recently evaluated at [facility] after developing transient dysarthria and expressive aphasia/word salad (wife indicates that she had difficulty understanding him); he also noted some mild tingling or numbness of the left upper extremity. Comprehensive workup was completed including MRI of the brain revealing multifocal small areas of acute infarct involving the right frontal lobe and left posterior temporal lobe suggesting central embolic source. TTE negative for right to left atrial shunt with preserved LVEF of 60%, and 24 hour post tPA CTH negative for hemorrhage and patient was initiated on Eliquis and ASA per Neurology. Cardiology was consulted for embolic concerns and patient underwent TTE with negative bubble study as well as MARISELA per Dr. Grossman on 11/10 which was negative for cardiac source of emboli. patient developed atrial fibrillation which was rate controlled and patient was asymptomatic with it. He did continue to be symptomatic with transient blood pressure drop and antihypertensives discontinued by cardiology. Plan was to continue with Multaq, however insurance refused to cover this (despite prior authorization) and he was instead transitioned to PO Amiodarone 200 mg daily. He will continue on Eliquis 5 mg BID and low-dose ASA. He was instructed to follow up in the heart/valve clinic in 2 weeks, with Dr. Ratliff in 4-6 weeks, and Neurology in 4 weeks.      Patient presents today feeling well overall and recovering well from his recent  "hospitalization. His home blood pressure log does indicate SBP range generally 140-170. 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 He denies anginal symptoms, dyspnea, palpitations, racing heart, and syncope. He denies bleeding complications on newly initiated apixaban.       Objective     Vital Signs:   Vitals:    11/16/21 0855 11/16/21 0858 11/16/21 0859   BP: 163/78 156/73 174/87   BP Location: Right arm Left arm Left arm   Patient Position: Sitting Sitting Standing   Pulse: 50 (!) 47 57   Resp: 16     Temp: 97.4 °F (36.3 °C)     TempSrc: Temporal     SpO2: 99%     Weight: 88.9 kg (196 lb)     Height: 188 cm (74\")       Body mass index is 25.16 kg/m².  Physical Exam  Vitals and nursing note reviewed.   Constitutional:       Appearance: Normal appearance.   HENT:      Head: Normocephalic.   Eyes:      Extraocular Movements: Extraocular movements intact.   Neck:      Vascular: No carotid bruit.   Cardiovascular:      Rate and Rhythm: Regular rhythm. Bradycardia present.      Pulses: Normal pulses.      Heart sounds: Normal heart sounds, S1 normal and S2 normal. No murmur heard.      Pulmonary:      Effort: Pulmonary effort is normal. No respiratory distress.      Breath sounds: Normal breath sounds.   Musculoskeletal:      Cervical back: Neck supple.      Right lower leg: No edema.      Left lower leg: No edema.   Skin:     General: Skin is warm and dry.   Neurological:      General: No focal deficit present.      Mental Status: He is alert.   Psychiatric:         Mood and Affect: Mood normal.         Behavior: Behavior normal.         Thought Content: Thought content normal.        Data Reviewed:{ Labs  Result Review  Imaging  Med Tab  Media :23}     Basic Metabolic Panel (11/11/2021 08:18)  TSH (11/11/2021 08:18)  CBC & Differential (11/10/2021 04:42)  Lipid Panel (11/09/2021 06:00)  Adult Transesophageal Echo (MARISELA) W/ Cont if Necessary Per Protocol " (11/10/2021 15:07)  Doppler Transcranial Microbubble Injection CAR (11/09/2021 17:31)  Duplex Carotid Ultrasound CAR (11/09/2021 11:35)  Adult Transthoracic Echo Complete W/ Cont if Necessary Per Protocol (11/09/2021 10:57)  ECG 12 Lead (11/16/2021 09:10)      Assessment and Plan {CC Problem List  Visit Diagnosis  ROS  Review (Popup)  Health Maintenance  Quality  BestPractice  Medications  SmartSets  SnapShot Encounters  Media :23}     1. PAF with rapid ventricular response. New diagnosis 11/11/2021 (HCC)  -Denies arrhythmia symptoms. Previously was asymptomatic with atrial fibrillation during recent hospitalization  -Sinus bradycardia on ECG at time of visit  -[id number]: 5  -Continue apixaban BID as prescribed for anticoagulation  -Continue amiodarone 200mg BID; decrrease to daily dosing on 11/27/21  - ECG 12 Lead; Future  -Discussed sleep medicine referral; patient will consider pursuing referral through [facility] or through PCP.    2. Essential hypertension  -Elevated at time of visit  -Home BP log with SBP range generally 140-170.  -Previously symptomatic with valsartan-amlodipine combination when SBP<110  - valsartan (DIOVAN) 80 MG tablet; Take 1 tablet by mouth Daily.  Dispense: 90 tablet; Refill: 3  -Maintain home BP log with close monitoring  -Follow-up in heart and valve clinic for further antihypertensive titration is BP remains elevated    3. 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 TTE with no evidence of shunt, MARISELA with no evidence of thrombus  -Denies further neurologic deficits  -Continue ASA, atorvastatin as prescribed  -Apixaban as above for anticoagulation  -Continue follow-up with nuerology as scheduled      Follow Up {Instructions Charge Capture  Follow-up Communications :23}     Return in about 1 week (around 11/23/2021) for Office follow-up, BP check.      Patient was given instructions and counseling  regarding his condition or for health maintenance advice. Please see specific information pulled into the AVS if appropriate.  Patient was instructed to call the Heart [facility]ve Center with any questions, concerns, or worsening symptoms.    Dictated Utilizing Dragon Dictation   Please note that portions of this note were completed with a voice recognition program.   Part of this note may be an electronic transcription/translation of spoken language to printed text using the Dragon Dictation System.

## 2021-11-23 ENCOUNTER — READMISSION MANAGEMENT (OUTPATIENT)
Dept: CALL CENTER | Facility: HOSPITAL | Age: 76
End: 2021-11-23

## 2021-11-23 ENCOUNTER — OFFICE VISIT (OUTPATIENT)
Dept: CARDIOLOGY | Facility: HOSPITAL | Age: 76
End: 2021-11-23

## 2021-11-23 VITALS
TEMPERATURE: 97 F | BODY MASS INDEX: 25.35 KG/M2 | RESPIRATION RATE: 16 BRPM | HEART RATE: 48 BPM | HEIGHT: 74 IN | WEIGHT: 197.5 LBS | OXYGEN SATURATION: 98 % | SYSTOLIC BLOOD PRESSURE: 140 MMHG | DIASTOLIC BLOOD PRESSURE: 64 MMHG

## 2021-11-23 DIAGNOSIS — I48.0 PAROXYSMAL ATRIAL FIBRILLATION WITH RAPID VENTRICULAR RESPONSE (HCC): Primary | ICD-10-CM

## 2021-11-23 DIAGNOSIS — I63.9 ACUTE CVA (CEREBROVASCULAR ACCIDENT) (HCC): ICD-10-CM

## 2021-11-23 DIAGNOSIS — I10 ESSENTIAL HYPERTENSION: ICD-10-CM

## 2021-11-23 PROCEDURE — 99214 OFFICE O/P EST MOD 30 MIN: CPT | Performed by: NURSE PRACTITIONER

## 2021-11-23 NOTE — PROGRESS NOTES
Chief Complaint  Hypertension and Follow-up    Subjective    History of Present Illness {CC  Problem List  Visit  Diagnosis   Encounters  Notes  Medications  Labs  Result Review Imaging  Media :23}     Torsten Jackson, 76 y.o. male with acute embolic CVA, HTN newly diagnosed PAF presents to [facility] Heart and Valve clinic for Hypertension and Follow-up.    Patient recently evaluated at [facility] after developing transient dysarthria and expressive aphasia/word salad (wife indicates that she had difficulty understanding him); he also noted some mild tingling or numbness of the left upper extremity. Comprehensive workup was completed including MRI of the brain revealing multifocal small areas of acute infarct involving the right frontal lobe and left posterior temporal lobe suggesting central embolic source. TTE negative for right to left atrial shunt with preserved LVEF of 60%, and 24 hour post tPA CTH negative for hemorrhage and patient was initiated on Eliquis and ASA per Neurology. Cardiology was consulted for embolic concerns and patient underwent TTE with negative bubble study as well as MARISELA per Dr. Grossman on 11/10 which was negative for cardiac source of emboli. patient developed atrial fibrillation which was rate controlled and patient was asymptomatic with it. He did continue to be symptomatic with transient blood pressure drop and antihypertensives discontinued by cardiology. Plan was to continue with Multaq, however insurance refused to cover this (despite prior authorization) and he was instead transitioned to PO Amiodarone 200 mg daily. He will continue on Eliquis 5 mg BID and low-dose ASA. He was instructed to follow up in the heart/valve clinic in 2 weeks, with Dr. Ratliff in 4-6 weeks, and Neurology in 4 weeks.  During previous visit to heart valve clinic patient noted elevated SBP at home generally 140-170, and denies further arrhythmia symptoms; patient was initiated  "on valsartan 80mg daily.    Patient presents today feeling well overall.  SBP generally well controlled at home, SBP range generally 130-140.  He has been quite active and denies cardiac symptoms during activities.  He denies anginal symptoms, dyspnea, palpitation, racing heart, and syncope.  Denies bleeding complications on apixaban.  Discussed plan to decrease amiodarone as prescribed.      Objective     Vital Signs:   Vitals:    11/23/21 0904   BP: 140/64   BP Location: Left arm   Patient Position: Sitting   Cuff Size: Adult   Pulse: (!) 48   Resp: 16   Temp: 97 °F (36.1 °C)   TempSrc: Temporal   SpO2: 98%   Weight: 89.6 kg (197 lb 8 oz)   Height: 188 cm (74\")     Body mass index is 25.36 kg/m².  Physical Exam  Vitals and nursing note reviewed.   Constitutional:       Appearance: Normal appearance.   HENT:      Head: Normocephalic.   Eyes:      Extraocular Movements: Extraocular movements intact.   Neck:      Vascular: No carotid bruit.   Cardiovascular:      Rate and Rhythm: Regular rhythm. Bradycardia present.      Pulses: Normal pulses.      Heart sounds: Normal heart sounds, S1 normal and S2 normal. No murmur heard.      Pulmonary:      Effort: Pulmonary effort is normal. No respiratory distress.      Breath sounds: Normal breath sounds.   Musculoskeletal:      Cervical back: Neck supple.      Right lower leg: No edema.      Left lower leg: No edema.   Skin:     General: Skin is warm and dry.   Neurological:      General: No focal deficit present.      Mental Status: He is alert.   Psychiatric:         Mood and Affect: Mood normal.         Behavior: Behavior normal.         Thought Content: Thought content normal.        Data Reviewed:{ Labs  Result Review  Imaging  Med Tab  Media :23}     Basic Metabolic Panel (11/11/2021 08:18)  TSH (11/11/2021 08:18)  CBC & Differential (11/10/2021 04:42)  Lipid Panel (11/09/2021 06:00)  Adult Transesophageal Echo (MARISELA) W/ Cont if Necessary Per Protocol (11/10/2021 " 15:07)  Doppler Transcranial Microbubble Injection CAR (11/09/2021 17:31)  Duplex Carotid Ultrasound CAR (11/09/2021 11:35)  Adult Transthoracic Echo Complete W/ Cont if Necessary Per Protocol (11/09/2021 10:57)  ECG 12 Lead (11/16/2021 09:10)      Assessment and Plan {CC Problem List  Visit Diagnosis  ROS  Review (Popup)  Health Maintenance  Quality  BestPractice  Medications  SmartSets  SnapShot Encounters  Media :23}     1. PAF with rapid ventricular response. New diagnosis 11/11/2021 (HCC)  -Denies arrhythmia symptoms. Previously was asymptomatic with atrial fibrillation during recent hospitalization  -Sinus bradycardia at time of visit  -QT/QTc stable at previous visit on amiodarone  -[id number]: 5  -Continue apixaban BID as prescribed for anticoagulation  -Continue amiodarone 200mg BID; decrrease to daily dosing on 11/27/21  -No current follow-up scheduled with Dr. Ratliff.  Will message cardiology scheduling for office visit with Dr. Ratliff as directed at discharge.    2. Essential hypertension  -Improved control since previous visit  -Home BP log with SBP generally 130-140.  -Continue valsartan 80 mg daily  -Continue to monitor home BP, continue home BP log    3. 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 TTE with no evidence of shunt, MARISELA with no evidence of thrombus  -Denies further neurologic deficits  -Continue ASA, atorvastatin as prescribed  -Apixaban as above for anticoagulation  -Continue follow-up with nuerology as scheduled      Follow Up {Instructions Charge Capture  Follow-up Communications :23}     Return in about 2 weeks (around 12/7/2021) for Office follow-up, BP check.      Patient was given instructions and counseling regarding his condition or for health maintenance advice. Please see specific information pulled into the AVS if appropriate.  Patient was instructed to call the Heart and Valve Center with  any questions, concerns, or worsening symptoms.    Dictated Utilizing Dragon Dictation   Please note that portions of this note were completed with a voice recognition program.   Part of this note may be an electronic transcription/translation of spoken language to printed text using the Dragon Dictation System.

## 2021-11-23 NOTE — OUTREACH NOTE
Stroke Week 2 Survey      Responses   [facility] patient discharged from? [facility]   Does the patient have one of the following disease processes/diagnoses(primary or secondary)? Stroke (TIA)   Week 2 attempt successful? Yes   Call start time 1621   Call end time 1623   Discharge diagnosis Acute embolic CVA manifesting only as expressive aphasia    Is patient permission given to speak with other caregiver? Yes   List who call center can speak with Lucía, spouse   Medication alerts for this patient Pacerone, Eliquis, ASA, Lipitor   Meds reviewed with patient/caregiver? Yes   Does the patient have all medications ordered at discharge? Yes   Is the patient taking all medications as directed (includes completed medication regime)? Yes   Does the patient have a primary care provider?  Yes   Does the patient have an appointment with their PCP within 7 days of discharge? Yes   Comments regarding PCP Lauren De Guzman MD PCP   Has the patient kept scheduled appointments due by today? Yes   Comments Uri Agudelo cardiology APRN 11/23/21, Dr Rubio 1/19/22   Has home health visited the patient within 72 hours of discharge? N/A   Psychosocial issues? No   Does the patient have any residual symptoms from stroke/TIA? No   Does the patient understand the diet ordered at discharge? Yes   Did the patient receive a copy of their discharge instructions? Yes   Nursing interventions Reviewed instructions with patient   What is the patient's perception of their health status since discharge? Improving   Nursing interventions Nurse provided patient education   Is the patient able to teach back FAST for Stroke? Yes   Is the patient/caregiver able to teach back the risk factors for a stroke? High blood pressure-goal below 120/80,  History of Afib,  Sleep apnea   Is the patient/caregiver able to teach back signs and symptoms related to disease process for when to call PCP? Yes   Is the patient/caregiver able to teach back signs and  symptoms related to disease process for when to call 911? Yes   If the patient is a current smoker, are they able to teach back resources for cessation? Not a smoker   Is the patient/caregiver able to teach back the hierarchy of who to call/visit for symptoms/problems? PCP, Specialist, Home health nurse, Urgent Care, ED, 911 Yes   Week 2 call completed? Yes   Wrap up additional comments He is doing very well. Feels like he almost back to his old self.           Susanna Murphy RN

## 2021-12-01 ENCOUNTER — READMISSION MANAGEMENT (OUTPATIENT)
Dept: CALL CENTER | Facility: HOSPITAL | Age: 76
End: 2021-12-01

## 2021-12-01 NOTE — OUTREACH NOTE
Stroke Week 3 Survey      Responses   [facility] patient discharged from? [facility]   Does the patient have one of the following disease processes/diagnoses(primary or secondary)? Stroke (TIA)   Week 3 attempt successful? Yes   Call start time 1221   Call end time 1223   Discharge diagnosis Acute embolic CVA manifesting only as expressive aphasia    Is patient permission given to speak with other caregiver? Yes   List who call center can speak with Lucía, spouse   Person spoke with today (if not patient) and relationship Lucía, spouse   Meds reviewed with patient/caregiver? Yes   Is the patient having any side effects they believe may be caused by any medication additions or changes? No   Does the patient have all medications ordered at discharge? Yes   Is the patient taking all medications as directed (includes completed medication regime)? Yes   Does the patient have a primary care provider?  Yes   Does the patient have an appointment with their PCP within 7 days of discharge? Yes   Has the patient kept scheduled appointments due by today? Yes   Comments Uri Agudelo cardiology APRN 11/23/21, Dr Rubio 1/19/22   Has home health visited the patient within 72 hours of discharge? N/A   Psychosocial issues? No   Does the patient require any assistance with activities of daily living such as eating, bathing, dressing, walking, etc.? No   Does the patient have any residual symptoms from stroke/TIA? No   Does the patient understand the diet ordered at discharge? Yes   Did the patient receive a copy of their discharge instructions? Yes   Nursing interventions Reviewed instructions with patient   What is the patient's perception of their health status since discharge? Improving   Nursing interventions Nurse provided patient education   Is the patient able to teach back FAST for Stroke? Yes   Is the patient/caregiver able to teach back the risk factors for a stroke? High blood pressure-goal below 120/80,  History of  Afib,  Sleep apnea,  High Cholesterol,  Carotid or other artery disease   Is the patient/caregiver able to teach back signs and symptoms related to disease process for when to call PCP? Yes   Is the patient/caregiver able to teach back signs and symptoms related to disease process for when to call 911? Yes   If the patient is a current smoker, are they able to teach back resources for cessation? Not a smoker   Is the patient/caregiver able to teach back the hierarchy of who to call/visit for symptoms/problems? PCP, Specialist, Home health nurse, Urgent Care, ED, 911 Yes   Week 3 call completed? Yes   Wrap up additional comments He is doing very well. Feels like he is back to baseline.          Scott Boateng RN

## 2021-12-07 ENCOUNTER — OFFICE VISIT (OUTPATIENT)
Dept: CARDIOLOGY | Facility: HOSPITAL | Age: 76
End: 2021-12-07

## 2021-12-07 VITALS
BODY MASS INDEX: 25.17 KG/M2 | OXYGEN SATURATION: 99 % | RESPIRATION RATE: 17 BRPM | WEIGHT: 196.13 LBS | HEART RATE: 63 BPM | SYSTOLIC BLOOD PRESSURE: 136 MMHG | HEIGHT: 74 IN | DIASTOLIC BLOOD PRESSURE: 73 MMHG | TEMPERATURE: 97.6 F

## 2021-12-07 DIAGNOSIS — I63.9 ACUTE CVA (CEREBROVASCULAR ACCIDENT) (HCC): Primary | ICD-10-CM

## 2021-12-07 DIAGNOSIS — I48.0 PAROXYSMAL ATRIAL FIBRILLATION WITH RAPID VENTRICULAR RESPONSE (HCC): ICD-10-CM

## 2021-12-07 DIAGNOSIS — I10 ESSENTIAL HYPERTENSION: ICD-10-CM

## 2021-12-07 PROCEDURE — 99214 OFFICE O/P EST MOD 30 MIN: CPT | Performed by: NURSE PRACTITIONER

## 2021-12-07 RX ORDER — AMIODARONE HYDROCHLORIDE 200 MG/1
200 TABLET ORAL DAILY
Qty: 90 TABLET | Refills: 1 | Status: SHIPPED | OUTPATIENT
Start: 2021-12-07 | End: 2022-02-25

## 2021-12-07 RX ORDER — FAMOTIDINE 20 MG/1
20 TABLET, FILM COATED ORAL DAILY
Qty: 90 TABLET | Refills: 1 | Status: SHIPPED | OUTPATIENT
Start: 2021-12-07 | End: 2022-06-14

## 2021-12-07 RX ORDER — ATORVASTATIN CALCIUM 40 MG/1
40 TABLET, FILM COATED ORAL NIGHTLY
Qty: 90 TABLET | Refills: 2 | Status: SHIPPED | OUTPATIENT
Start: 2021-12-07

## 2021-12-07 RX ORDER — VALSARTAN 80 MG/1
80 TABLET ORAL DAILY
Qty: 90 TABLET | Refills: 2 | Status: SHIPPED | OUTPATIENT
Start: 2021-12-07 | End: 2023-01-03 | Stop reason: ALTCHOICE

## 2021-12-07 NOTE — PROGRESS NOTES
Chief Complaint  Stroke and Follow-up    Subjective    History of Present Illness {CC  Problem List  Visit  Diagnosis   Encounters  Notes  Medications  Labs  Result Review Imaging  Media :23}     Torsten Jackson, 76 y.o. male with acute embolic CVA, HTN newly diagnosed PAF presents to [facility] Heart and Valve clinic for Stroke and Follow-up.    Patient recently evaluated at [facility] after developing transient dysarthria and expressive aphasia/word salad (wife indicates that she had difficulty understanding him); he also noted some mild tingling or numbness of the left upper extremity. Comprehensive workup was completed including MRI of the brain revealing multifocal small areas of acute infarct involving the right frontal lobe and left posterior temporal lobe suggesting central embolic source. TTE negative for right to left atrial shunt with preserved LVEF of 60%, and 24 hour post tPA CTH negative for hemorrhage and patient was initiated on Eliquis and ASA per Neurology. Cardiology was consulted for embolic concerns and patient underwent TTE with negative bubble study as well as MARISELA per Dr. Grossman on 11/10 which was negative for cardiac source of emboli. patient developed atrial fibrillation which was rate controlled and patient was asymptomatic with it. He did continue to be symptomatic with transient blood pressure drop and antihypertensives discontinued by cardiology. Plan was to continue with Multaq, however insurance refused to cover this (despite prior authorization) and he was instead transitioned to PO Amiodarone 200 mg daily. He will continue on Eliquis 5 mg BID and low-dose ASA. He was instructed to follow up in the heart/valve clinic in 2 weeks, with Dr. Ratliff in 4-6 weeks, and Neurology in 4 weeks.  During previous visit to heart valve clinic patient noted SBP generally well controlled at home, SBP range generally 130-140, he was maintained on valsartan 80 mg daily.   "    He has been quite active and denies cardiac symptoms during activities.  He has returned to running, and has been running multiple times per week.  SBP generally 120-130 at home.  He denies anginal symptoms, dyspnea, palpitation, racing heart, and syncope.  Denies bleeding complications on apixaban.  He has decreased amiodarone as directed.  He has yet to establish with Dr. Ratliff.      Objective     Vital Signs:   Vitals:    12/07/21 0907   BP: 136/73   BP Location: Left arm   Patient Position: Sitting   Cuff Size: Adult   Pulse: 63   Resp: 17   Temp: 97.6 °F (36.4 °C)   TempSrc: Temporal   SpO2: 99%   Weight: 89 kg (196 lb 2 oz)   Height: 188 cm (74\")     Body mass index is 25.18 kg/m².  Physical Exam  Vitals and nursing note reviewed.   Constitutional:       Appearance: Normal appearance.   HENT:      Head: Normocephalic.   Eyes:      Extraocular Movements: Extraocular movements intact.   Neck:      Vascular: No carotid bruit.   Cardiovascular:      Rate and Rhythm: Regular rhythm. Bradycardia present.      Pulses: Normal pulses.      Heart sounds: Normal heart sounds, S1 normal and S2 normal. No murmur heard.      Pulmonary:      Effort: Pulmonary effort is normal. No respiratory distress.      Breath sounds: Normal breath sounds.   Musculoskeletal:      Cervical back: Neck supple.      Right lower leg: No edema.      Left lower leg: No edema.   Skin:     General: Skin is warm and dry.   Neurological:      General: No focal deficit present.      Mental Status: He is alert.   Psychiatric:         Mood and Affect: Mood normal.         Behavior: Behavior normal.         Thought Content: Thought content normal.        Data Reviewed:{ Labs  Result Review  Imaging  Med Tab  Media :23}     Basic Metabolic Panel (11/11/2021 08:18)  TSH (11/11/2021 08:18)  CBC & Differential (11/10/2021 04:42)  Lipid Panel (11/09/2021 06:00)  Adult Transesophageal Echo (MARISELA) W/ Cont if Necessary Per Protocol (11/10/2021 " 15:07)  Doppler Transcranial Microbubble Injection CAR (11/09/2021 17:31)  Duplex Carotid Ultrasound CAR (11/09/2021 11:35)  Adult Transthoracic Echo Complete W/ Cont if Necessary Per Protocol (11/09/2021 10:57)  ECG 12 Lead (11/16/2021 09:10)      Assessment and Plan {CC Problem List  Visit Diagnosis  ROS  Review (Popup)  Health Maintenance  Quality  BestPractice  Medications  SmartSets  SnapShot Encounters  Media :23}     1. PAF with rapid ventricular response. New diagnosis 11/11/2021 (HCC)  -Denies arrhythmia symptoms. Previously was asymptomatic with atrial fibrillation during recent hospitalization  -Regular rate and rhythm at time of exam  -[id number]: 5  -Continue apixaban BID as prescribed for anticoagulation  -Continue amiodarone 200mg daily  -No current follow-up scheduled with Dr. Ratliff.  Will message cardiology scheduling for office visit with Dr. Ratliff as directed at discharge.    2. Essential hypertension  -Well-controlled at time of visit  -Home BP log with SBP generally 120-130s.  -Continue valsartan 80 mg daily  -Continue to monitor home BP, continue home BP log    3. 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 TTE with no evidence of shunt, MARISELA with no evidence of thrombus  -Continue ASA, atorvastatin as prescribed  -Apixaban as above for anticoagulation  -Continue follow-up with nuerology as scheduled      Follow Up {Instructions Charge Capture  Follow-up Communications :23}     Return in about 1 month (around 1/7/2022) for Office follow-up, BP check.      Patient was given instructions and counseling regarding his condition or for health maintenance advice. Please see specific information pulled into the AVS if appropriate.  Patient was instructed to call the [facility] Center with any questions, concerns, or worsening symptoms.    Dictated Utilizing Dragon Dictation   Please note that portions of this  note were completed with a voice recognition program.   Part of this note may be an electronic transcription/translation of spoken language to printed text using the Dragon Dictation System.

## 2021-12-13 ENCOUNTER — READMISSION MANAGEMENT (OUTPATIENT)
Dept: CALL CENTER | Facility: HOSPITAL | Age: 76
End: 2021-12-13

## 2021-12-15 ENCOUNTER — OFFICE VISIT (OUTPATIENT)
Dept: CARDIOLOGY | Facility: CLINIC | Age: 76
End: 2021-12-15

## 2021-12-15 VITALS
SYSTOLIC BLOOD PRESSURE: 140 MMHG | DIASTOLIC BLOOD PRESSURE: 80 MMHG | WEIGHT: 199.2 LBS | BODY MASS INDEX: 24.77 KG/M2 | HEART RATE: 90 BPM | HEIGHT: 75 IN | OXYGEN SATURATION: 98 %

## 2021-12-15 DIAGNOSIS — R94.31 ABNORMAL ELECTROCARDIOGRAM (ECG) (EKG): ICD-10-CM

## 2021-12-15 DIAGNOSIS — R68.89 EXERCISE INTOLERANCE: ICD-10-CM

## 2021-12-15 DIAGNOSIS — Z86.73 HISTORY OF CVA (CEREBROVASCULAR ACCIDENT): Primary | ICD-10-CM

## 2021-12-15 DIAGNOSIS — Z79.899 ON AMIODARONE THERAPY: ICD-10-CM

## 2021-12-15 DIAGNOSIS — R53.83 FATIGUE, UNSPECIFIED TYPE: ICD-10-CM

## 2021-12-15 DIAGNOSIS — I48.0 PAROXYSMAL ATRIAL FIBRILLATION (HCC): ICD-10-CM

## 2021-12-15 DIAGNOSIS — I10 ESSENTIAL HYPERTENSION: ICD-10-CM

## 2021-12-15 PROCEDURE — 99214 OFFICE O/P EST MOD 30 MIN: CPT | Performed by: INTERNAL MEDICINE

## 2021-12-15 NOTE — PROGRESS NOTES
Subjective:     Encounter Date:12/15/2021    Patient ID: Torsten Jackson is a 76 y.o.  white male, semiretired [facility]     Chief Complaint:   Chief Complaint   Patient presents with   • Acute CVA (cerebrovascular accident) (HCC)     PHYSICIAN: Lauren Shah MD  NEUROLOGIST: Matias Cherry MD    Problem List:  1. Acute ischemic stroke  a. Status post TIA  b. Manifested as expressive aphasia, November 2021  c. Located within [facility] 5-day admission for acute embolic CVA, November 2021, confirmed by head imaging including CT head, MRI brain, CTA Head, and CT cerebral perfusion. He was administered TPA. He had an acceptable MARISELA during admission with negative bubble study.  He was started Exforge. He was also diagnosed with atrial fibrillation and he was started on amiodarone 200 mg daily as well as Eliquis and low-dose aspirin.  2. Atrial fibrillation  a. Diagnosed during November 2021 admission  b. CHADSVASC=5  c. Initiation of amiodarone and Eliquis  d. Echocardiogram, November 2021: LVEF 0.60. TR with RVSP 25 mmHg. Saline test negative for right to left atrial level shunt. Mild concentric LVH. Grade II w/high LAP LV pseudonormalization. Moderate aortic valve sclerosis. No PFO.  e. Recent exertional dyspnea and fatigue of NYHA class II severity, December 2021.  3. Labile hypertension- probably essential  4. Glaucoma  5. BPH    No Known Allergies    Current Outpatient Medications:   •  amiodarone (PACERONE) 200 MG tablet, Take 1 tablet by mouth Daily., Disp: 90 tablet, Rfl: 1  •  apixaban (ELIQUIS) 5 MG tablet tablet, Take 1 tablet by mouth Every 12 (Twelve) Hours., Disp: 60 tablet, Rfl: 2  •  aspirin 81 MG EC tablet, Take 1 tablet by mouth Daily., Disp: 60 tablet, Rfl: 2  •  atorvastatin (LIPITOR) 40 MG tablet, Take 1 tablet by mouth Every Night., Disp: 90 tablet, Rfl: 2  •  famotidine (PEPCID) 20 MG tablet, Take 1 tablet by mouth Daily., Disp: 90 tablet, Rfl: 1  •  multivitamin  "(MULTIPLE VITAMIN PO), Take 1 tablet by mouth Daily., Disp: , Rfl:   •  valsartan (DIOVAN) 80 MG tablet, Take 1 tablet by mouth Daily., Disp: 90 tablet, Rfl: 2    History of Present Illness: Patient returns for 1 month hospital follow up. He had a [facility] 5-day admission for acute embolic CVA in November 2021 confirmed by head imaging including CT head, MRI brain, CTA Head, and CT cerebral perfusion. He was administered TPA. He had an acceptable MARISELA during admission with negative bubble study.  He was started on Multaq and Exforge and Multaq was altered to amiodarone due to availability. He was also diagnosed with atrial fibrillation and he was started on amiodarone 200 mg daily as well as Eliquis and low-dose aspirin. He reports he has been feeling well overall from a cardiovascular standpoint since then. Patient denies chest pain, shortness of breath, palpitations, edema, dizziness, and syncope. He has resumed his regular exercise routine with jogging. He reports he will run/walk but after some period of running his legs will become fatigued and he needs to stop. He has had no additional interim ER visits, hospitalizations, serious illnesses, or surgeries. He has received COVID immunization as well as the booster. He reports he used to have apneic spells and snoring but he has been sleeping better overall since he was discharged. He states his weight has consistently been around 194-199 lbs for several years. He is accompanied to the office today by his wife who confirms his history.      ROS   Obtained and negative except as outlined in problem list and HPI.    Procedures       Objective:       Vitals:    12/15/21 1059 12/15/21 1102 12/15/21 1117   BP: 148/83 171/96 140/80   BP Location: Left arm Left arm Left arm   Patient Position: Sitting Standing Sitting   Pulse: 54 90    SpO2: 98%     Weight: 90.4 kg (199 lb 3.2 oz)     Height: 189.2 cm (74.5\")       Body mass index is 25.23 kg/m².  Last weight: 196 lbs "     Vitals reviewed.   Constitutional:       Appearance: Well-developed.   Neck:      Thyroid: No thyromegaly.      Vascular: No carotid bruit or JVD.      Lymphadenopathy: No cervical adenopathy.   Pulmonary:      Effort: Pulmonary effort is normal.      Breath sounds: Normal breath sounds. No wheezing. No rhonchi. No rales.   Cardiovascular:      Occasional ectopic beats. Regular rhythm.      Murmurs: There is a grade 1/6 mid frequency midsystolic murmur at the URSB.      No gallop. No S3 gallop.   Pulses:     Dorsalis pedis: 2+ bilaterally.     Posterior tibial: 2+ bilaterally.  Edema:     Peripheral edema absent.   Abdominal:      Palpations: Abdomen is soft. There is no abdominal mass.      Tenderness: There is no abdominal tenderness.   Musculoskeletal: Normal range of motion. Skin:     General: Skin is warm and dry.      Findings: No rash.   Neurological:      Mental Status: Alert and oriented to person, place, and time.           Lab Review:   Lab Results   Component Value Date    GLUCOSE 104 (H) 11/11/2021    BUN 14 11/11/2021    CREATININE 0.94 11/11/2021    EGFRIFNONA 78 11/11/2021    BCR 14.9 11/11/2021     11/11/2021    K 4.1 11/11/2021     11/11/2021    CO2 24.0 11/11/2021    CALCIUM 8.8 11/11/2021    ALBUMIN 4.30 11/08/2021    AST 23 11/08/2021    AST 26 11/08/2021    ALT 19 11/08/2021    ALT 19 11/08/2021       Lab Results   Component Value Date    WBC 8.08 11/10/2021    HGB 14.5 11/10/2021    HCT 42.9 11/10/2021    MCV 90.9 11/10/2021     11/10/2021       Lab Results   Component Value Date    HGBA1C 5.20 11/09/2021       Lab Results   Component Value Date    TSH 2.240 11/11/2021       Lab Results   Component Value Date    CHOL 158 11/09/2021     Lab Results   Component Value Date    TRIG 171 (H) 11/09/2021     Lab Results   Component Value Date    HDL 30 (L) 11/09/2021     Lab Results   Component Value Date    LDL 98 11/09/2021     CT Head, 11/8/2021:  1. No acute intracranial  finding specifically no acute intracranial hemorrhage.  2. Sinonasal mucosal disease centered within the right maxillary sinus and ethmoid air cells concerning for sinusitis or ethmoiditis.    CTA Head and Neck, 11/8/2021:  Although no large vessel occlusion, middle cerebral arteries demonstrate irregularities throughout the bilateral MCA territories, right M2 and M3 segments, moderate-to-severe stenoses and irregularity along with area of severe stenosis and near total occlusion left M3 segment, for example series 9- image 59.      CT Cerebral Perfusion, 11/8/2021:  Abnormal perfusion with small area of left posterior MCA distribution frontoparietal region reversible ischemia.    XR Chest, 11/8/2021:  No acute cardiopulmonary process.    CT Head, 11/8/2021:  No interval change or acute findings specifically, no intracranial hemorrhage. Status post TPA administration. No midline shift or hydrocephalus.    MRI Brain, 11/9/2021:  Multifocal small areas of acute infarct involving the right frontal lobe and left posterior temporal lobe, favoring central embolic source. There is no associated hemorrhage or mass effect. An area of susceptibility artifact noted within the posterior aspect of the left sylvian fissure likely represents some persisting left distal M2 thrombus. Mild typical age-related changes are otherwise present as above. There is no evidence of mass, mass effect or acute hemorrhage.    Carotid Duplex, 11/9/2021:  · Right internal carotid artery stenosis of 0-49%.  · Left internal carotid artery stenosis of 0-49%.  · Bilateral antegrade vertebral flow.    CT Head, 11/9/2021:  Recently noted areas of small acute infarct in the right frontal lobe and left temporal lobe are not well appreciated on CT. There is no distinct evidence of acute territorial ischemia. There is no intracranial hemorrhage, mass or mass effect.     Transcranial Doppler, 11/9/2021:  Normal mean velocities and waveforms are seen in the  right and left MCA and terminal ICA. Following injection of agitated saline, both before and after Valsalva, no abnormal signals are seen in either MCA. Negative bubble study.    Echocardiogram, 11/9/2021:  · Estimated right ventricular systolic pressure from tricuspid regurgitation is normal (<35 mmHg). Calculated right ventricular systolic pressure from tricuspid regurgitation is 25 mmHg.  · Saline test results are negative for right to left atrial level shunt.  · Left ventricular wall thickness is consistent with mild concentric hypertrophy.  · Estimated left ventricular EF = 60% Estimated left ventricular EF was in agreement with the calculated left ventricular EF. Left ventricular ejection fraction appears to be 56 - 60%. Left ventricular systolic function is normal.  · Left ventricular diastolic function is consistent with (grade II w/high LAP) pseudonormalization.  · There is calcification of the aortic valve mainly affecting the non-coronary and left coronary cusp(s).  · Normal right ventricular cavity size, wall thickness, systolic function and septal motion noted.  · The aortic valve exhibits moderate sclerosis.  · No evidence of pulmonary hypertension is present.  · There is no evidence of pericardial effusion.  · No evidence of a patent foramen ovale.    MARISELA, 11/10/2021:  · Estimated left ventricular EF = 55% Left ventricular systolic function is normal.  · Left ventricular wall thickness is consistent with mild concentric hypertrophy  · The left atrial cavity is mild to moderately dilated  · No evidence of a left atrial appendage thrombus was present.  · No evidence of a patent foramen ovale. No evidence of an atrial septal defect present. Saline test results are negative.  · The aortic valve exhibits sclerosis.  · Mild mitral annular calcification is present. Mild mitral valve regurgitation is present  · There is (grade 1) plaque in the descending aorta present.  · No obvious cardiac source of emboli  demonstrated on this study.          Assessment:       Overall continued acceptable course with no new interim cardiopulmonary complaints with acceptable functional status. We will defer additional diagnostic or therapeutic intervention from a cardiac perspective at this time other than to initiate screening for possible obstructive sleep apnea as well as myocardial ischemia.     Diagnosis Plan   1. History of CVA (cerebrovascular accident)  No acute neurologic symptoms and improved overall.   2. Paroxysmal atrial fibrillation (HCC)  No documented recurrence. Continue current treatment.   3. Essential hypertension  Acceptable control. Continue current treatment.          Plan:         1. Patient to continue current medications and close follow up with the above providers.  2. Cardiolite GXT and nocturnal oximetry screening study ordered.  3. He should limit his activity to walking until after his stress test.  4. Tentative cardiology follow up in March 2022 or patient may return sooner PRN.    I, Mango Singleton, attest that this documentation has been prepared under the direction and in the presence of Rony Ratliff MD 12/15/2021    IRony MD, [facility], personally performed the services described in this documentation as scribed by the above named individual in my presence, and it is both accurate and complete. At 11:51 EST on 12/15/2021

## 2022-01-11 ENCOUNTER — OFFICE VISIT (OUTPATIENT)
Dept: CARDIOLOGY | Facility: HOSPITAL | Age: 77
End: 2022-01-11

## 2022-01-11 VITALS
DIASTOLIC BLOOD PRESSURE: 69 MMHG | TEMPERATURE: 98.1 F | HEART RATE: 70 BPM | WEIGHT: 200.13 LBS | HEIGHT: 75 IN | BODY MASS INDEX: 24.88 KG/M2 | SYSTOLIC BLOOD PRESSURE: 138 MMHG | RESPIRATION RATE: 18 BRPM | OXYGEN SATURATION: 98 %

## 2022-01-11 DIAGNOSIS — Z86.73 HISTORY OF CVA (CEREBROVASCULAR ACCIDENT): ICD-10-CM

## 2022-01-11 DIAGNOSIS — I10 ESSENTIAL HYPERTENSION: ICD-10-CM

## 2022-01-11 DIAGNOSIS — I48.0 PAROXYSMAL ATRIAL FIBRILLATION: Primary | ICD-10-CM

## 2022-01-11 PROCEDURE — 99214 OFFICE O/P EST MOD 30 MIN: CPT | Performed by: NURSE PRACTITIONER

## 2022-01-11 NOTE — PROGRESS NOTES
Chief Complaint  Hypertension and Follow-up    Subjective    History of Present Illness {CC  Problem List  Visit  Diagnosis   Encounters  Notes  Medications  Labs  Result Review Imaging  Media :23}     Torsten Jackson, 76 y.o. male with acute embolic CVA, HTN, newly diagnosed PAF presents to [facility] for Hypertension and Follow-up.     Patient recently evaluated at [facility] after developing transient dysarthria and expressive aphasia/word salad (wife indicates that she had difficulty understanding him); he also noted some mild tingling or numbness of the left upper extremity. Comprehensive workup was completed including MRI of the brain revealing multifocal small areas of acute infarct involving the right frontal lobe and left posterior temporal lobe suggesting central embolic source. TTE negative for right to left atrial shunt with preserved LVEF of 60%, and 24 hour post tPA CTH negative for hemorrhage and patient was initiated on Eliquis and ASA per Neurology. Cardiology was consulted for embolic concerns and patient underwent TTE with negative bubble study as well as MARISELA per Dr. Grossman on 11/10 which was negative for cardiac source of emboli. patient developed atrial fibrillation which was rate controlled and patient was asymptomatic with it. He did continue to be symptomatic with transient blood pressure drop and antihypertensives discontinued by cardiology. Plan was to continue with Multaq, however insurance refused to cover this (despite prior authorization) and he was instead transitioned to PO Amiodarone 200 mg daily. He will continue on Eliquis 5 mg BID and low-dose ASA.  Patient completed follow-up with heart valve clinic with valsartan titration for further blood pressure control.  Since previous heart valve clinic patient established with Dr. Ratliff on 12/15/2021; he was instructed to continue current medications and Cardiolite GXT was ordered due  "to fatigue with exercise.  He was instructed to limit his activity to walking until after completing stress test.  Patient is scheduled for a nuclear stress test on 1/24/2022.    Patient presents today in follow-up for hypertension.  He notes increased fatigue and decreased exercise capacity which was discussed with Dr. Ratliff and stress test scheduled. SBP at home  generally 120-130s. Heart rate consistenly around 60. No arrhythmia symptoms.  He denies chest pain, dyspnea, heart racing, syncope.      Objective     Vital Signs:   Vitals:    01/11/22 0921   BP: 138/69   BP Location: Left arm   Patient Position: Sitting   Cuff Size: Adult   Pulse: 70   Resp: 18   Temp: 98.1 °F (36.7 °C)   TempSrc: Temporal   SpO2: 98%   Weight: 90.8 kg (200 lb 2 oz)   Height: 189.2 cm (74.5\")     Body mass index is 25.35 kg/m².  Physical Exam  Vitals and nursing note reviewed.   Constitutional:       Appearance: Normal appearance.   HENT:      Head: Normocephalic.   Eyes:      Extraocular Movements: Extraocular movements intact.   Neck:      Vascular: No carotid bruit.   Cardiovascular:      Rate and Rhythm: Normal rate and regular rhythm.      Pulses: Normal pulses.      Heart sounds: Normal heart sounds, S1 normal and S2 normal. No murmur heard.      Pulmonary:      Effort: Pulmonary effort is normal. No respiratory distress.      Breath sounds: Normal breath sounds.   Musculoskeletal:      Cervical back: Neck supple.      Right lower leg: No edema.      Left lower leg: No edema.   Skin:     General: Skin is warm and dry.   Neurological:      General: No focal deficit present.      Mental Status: He is alert.   Psychiatric:         Mood and Affect: Mood normal.         Behavior: Behavior normal.         Thought Content: Thought content normal.        Data Reviewed:{ Labs  Result Review  Imaging  Med Tab  Media :23}     ECG 12 Lead (11/16/2021 09:10)  Adult Transesophageal Echo (MARISELA) W/ Cont if Necessary Per Protocol (11/10/2021 " 15:07)  Duplex Carotid Ultrasound CAR (11/09/2021 11:35)  Adult Transthoracic Echo Complete W/ Cont if Necessary Per Protocol (11/09/2021 10:57)  Basic Metabolic Panel (11/11/2021 08:18)  TSH (11/11/2021 08:18)  CBC & Differential (11/10/2021 04:42)  Lipid Panel (11/09/2021 06:00)  Hemoglobin A1c (11/09/2021 06:00)  Progress Notes by Rony Ratliff MD (12/15/2021 11:00)      Assessment and Plan {CC Problem List  Visit Diagnosis  ROS  Review (Popup)  Health Maintenance  Quality  BestPractice  Medications  SmartSets  SnapShot Encounters  Media :23}     1. Paroxysmal atrial fibrillation (HCC)  -Denies arrhythmia symptoms. Previously was asymptomatic with atrial fibrillation during recent hospitalization  -Regular rate and rhythm at time of exam  -[id number]: 5  -Continue apixaban BID as prescribed for anticoagulation  -Continue amiodarone 200mg daily  -Continue follow-up with Dr. Ratliff as scheduled    2. Essential hypertension  -Reasonable control at time of visit  -SBP at home  generally 120-130s.  -Continue valsartan 80 mg daily  -Continue to monitor home BP, continue home BP log    3. History of CVA (cerebrovascular accident)  -Recent hospitalization with newly diagnosed AF. TTE with no evidence of shunt, MARISELA with no evidence of thrombus  -Continue ASA, atorvastatin as prescribed  -Apixaban as above for anticoagulation  -Continue follow-up with nuerology as scheduled    4. Fatigue/decreased exercise tolerance  -Increased fatigue and decreased exercise capacity which was discussed with Dr. Ratliff and stress test scheduled.  -Denies chest pain/pressure with exertion  -Discussed stress test instructions with patient  -Continue with stress test as scheduled for definitive ischemic evaluation given his decreased exercise tolerance    Follow Up {Instructions Charge Capture  Follow-up Communications :23}     Return if symptoms worsen or fail to improve.      Patient was given instructions and counseling  regarding his condition or for health maintenance advice. Please see specific information pulled into the AVS if appropriate.  Patient was instructed to call the [facility] with any questions, concerns, or worsening symptoms.    Dictated Utilizing Dragon Dictation   Please note that portions of this note were completed with a voice recognition program.   Part of this note may be an electronic transcription/translation of spoken language to printed text using the Dragon Dictation System.

## 2022-01-24 ENCOUNTER — APPOINTMENT (OUTPATIENT)
Dept: CARDIOLOGY | Facility: HOSPITAL | Age: 77
End: 2022-01-24

## 2022-01-24 ENCOUNTER — HOSPITAL ENCOUNTER (OUTPATIENT)
Dept: CARDIOLOGY | Facility: HOSPITAL | Age: 77
Discharge: HOME | End: 2022-01-24
Admitting: INTERNAL MEDICINE

## 2022-01-24 VITALS
SYSTOLIC BLOOD PRESSURE: 130 MMHG | DIASTOLIC BLOOD PRESSURE: 76 MMHG | WEIGHT: 200.18 LBS | HEART RATE: 81 BPM | BODY MASS INDEX: 25.69 KG/M2 | HEIGHT: 74 IN

## 2022-01-24 DIAGNOSIS — R94.31 ABNORMAL ELECTROCARDIOGRAM (ECG) (EKG): ICD-10-CM

## 2022-01-24 DIAGNOSIS — Z79.899 ON AMIODARONE THERAPY: ICD-10-CM

## 2022-01-24 DIAGNOSIS — I48.0 PAROXYSMAL ATRIAL FIBRILLATION: ICD-10-CM

## 2022-01-24 DIAGNOSIS — I10 ESSENTIAL HYPERTENSION: ICD-10-CM

## 2022-01-24 DIAGNOSIS — Z86.73 HISTORY OF CVA (CEREBROVASCULAR ACCIDENT): ICD-10-CM

## 2022-01-24 DIAGNOSIS — R53.83 FATIGUE, UNSPECIFIED TYPE: ICD-10-CM

## 2022-01-24 DIAGNOSIS — R68.89 EXERCISE INTOLERANCE: ICD-10-CM

## 2022-01-24 LAB
BH CV NUCLEAR PRIOR STUDY: 3
BH CV REST NUCLEAR ISOTOPE DOSE: 9.9 MCI
BH CV STRESS BP STAGE 1: NORMAL
BH CV STRESS BP STAGE 2: NORMAL
BH CV STRESS DURATION MIN STAGE 1: 3
BH CV STRESS DURATION MIN STAGE 2: 3
BH CV STRESS DURATION MIN STAGE 3: 0
BH CV STRESS DURATION SEC STAGE 1: 0
BH CV STRESS DURATION SEC STAGE 2: 0
BH CV STRESS DURATION SEC STAGE 3: 28
BH CV STRESS GRADE STAGE 1: 10
BH CV STRESS GRADE STAGE 2: 12
BH CV STRESS GRADE STAGE 3: 14
BH CV STRESS HR STAGE 1: 116
BH CV STRESS HR STAGE 2: 125
BH CV STRESS HR STAGE 3: 146
BH CV STRESS METS STAGE 1: 5
BH CV STRESS METS STAGE 2: 7.5
BH CV STRESS METS STAGE 3: 10
BH CV STRESS NUCLEAR ISOTOPE DOSE: 31 MCI
BH CV STRESS O2 STAGE 1: 99
BH CV STRESS O2 STAGE 2: 98
BH CV STRESS O2 STAGE 3: 98
BH CV STRESS PROTOCOL 1: NORMAL
BH CV STRESS RECOVERY BP: NORMAL MMHG
BH CV STRESS RECOVERY HR: 81 BPM
BH CV STRESS RECOVERY O2: 99 %
BH CV STRESS SPEED STAGE 1: 1.7
BH CV STRESS SPEED STAGE 2: 2.5
BH CV STRESS SPEED STAGE 3: 3.4
BH CV STRESS STAGE 1: 1
BH CV STRESS STAGE 2: 2
BH CV STRESS STAGE 3: 3
LV EF NUC BP: 66 %
MAXIMAL PREDICTED HEART RATE: 144 BPM
PERCENT MAX PREDICTED HR: 111.11 %
STRESS BASELINE BP: NORMAL MMHG
STRESS BASELINE HR: 81 BPM
STRESS O2 SAT REST: 98 %
STRESS PERCENT HR: 131 %
STRESS POST ESTIMATED WORKLOAD: 7.6 METS
STRESS POST EXERCISE DUR MIN: 6 MIN
STRESS POST EXERCISE DUR SEC: 28 SEC
STRESS POST O2 SAT PEAK: 98 %
STRESS POST PEAK BP: NORMAL MMHG
STRESS POST PEAK HR: 160 BPM
STRESS TARGET HR: 122 BPM

## 2022-01-24 PROCEDURE — 93018 CV STRESS TEST I&R ONLY: CPT | Performed by: INTERNAL MEDICINE

## 2022-01-24 PROCEDURE — A9500 TC99M SESTAMIBI: HCPCS | Performed by: INTERNAL MEDICINE

## 2022-01-24 PROCEDURE — 78452 HT MUSCLE IMAGE SPECT MULT: CPT

## 2022-01-24 PROCEDURE — 78452 HT MUSCLE IMAGE SPECT MULT: CPT | Performed by: INTERNAL MEDICINE

## 2022-01-24 PROCEDURE — 93017 CV STRESS TEST TRACING ONLY: CPT

## 2022-01-24 PROCEDURE — 0 TECHNETIUM SESTAMIBI: Performed by: INTERNAL MEDICINE

## 2022-01-24 RX ADMIN — TECHNETIUM TC 99M SESTAMIBI 1 DOSE: 1 INJECTION INTRAVENOUS at 10:52

## 2022-01-24 RX ADMIN — TECHNETIUM TC 99M SESTAMIBI 1 DOSE: 1 INJECTION INTRAVENOUS at 07:40

## 2022-01-28 ENCOUNTER — CLINICAL SUPPORT (OUTPATIENT)
Dept: CARDIOLOGY | Facility: CLINIC | Age: 77
End: 2022-01-28

## 2022-01-28 DIAGNOSIS — I48.91 ATRIAL FIBRILLATION, UNSPECIFIED TYPE: Primary | ICD-10-CM

## 2022-01-28 DIAGNOSIS — I48.92 ATRIAL FLUTTER, UNSPECIFIED TYPE: Primary | ICD-10-CM

## 2022-01-28 PROCEDURE — 93000 ELECTROCARDIOGRAM COMPLETE: CPT | Performed by: INTERNAL MEDICINE

## 2022-02-01 ENCOUNTER — OFFICE VISIT (OUTPATIENT)
Dept: NEUROLOGY | Facility: CLINIC | Age: 77
End: 2022-02-01

## 2022-02-01 VITALS
WEIGHT: 203 LBS | SYSTOLIC BLOOD PRESSURE: 140 MMHG | TEMPERATURE: 96.9 F | HEART RATE: 47 BPM | DIASTOLIC BLOOD PRESSURE: 90 MMHG | OXYGEN SATURATION: 98 % | HEIGHT: 74 IN | BODY MASS INDEX: 26.05 KG/M2

## 2022-02-01 DIAGNOSIS — I48.0 PAROXYSMAL ATRIAL FIBRILLATION WITH RAPID VENTRICULAR RESPONSE: ICD-10-CM

## 2022-02-01 DIAGNOSIS — I63.9 ACUTE CVA (CEREBROVASCULAR ACCIDENT): Primary | ICD-10-CM

## 2022-02-01 PROCEDURE — 99214 OFFICE O/P EST MOD 30 MIN: CPT | Performed by: CLINICAL NURSE SPECIALIST

## 2022-02-01 NOTE — PROGRESS NOTES
New Patient Office Visit      Encounter Date: 2022   Patient Name: Torsten Jackson  : 1945   MRN: [id number]   PCP: Lauren Shah MD    Referring Provider: No ref. provider found     Chief Complaint:    Chief Complaint   Patient presents with   • Stroke     hospital follow up        History of Present Illness: Torsten Jackson is a 76 y.o. male who is here today to establish care.  Patient presents with his wife in follow-up from hospitalization at [facility] in 2021 for a multifocal embolic right and left infarcts.  He was noted to be in new onset A. fib at the time.  His medical diagnoses include hypertension, BPH, glaucoma, hyperlipidemia, and A. fib.  He was discharged on Eliquis 5 mg twice daily and aspirin 81 mg daily, along with Lipitor 40 mg nightly.  His presenting symptoms were transient episodes of dysarthria and aphasia, he did present within the window for IV TPA and he did receive thrombolytic therapy.  His CTA and perfusion did not show any large vessel occlusion and therefore he was not a candidate for the Cath Lab for mechanical thrombectomy.    Since discharge patient has been doing well his speech is about 95% back to baseline.  He says occasionally if he rushes his words he still has word finding issues but other than that seems back to normal.  He has had close follow-up with cardiology and recently had a stress test that showed increase in PVCs.  He has follow-up with an EP physician in a few weeks to decide if any intervention will be needed.  He continues to take Eliquis and aspirin and denies any signs and symptoms of bleeding.  He is back to working out and running, at a lower speed.    Stroke Risk Factors: atrial fibrillation, hyperlipidemia and hypertension  MRS score 0    Subjective      Review of Systems:   Review of Systems   Constitutional: Negative.    HENT: Negative.    Eyes: Negative.    Respiratory: Negative.    Cardiovascular: Positive  for palpitations.       Past Medical History:   Past Medical History:   Diagnosis Date   • Atrial fibrillation (HCC)    • Blood pressure elevated without history of HTN    • GERD (gastroesophageal reflux disease)    • Glaucoma    • Stroke (HCC)        Past Surgical History:   Past Surgical History:   Procedure Laterality Date   • CATARACT EXTRACTION, BILATERAL     • CYST REMOVAL     • HERNIA REPAIR         Family History:   Family History   Problem Relation Age of Onset   • Hypertension Mother    • Prostate cancer Father    • Atrial fibrillation Father 80   • No Known Problems Sister    • Prostate cancer Brother    • Thyroid disease Brother        Social History:   Social History     Socioeconomic History   • Marital status:    Tobacco Use   • Smoking status: Never Smoker   • Smokeless tobacco: Never Used   Vaping Use   • Vaping Use: Never used   Substance and Sexual Activity   • Alcohol use: Yes     Alcohol/week: 2.0 standard drinks     Types: 2 Cans of beer per week     Comment: 2-3 SERVINGS A WEEK   • Drug use: Never   • Sexual activity: Defer       Medications:     Current Outpatient Medications:   •  amiodarone (PACERONE) 200 MG tablet, Take 1 tablet by mouth Daily. (Patient taking differently: Take 200 mg by mouth 2 (Two) Times a Day.), Disp: 90 tablet, Rfl: 1  •  apixaban (ELIQUIS) 5 MG tablet tablet, Take 1 tablet by mouth Every 12 (Twelve) Hours., Disp: 60 tablet, Rfl: 2  •  aspirin 81 MG EC tablet, Take 1 tablet by mouth Daily., Disp: 60 tablet, Rfl: 2  •  atorvastatin (LIPITOR) 40 MG tablet, Take 1 tablet by mouth Every Night., Disp: 90 tablet, Rfl: 2  •  famotidine (PEPCID) 20 MG tablet, Take 1 tablet by mouth Daily., Disp: 90 tablet, Rfl: 1  •  multivitamin (MULTIPLE VITAMIN PO), Take 1 tablet by mouth Daily., Disp: , Rfl:   •  valsartan (DIOVAN) 80 MG tablet, Take 1 tablet by mouth Daily., Disp: 90 tablet, Rfl: 2    Allergies:   No Known Allergies    Objective     Physical Exam:  Vital Signs:  "  Vitals:    02/01/22 1331   BP: 140/90   Pulse: (!) 47   Temp: 96.9 °F (36.1 °C)   SpO2: 98%   Weight: 92.1 kg (203 lb)   Height: 189.2 cm (74.49\")     Body mass index is 25.72 kg/m².     Physical Exam  Vitals reviewed.   Constitutional:       Appearance: Normal appearance.   HENT:      Head: Normocephalic.      Mouth/Throat:      Mouth: Mucous membranes are dry.   Eyes:      Extraocular Movements: Extraocular movements intact.      Pupils: Pupils are equal, round, and reactive to light.   Cardiovascular:      Rate and Rhythm: Normal rate and regular rhythm.      Heart sounds: Normal heart sounds.   Pulmonary:      Effort: Pulmonary effort is normal.      Breath sounds: Normal breath sounds.   Skin:     General: Skin is warm and dry.   Neurological:      General: No focal deficit present.      Deep Tendon Reflexes: Strength normal.      Reflex Scores:       Bicep reflexes are 1+ on the right side and 1+ on the left side.       Brachioradialis reflexes are 1+ on the right side and 1+ on the left side.       Patellar reflexes are 1+ on the right side and 1+ on the left side.  Psychiatric:         Mood and Affect: Mood normal.         Speech: Speech normal.         Neurological Exam  Mental Status  Awake, alert and oriented to person, place and time. Speech is normal. Language is fluent with no aphasia. Attention and concentration are normal. Fund of knowledge is appropriate for level of education.    Cranial Nerves  CN II: Visual fields full to confrontation.  CN III, IV, VI: Extraocular movements intact bilaterally. Pupils equal round and reactive to light bilaterally.  CN V: Facial sensation is normal.  CN VII: Full and symmetric facial movement.  CN VIII: Hearing appears intact.  CN IX, X: Palate elevates symmetrically  CN XI: Shoulder shrug strength is normal.  CN XII: Tongue midline without atrophy or fasciculations.    Motor   Strength is 5/5 throughout all four extremities.    Sensory  Sensation is intact to " light touch, pinprick, vibration and proprioception in all four extremities.    Reflexes                                           Right                      Left  Brachioradialis                    1+                         1+  Biceps                                 1+                         1+  Patellar                                1+                         1+    Coordination  No obvious dysmetria.    Gait  Normal casual, toe, heel and tandem gait.       Imaging Reviewed: 11/2021 MRI brain shows right frontal and left temporal embolic infarcts  11/2021 CTA head neck shows bilateral M2 M3 stenosis  Carotid ultrasound bilateral 0 to 49%  TCD was negative for bubble study  TTE also negative for bubble study, EF of 55%  MARISELA does not show any thrombus    Laboratory Results: LDL of 98, hemoglobin A1c 5.20 ALT 19, AST 23    Assessment / Plan      Assessment/Plan:   Diagnoses and all orders for this visit:    1. 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  Patient was found to be in new A. fib prior to discharge from hospital.  He was started on Eliquis 5 mg twice daily and continues that medication.  He has had follow-up with cardiology who performed stress test and noted increase in PVCs.  Plans to follow-up with EP physician to see if an intervention is needed.  He and his wife monitor his blood pressure as needed.  Aspirin 81 mg daily.    2. PAF with rapid ventricular response. New diagnosis 11/11/2021 (HCC)-continue Eliquis 5 mg twice daily.    3.  Hyperlipidemia-patient's LDL was 98 in the hospital, he continues to take atorvastatin nightly with no complaints.  He will follow-up with primary care for monitoring of liver enzymes.    Plan was discussed with patient and wife.  We discussed secondary stroke prevention, including blood pressure control, attempting to maintain LDL less than 70, and continuing Eliquis twice daily and aspirin daily  lifelong.  All questions answered.  Patient and wife do not feel the need for any follow-up visits, will continue to work with cardiology and primary care.  They will call our office with any questions or concerns.      Stroke Plan:    Stroke Education   Blood Pressure control to < 130/80   Goal LDL <70-recommend high dose statins   Call 911 for stroke any stroke symptoms    Follow Up:   Patient can follow-up on an as-needed basis    PORTER Loredo  [facility] [place] [facility]

## 2022-02-04 NOTE — PROGRESS NOTES
[facility] at [facility] - Office Note  Torsten Jackson         [address] [address]  1945   [phone] (home)        Location:  [facility] office.  Visit Type: Consult.    02/08/22     PCP:  Lauren Shah MD   Referring Provider: Dr. Ratliff    Identification:  Torsten Jackson is a 76 y.o.  male       Chief Complaint: Consult for Atrial Flutter    Problem List:  1. Atrial Flutter   A.  Diagnosed November 2021 during admission for CVA- Eliquis, ASA, and Multaq.  Multaq was not available so Amiodarone was initiated.    B.  CHADS2 Vasc = 5, on eliquis   C.  Echo November 2021: LVEF 0.60. TR with RVSP 25 mmHg. Mild concentric LVH. Grade II w/high LAP LV pseudonormalization. Moderate aortic valve sclerosis.   D.  Exertional dyspnea and fatigue of NYHA class II severity, noticed starting January 2021   E.  Nuclear Stress Test 01/24/2022: normal myocardial perfusion study, no evidence of ischemia. EF 66%. Test was ended due to increased PVCs vs abherrancy after reaching THR.  2. Ischemic Stroke   A.  Manifested as expressive aphasia, November 2021   B.  [facility] 5-day admission for acute embolic CVA, November 2021. Received tPA.   C.  He had an acceptable MARISELA during admission with negative bubble study, acceptable transcranial doppler.    3. Essential Hypertension  4. Glaucoma  5. BPH      No Known Allergies      Current Outpatient Medications:   •  amiodarone (PACERONE) 200 MG tablet, Take 1 tablet by mouth Daily. (Patient taking differently: Take 200 mg by mouth 2 (Two) Times a Day.), Disp: 90 tablet, Rfl: 1  •  apixaban (ELIQUIS) 5 MG tablet tablet, Take 1 tablet by mouth Every 12 (Twelve) Hours., Disp: 60 tablet, Rfl: 2  •  aspirin 81 MG EC tablet, Take 1 tablet by mouth Daily., Disp: 60 tablet, Rfl: 2  •  atorvastatin (LIPITOR) 40 MG tablet, Take 1 tablet by mouth Every Night., Disp: 90 tablet, Rfl: 2  •  famotidine (PEPCID) 20 MG tablet, Take 1 tablet by mouth Daily.,  "Disp: 90 tablet, Rfl: 1  •  multivitamin (MULTIPLE VITAMIN PO), Take 1 tablet by mouth Daily., Disp: , Rfl:   •  valsartan (DIOVAN) 80 MG tablet, Take 1 tablet by mouth Daily., Disp: 90 tablet, Rfl: 2    HPI  Torsten Jackson is a 76 y.o. patient referred to the EP clinic from Dr. Ratliff for consultation on atrial fibrillation/ atrial flutter. He was hospitalized in November 2021 for an ischemic stroke and developed a fib during that admission, he was sent home on amiodarone, eliquis, and ASA 81 mg.  He reports compliance with medication and no issues of unsteady gait, no skin discoloration, no bleeding or bruising issues, no change in bowel habits.  At time of discharge, he was encouraged to recover some but given the okay to resume running - a lifelong activity of his.  He reports that in 2021, he started noticing that he couldn't quite run as far or as intense as he was used to secondary to fatigue.  Sometimes he would start a run slower and be able to finish strong, other times, he just couldn't finish a full run without tiring.  He denies chest pain, denies orthopnea/PND, denies pedal edema.  Since the stroke, the episodes of not being able to do things have been more frequent.  He underwent a stress test - no evidence of ischemia, normal EF.  The study was stopped secondary to \"PVCs\" not due to fatigue or symptoms.  He has also undergone a nocturnal pulse oximetry which is normal.  There is no family history of CAD, arrhythmias or cardiac issues.          The following portions of the patient's history were reviewed in the chart and updated as appropriate: allergies, current medications, past family history, past medical history, past social history, past surgical history and problem list.    Review of Systems   Constitutional: Positive for malaise/fatigue. Negative for weight gain and weight loss.   Cardiovascular: Negative for chest pain, dyspnea on exertion, irregular heartbeat, leg swelling, near-syncope " "and palpitations.   Respiratory: Positive for shortness of breath. Negative for sleep disturbances due to breathing and wheezing.    Hematologic/Lymphatic: Negative for bleeding problem. Does not bruise/bleed easily.   Musculoskeletal: Negative for falls.   Gastrointestinal: Negative for change in bowel habit.   Genitourinary: Negative for dysuria.   Neurological: Negative for dizziness, headaches and light-headedness.   All other systems reviewed and are negative.            height is 189.2 cm (74.5\") and weight is 91.2 kg (201 lb). His blood pressure is 140/78 and his pulse is 47 (abnormal). His oxygen saturation is 96%.   Vitals and nursing note reviewed.   Constitutional:       Appearance: Normal appearance. Well-developed.   Pulmonary:      Effort: Pulmonary effort is normal.      Breath sounds: Normal breath sounds. No wheezing. No rhonchi. No rales.   Cardiovascular:      Normal rate. Occasional ectopic beats. Regular rhythm.   Pulses:     Intact distal pulses.   Edema:     Peripheral edema absent.   Abdominal:      General: Bowel sounds are normal.      Palpations: Abdomen is soft.   Neurological:      Mental Status: Alert.             ECG 12 Lead    Date/Time: 2/8/2022 12:20 PM  Performed by: Jolly Damon PA  Authorized by: Jolly Damon PA   Comparison: compared with previous ECG from 1/28/2022  Similar to previous ECG  Rhythm: atrial flutter  Rate: normal  QRS axis: normal    Clinical impression: abnormal EKG             Assessment/ Plan   Diagnoses and all orders for this visit:    1. Atrial flutter with controlled response (HCC) (Primary):  77 yo CM presents to [facility] with expressive aphasia, noted to be in NSR at the time, diagnosed with embolic CVA and given tPA.  Aphasia resolved.  Noted to have atrial flutter during hospital course and at time of discharge, was sent home on ASA 81mg daily, Eliquis 5mg BID and oral amiodarone 200mg BID.  Since follow up, his EKGs have consistently shown atrial " flutter with controlled rates.  Stress test shows no evidence of ischemia, Normal EF.  Nocturnal oximetry was normal.  EKG performed and reviewed today confirms Atrial Flutter, HR controlled.  Stress test strips reviewed - possible PVCs vs 1:1 flutter.  Dr. Gonzales had a long discussion with Mr. Jackson and his wife regarding continued treatment options with medicine vs ablation.  At this time, will discontinue Amiodarone completely and schedule him for flutter ablation.   Hold Eliquis the night before.  Further recommendations based on outcomes.    2. 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  Continue NOAC, continue statin, continue with BP management, continue activities as tolerated.    3. Essential hypertension:  Fairly controlled.  Continue current medical regimen and follow routinely with Dr. Ratliff.    Jolly Damon PA-C working with Dr. Gonzales    I, Marty Gonzales MD, personally performed the services described in this documentation as scribed by the above named individual in my presence, and it is both accurate and complete.  2/8/2022  15:17 EST

## 2022-02-08 ENCOUNTER — OFFICE VISIT (OUTPATIENT)
Dept: CARDIOLOGY | Facility: CLINIC | Age: 77
End: 2022-02-08

## 2022-02-08 VITALS
HEIGHT: 75 IN | DIASTOLIC BLOOD PRESSURE: 78 MMHG | HEART RATE: 47 BPM | OXYGEN SATURATION: 96 % | BODY MASS INDEX: 24.99 KG/M2 | WEIGHT: 201 LBS | SYSTOLIC BLOOD PRESSURE: 140 MMHG

## 2022-02-08 DIAGNOSIS — I63.9 ACUTE CVA (CEREBROVASCULAR ACCIDENT): ICD-10-CM

## 2022-02-08 DIAGNOSIS — I10 ESSENTIAL HYPERTENSION: ICD-10-CM

## 2022-02-08 DIAGNOSIS — I48.92 ATRIAL FLUTTER WITH CONTROLLED RESPONSE: Primary | ICD-10-CM

## 2022-02-08 PROCEDURE — 99214 OFFICE O/P EST MOD 30 MIN: CPT | Performed by: STUDENT IN AN ORGANIZED HEALTH CARE EDUCATION/TRAINING PROGRAM

## 2022-02-08 PROCEDURE — 93000 ELECTROCARDIOGRAM COMPLETE: CPT | Performed by: PHYSICIAN ASSISTANT

## 2022-02-22 ENCOUNTER — CALL CENTER PROGRAMS (OUTPATIENT)
Dept: CALL CENTER | Facility: HOSPITAL | Age: 77
End: 2022-02-22

## 2022-02-23 ENCOUNTER — PREP FOR SURGERY (OUTPATIENT)
Dept: OTHER | Facility: HOSPITAL | Age: 77
End: 2022-02-23

## 2022-02-23 DIAGNOSIS — I48.92 ATRIAL FLUTTER WITH CONTROLLED RESPONSE: Primary | ICD-10-CM

## 2022-02-23 RX ORDER — NITROGLYCERIN 0.4 MG/1
0.4 TABLET SUBLINGUAL
Status: CANCELLED | OUTPATIENT
Start: 2022-02-23

## 2022-02-23 RX ORDER — SODIUM CHLORIDE 0.9 % (FLUSH) 0.9 %
3 SYRINGE (ML) INJECTION EVERY 12 HOURS SCHEDULED
Status: CANCELLED | OUTPATIENT
Start: 2022-02-23

## 2022-02-23 RX ORDER — SODIUM CHLORIDE 0.9 % (FLUSH) 0.9 %
10 SYRINGE (ML) INJECTION AS NEEDED
Status: CANCELLED | OUTPATIENT
Start: 2022-02-23

## 2022-02-23 RX ORDER — ACETAMINOPHEN 325 MG/1
650 TABLET ORAL EVERY 4 HOURS PRN
Status: CANCELLED | OUTPATIENT
Start: 2022-02-23

## 2022-02-25 ENCOUNTER — PRE-ADMISSION TESTING (OUTPATIENT)
Dept: PREADMISSION TESTING | Facility: HOSPITAL | Age: 77
End: 2022-02-25

## 2022-02-25 DIAGNOSIS — I48.92 ATRIAL FLUTTER WITH CONTROLLED RESPONSE: ICD-10-CM

## 2022-02-25 LAB
ANION GAP SERPL CALCULATED.3IONS-SCNC: 6 MMOL/L (ref 5–15)
BUN SERPL-MCNC: 17 MG/DL (ref 8–23)
BUN/CREAT SERPL: 15.5 (ref 7–25)
CALCIUM SPEC-SCNC: 9.2 MG/DL (ref 8.6–10.5)
CHLORIDE SERPL-SCNC: 104 MMOL/L (ref 98–107)
CO2 SERPL-SCNC: 28 MMOL/L (ref 22–29)
CREAT SERPL-MCNC: 1.1 MG/DL (ref 0.76–1.27)
DEPRECATED RDW RBC AUTO: 40.4 FL (ref 37–54)
ERYTHROCYTE [DISTWIDTH] IN BLOOD BY AUTOMATED COUNT: 12.3 % (ref 12.3–15.4)
GFR SERPL CREATININE-BSD FRML MDRD: 65 ML/MIN/1.73
GLUCOSE SERPL-MCNC: 117 MG/DL (ref 65–99)
HBA1C MFR BLD: 5.3 % (ref 4.8–5.6)
HCT VFR BLD AUTO: 39.3 % (ref 37.5–51)
HGB BLD-MCNC: 13.4 G/DL (ref 13–17.7)
MCH RBC QN AUTO: 30.6 PG (ref 26.6–33)
MCHC RBC AUTO-ENTMCNC: 34.1 G/DL (ref 31.5–35.7)
MCV RBC AUTO: 89.7 FL (ref 79–97)
PLATELET # BLD AUTO: 193 10*3/MM3 (ref 140–450)
PMV BLD AUTO: 10.5 FL (ref 6–12)
POTASSIUM SERPL-SCNC: 4.9 MMOL/L (ref 3.5–5.2)
RBC # BLD AUTO: 4.38 10*6/MM3 (ref 4.14–5.8)
SODIUM SERPL-SCNC: 138 MMOL/L (ref 136–145)
WBC NRBC COR # BLD: 6.19 10*3/MM3 (ref 3.4–10.8)

## 2022-02-25 PROCEDURE — 83036 HEMOGLOBIN GLYCOSYLATED A1C: CPT | Performed by: NURSE PRACTITIONER

## 2022-02-25 PROCEDURE — 36415 COLL VENOUS BLD VENIPUNCTURE: CPT

## 2022-02-25 PROCEDURE — 85027 COMPLETE CBC AUTOMATED: CPT

## 2022-02-25 PROCEDURE — 80048 BASIC METABOLIC PNL TOTAL CA: CPT

## 2022-03-02 ENCOUNTER — HOSPITAL ENCOUNTER (OUTPATIENT)
Facility: HOSPITAL | Age: 77
Setting detail: HOSPITAL OUTPATIENT SURGERY
Discharge: HOME | End: 2022-03-02
Attending: STUDENT IN AN ORGANIZED HEALTH CARE EDUCATION/TRAINING PROGRAM | Admitting: STUDENT IN AN ORGANIZED HEALTH CARE EDUCATION/TRAINING PROGRAM

## 2022-03-02 VITALS
HEIGHT: 74 IN | OXYGEN SATURATION: 100 % | SYSTOLIC BLOOD PRESSURE: 126 MMHG | HEART RATE: 64 BPM | WEIGHT: 202 LBS | RESPIRATION RATE: 12 BRPM | TEMPERATURE: 97.6 F | DIASTOLIC BLOOD PRESSURE: 78 MMHG | BODY MASS INDEX: 25.93 KG/M2

## 2022-03-02 DIAGNOSIS — I48.92 ATRIAL FLUTTER WITH CONTROLLED RESPONSE: ICD-10-CM

## 2022-03-02 PROCEDURE — 25010000002 ADENOSINE PER 6 MG: Performed by: STUDENT IN AN ORGANIZED HEALTH CARE EDUCATION/TRAINING PROGRAM

## 2022-03-02 PROCEDURE — 93623 PRGRMD STIMJ&PACG IV RX NFS: CPT | Performed by: STUDENT IN AN ORGANIZED HEALTH CARE EDUCATION/TRAINING PROGRAM

## 2022-03-02 PROCEDURE — C1769 GUIDE WIRE: HCPCS | Performed by: STUDENT IN AN ORGANIZED HEALTH CARE EDUCATION/TRAINING PROGRAM

## 2022-03-02 PROCEDURE — C1759 CATH, INTRA ECHOCARDIOGRAPHY: HCPCS | Performed by: STUDENT IN AN ORGANIZED HEALTH CARE EDUCATION/TRAINING PROGRAM

## 2022-03-02 PROCEDURE — C1893 INTRO/SHEATH, FIXED,NON-PEEL: HCPCS | Performed by: STUDENT IN AN ORGANIZED HEALTH CARE EDUCATION/TRAINING PROGRAM

## 2022-03-02 PROCEDURE — C1894 INTRO/SHEATH, NON-LASER: HCPCS | Performed by: STUDENT IN AN ORGANIZED HEALTH CARE EDUCATION/TRAINING PROGRAM

## 2022-03-02 PROCEDURE — 99153 MOD SED SAME PHYS/QHP EA: CPT | Performed by: STUDENT IN AN ORGANIZED HEALTH CARE EDUCATION/TRAINING PROGRAM

## 2022-03-02 PROCEDURE — C1731 CATH, EP, 20 OR MORE ELEC: HCPCS | Performed by: STUDENT IN AN ORGANIZED HEALTH CARE EDUCATION/TRAINING PROGRAM

## 2022-03-02 PROCEDURE — 25010000002 FENTANYL CITRATE (PF) 50 MCG/ML SOLUTION: Performed by: STUDENT IN AN ORGANIZED HEALTH CARE EDUCATION/TRAINING PROGRAM

## 2022-03-02 PROCEDURE — 25010000002 MIDAZOLAM PER 1 MG: Performed by: STUDENT IN AN ORGANIZED HEALTH CARE EDUCATION/TRAINING PROGRAM

## 2022-03-02 PROCEDURE — S0260 H&P FOR SURGERY: HCPCS | Performed by: NURSE PRACTITIONER

## 2022-03-02 PROCEDURE — 0 LIDOCAINE 1 % SOLUTION: Performed by: STUDENT IN AN ORGANIZED HEALTH CARE EDUCATION/TRAINING PROGRAM

## 2022-03-02 PROCEDURE — 93662 INTRACARDIAC ECG (ICE): CPT | Performed by: STUDENT IN AN ORGANIZED HEALTH CARE EDUCATION/TRAINING PROGRAM

## 2022-03-02 PROCEDURE — 25010000002 ONDANSETRON PER 1 MG: Performed by: STUDENT IN AN ORGANIZED HEALTH CARE EDUCATION/TRAINING PROGRAM

## 2022-03-02 PROCEDURE — 93653 COMPRE EP EVAL TX SVT: CPT | Performed by: STUDENT IN AN ORGANIZED HEALTH CARE EDUCATION/TRAINING PROGRAM

## 2022-03-02 PROCEDURE — C1760 CLOSURE DEV, VASC: HCPCS | Performed by: STUDENT IN AN ORGANIZED HEALTH CARE EDUCATION/TRAINING PROGRAM

## 2022-03-02 PROCEDURE — C1766 INTRO/SHEATH,STRBLE,NON-PEEL: HCPCS | Performed by: STUDENT IN AN ORGANIZED HEALTH CARE EDUCATION/TRAINING PROGRAM

## 2022-03-02 PROCEDURE — C1732 CATH, EP, DIAG/ABL, 3D/VECT: HCPCS | Performed by: STUDENT IN AN ORGANIZED HEALTH CARE EDUCATION/TRAINING PROGRAM

## 2022-03-02 PROCEDURE — 99152 MOD SED SAME PHYS/QHP 5/>YRS: CPT | Performed by: STUDENT IN AN ORGANIZED HEALTH CARE EDUCATION/TRAINING PROGRAM

## 2022-03-02 RX ORDER — ACETAMINOPHEN 325 MG/1
650 TABLET ORAL EVERY 4 HOURS PRN
Status: DISCONTINUED | OUTPATIENT
Start: 2022-03-02 | End: 2022-03-02 | Stop reason: HOSPADM

## 2022-03-02 RX ORDER — ADENOSINE 3 MG/ML
INJECTION, SOLUTION INTRAVENOUS AS NEEDED
Status: DISCONTINUED | OUTPATIENT
Start: 2022-03-02 | End: 2022-03-02 | Stop reason: HOSPADM

## 2022-03-02 RX ORDER — IBUPROFEN 200 MG
400 TABLET ORAL EVERY 6 HOURS PRN
Status: DISCONTINUED | OUTPATIENT
Start: 2022-03-02 | End: 2022-03-02 | Stop reason: HOSPADM

## 2022-03-02 RX ORDER — LIDOCAINE HYDROCHLORIDE 10 MG/ML
INJECTION, SOLUTION INFILTRATION; PERINEURAL AS NEEDED
Status: DISCONTINUED | OUTPATIENT
Start: 2022-03-02 | End: 2022-03-02 | Stop reason: HOSPADM

## 2022-03-02 RX ORDER — SODIUM CHLORIDE 0.9 % (FLUSH) 0.9 %
10 SYRINGE (ML) INJECTION AS NEEDED
Status: DISCONTINUED | OUTPATIENT
Start: 2022-03-02 | End: 2022-03-02 | Stop reason: HOSPADM

## 2022-03-02 RX ORDER — MIDAZOLAM HYDROCHLORIDE 1 MG/ML
INJECTION INTRAMUSCULAR; INTRAVENOUS AS NEEDED
Status: DISCONTINUED | OUTPATIENT
Start: 2022-03-02 | End: 2022-03-02 | Stop reason: HOSPADM

## 2022-03-02 RX ORDER — SODIUM CHLORIDE 9 MG/ML
INJECTION, SOLUTION INTRAVENOUS CONTINUOUS PRN
Status: DISCONTINUED | OUTPATIENT
Start: 2022-03-02 | End: 2022-03-02 | Stop reason: HOSPADM

## 2022-03-02 RX ORDER — SODIUM CHLORIDE 0.9 % (FLUSH) 0.9 %
3 SYRINGE (ML) INJECTION EVERY 12 HOURS SCHEDULED
Status: DISCONTINUED | OUTPATIENT
Start: 2022-03-02 | End: 2022-03-02 | Stop reason: HOSPADM

## 2022-03-02 RX ORDER — NITROGLYCERIN 0.4 MG/1
0.4 TABLET SUBLINGUAL
Status: DISCONTINUED | OUTPATIENT
Start: 2022-03-02 | End: 2022-03-02 | Stop reason: HOSPADM

## 2022-03-02 RX ORDER — ONDANSETRON 2 MG/ML
INJECTION INTRAMUSCULAR; INTRAVENOUS AS NEEDED
Status: DISCONTINUED | OUTPATIENT
Start: 2022-03-02 | End: 2022-03-02 | Stop reason: HOSPADM

## 2022-03-02 RX ORDER — FENTANYL CITRATE 50 UG/ML
INJECTION, SOLUTION INTRAMUSCULAR; INTRAVENOUS AS NEEDED
Status: DISCONTINUED | OUTPATIENT
Start: 2022-03-02 | End: 2022-03-02 | Stop reason: HOSPADM

## 2022-03-02 RX ORDER — ACETAMINOPHEN 650 MG/1
650 SUPPOSITORY RECTAL EVERY 4 HOURS PRN
Status: DISCONTINUED | OUTPATIENT
Start: 2022-03-02 | End: 2022-03-02 | Stop reason: HOSPADM

## 2022-03-02 NOTE — H&P
Cardiology H&P     Torsten Jackson  1945  [phone]    03/02/22    DATE OF ADMISSION: 3/2/2022  [facility]    Lauren Shah MD  [address] [address]  Referring Provider: Marty Gonzales MD     CC: atrial flutter    Problem List:  1. Atrial Flutter        A.  Diagnosed November 2021 during admission for CVA- Eliquis, ASA, and Multaq.  Multaq was not available so Amiodarone was initiated.         B.  CHADS2 Vasc = 5, on eliquis        C.  Echo November 2021: LVEF 0.60. TR with RVSP 25 mmHg. Mild concentric LVH. Grade II w/high LAP LV pseudonormalization. Moderate aortic valve sclerosis.        D.  Exertional dyspnea and fatigue of NYHA class II severity, noticed starting January 2021        E.  Nuclear Stress Test 01/24/2022: normal myocardial perfusion study, no evidence of ischemia. EF 66%. Test was ended due to increased PVCs vs abherrancy after reaching THR.  2. Ischemic Stroke        A.  Manifested as expressive aphasia, November 2021              B.  [facility] 5-day admission for acute embolic CVA, November 2021. Received tPA.              C.  He had an acceptable MARISELA during admission with negative bubble study, acceptable transcranial doppler.    3. Essential Hypertension  4. Glaucoma  5. BPH       History of Present Illness:   Torsten Jackson is a 76 y.o. patient presents today for a atrial flutter ablation. He was hospitalized in 11/2021 for an ischemic stroke and developed atrial flutter during that admission, he was given TPA and now has no residual stroke symptoms. He was sent home on amiodarone, eliquis, and ASA 81 mg. He notes that earlier in 2021, he started noticing that he couldn't run as far as usual, fatiguing earlier and easier. He continues to have fatigue with exercise but is able to complete activities of daily living without fatigue. He is eager to get back to running and more intense exercises.   Today he denies chest pain, shortness of breath,  lower extremity edema.  Since his last office visit he denies any hospitalizations, ER visits, new medications, new diagnoses, stroke-like symptoms, illnesses or infections.  He stopped his amiodarone on 2/8/2022 after his office visit with Dr. Gonzales.  Last dose of Eliquis was yesterday 3/1/22 AM dose.    No Known Allergies    Prior to Admission Medications     Prescriptions Last Dose Informant Patient Reported? Taking?    apixaban (ELIQUIS) 5 MG tablet tablet 3/1/2022 Medication Bottle No Yes    Take 1 tablet by mouth Every 12 (Twelve) Hours.    Patient taking differently:  Take 5 mg by mouth Every 12 (Twelve) Hours. Last dose per Dr. Gonzales's orders 3/1/22 morning  Indications: Other - full anticoagulation, Visible clot on MRI    aspirin 81 MG EC tablet 3/2/2022 Medication Bottle No Yes    Take 1 tablet by mouth Daily.    atorvastatin (LIPITOR) 40 MG tablet 3/1/2022 Medication Bottle No Yes    Take 1 tablet by mouth Every Night.    famotidine (PEPCID) 20 MG tablet 3/2/2022 Medication Bottle No Yes    Take 1 tablet by mouth Daily.    multivitamin (MULTIPLE VITAMIN PO) 3/1/2022 Medication Bottle Yes Yes    Take 1 tablet by mouth Daily.    valsartan (DIOVAN) 80 MG tablet  Medication Bottle No Yes    Take 1 tablet by mouth Daily.            Current Facility-Administered Medications:   •  acetaminophen (TYLENOL) tablet 650 mg, 650 mg, Oral, Q4H PRN, Savana Gutierrez, APRN  •  nitroglycerin (NITROSTAT) SL tablet 0.4 mg, 0.4 mg, Sublingual, Q5 Min PRN, Savana Gutierrez, PORTER  •  sodium chloride 0.9 % flush 10 mL, 10 mL, Intravenous, PRN, Savana Gutierrez, APRN  •  sodium chloride 0.9 % flush 3 mL, 3 mL, Intravenous, Q12H, Savana Gutierrez, PORTER    Social History     Socioeconomic History   • Marital status:    Tobacco Use   • Smoking status: Never Smoker   • Smokeless tobacco: Never Used   Vaping Use   • Vaping Use: Never used   Substance and Sexual Activity   • Alcohol use: Yes     Alcohol/week: 2.0 standard drinks      "Types: 2 Cans of beer per week     Comment: 2-3 SERVINGS A WEEK   • Drug use: Never   • Sexual activity: Defer       Family History   Problem Relation Age of Onset   • Hypertension Mother    • Prostate cancer Father    • Atrial fibrillation Father 80   • No Known Problems Sister    • Prostate cancer Brother    • Thyroid disease Brother        REVIEW OF SYSTEMS:   CONSTITUTIONAL:         No weight loss, fever, chills, weakness . +fatigue.   HEENT:                            No visual loss, blurred vision, double vision, yellow sclerae.                                             No hearing loss, congestion, sore throat.   SKIN:                                No rashes, urticaria, ulcers, sores.     RESPIRATORY:               No shortness of breath, hemoptysis, cough, sputum.   GI:                                     No anorexia, nausea, vomiting, diarrhea. No abdominal pain, melena.   :                                   No burning on urination, hematuria or increased frequency.  ENDOCRINE:                   No diaphoresis, cold or heat intolerance. No polyuria or polydipsia.   NEURO:                            No headache, dizziness, syncope, paralysis, ataxia, or parasthesias.                                            No change in bowel or bladder control. + history of CVA/TIA  MUSCULOSKELETAL:    No muscle, back pain, joint pain or stiffness.   HEMATOLOGY:               No anemia, bleeding, bruising. No history of DVT/PE.  PSYCH:                            No history of depression, anxiety    Vitals:    03/02/22 0626 03/02/22 0628   BP: 163/99 148/91   BP Location: Right arm Left arm   Patient Position: Sitting Sitting   Pulse:  81   Resp:  16   Temp:  97.6 °F (36.4 °C)   TempSrc:  Temporal   SpO2: 99% 98%   Weight:  91.6 kg (202 lb)   Height:  188 cm (74\")         Vital Sign Min/Max for last 24 hours  Temp  Min: 97.6 °F (36.4 °C)  Max: 97.6 °F (36.4 °C)   BP  Min: 148/91  Max: 163/99   Pulse  Min: 81  Max: 81 "   Resp  Min: 16  Max: 16   SpO2  Min: 98 %  Max: 99 %   No data recorded    No intake or output data in the 24 hours ending 03/02/22 0812          Physical Exam:  GEN: Well nourished, Well- developed  No acute distress  HEENT: Normocephalic, Atraumatic  NECK: supple, NO JVD  CARDIAC: S1S2  RRR no murmur, gallop, rub  LUNGS: Clear to ausculation, normal respiratory effort  ABDOMEN: Soft, nontender, normal bowel sounds  EXTREMITIES:No gross deformities,  No clubbing, cyanosis, or edema  SKIN: Warm, dry  NEURO: No focal deficits  PSYCHIATRIC: Normal affect and mood      I personally viewed and interpreted the patient's EKG/Telemetry/lab data    Data:   Results from last 7 days   Lab Units 02/25/22  0956   WBC 10*3/mm3 6.19   HEMOGLOBIN g/dL 13.4   HEMATOCRIT % 39.3   PLATELETS 10*3/mm3 193     Results from last 7 days   Lab Units 02/25/22  0956   SODIUM mmol/L 138   POTASSIUM mmol/L 4.9   CHLORIDE mmol/L 104   CO2 mmol/L 28.0   BUN mg/dL 17   CREATININE mg/dL 1.10   GLUCOSE mg/dL 117*      Results from last 7 days   Lab Units 02/25/22  0956   HEMOGLOBIN A1C % 5.30                             No intake or output data in the 24 hours ending 03/02/22 0812    Chest X-Ray:  Imaging Results (Last 24 Hours)     ** No results found for the last 24 hours. **          Telemetry: atrial flutter  EKG: no new EKG this AM    Assessment and Plan:   1. Atrial flutter  -Atrial flutter was found during hospital course when he was admitted for ischemic stroke.  He was sent home on 81 mg of aspirin 5 mg Eliquis twice daily and amiodarone 200 mg BID.    - EKGs have continued to show atrial flutter. He continues to have increased fatigue and activity intolerance.  He presents today for atrial flutter ablation.  - Telemetry shows atrial flutter today, rates 80s.  - Last dose of amiodarone was 2/8/2022  - Last dose of Eliquis was 3/1/2022 morning dose.  - Dr. Gonzales reviewed the risks, benefits, and alternatives of this procedure at the office  visit and patient and wife are willing to proceed today.   - [id number]= 5, on Eliquis    2. CVA  - No residual stroke symptoms reported.  - Continue NOAC, statin, BP management    3. Hypertension  - Continue to monitor, slightly elevated this AM prior to procedure. HTN followed by Dr. Ratliff    Electronically signed by PORTER Randle, 03/02/22, 8:23 AM EST.

## 2022-03-03 ENCOUNTER — CALL CENTER PROGRAMS (OUTPATIENT)
Dept: CALL CENTER | Facility: HOSPITAL | Age: 77
End: 2022-03-03

## 2022-03-03 NOTE — OUTREACH NOTE
PCI/Device Survey      Responses   Facility patient discharged from? [facility]   Procedure date 03/02/22   Procedure (if device, specify in description) PCI   PCI site Right,  Left,  Groin   Performing MD Other (annotate)  [Dr. Marty Gonzales]   Attempt successful? Yes   Call start time 1150   Call end time 1153   Has the patient had any of the following symptoms since discharge? --  [None noted]   Is the patient taking prescribed medications: ASA,  Plavix  [Eliquis ]   Nursing intervention Reminded to continue to take prescribed medications   Does the patient have any of the following symptoms related to the cath/surgical site? --  [None noted]   Does the patient have an appointment scheduled with the cardiologist? Yes   Appointment comments Appt with Dr. Ratliff is on 4/15/22,  Appt with Dr. Gonzales is on 6/14/22   Did the patient feel prepared to go home on the same day as the procedure? Yes   Is the patient satisfied with the same day discharge process? Yes   PCI/Device call completed Yes          Tammy Swift RN

## 2022-06-14 ENCOUNTER — OFFICE VISIT (OUTPATIENT)
Dept: CARDIOLOGY | Facility: CLINIC | Age: 77
End: 2022-06-14

## 2022-06-14 VITALS
WEIGHT: 201 LBS | HEIGHT: 75 IN | HEART RATE: 53 BPM | DIASTOLIC BLOOD PRESSURE: 68 MMHG | SYSTOLIC BLOOD PRESSURE: 144 MMHG | BODY MASS INDEX: 24.99 KG/M2 | OXYGEN SATURATION: 98 %

## 2022-06-14 DIAGNOSIS — I48.92 ATRIAL FLUTTER WITH CONTROLLED RESPONSE: Primary | ICD-10-CM

## 2022-06-14 PROCEDURE — 93000 ELECTROCARDIOGRAM COMPLETE: CPT | Performed by: PHYSICIAN ASSISTANT

## 2022-06-14 PROCEDURE — 99214 OFFICE O/P EST MOD 30 MIN: CPT | Performed by: PHYSICIAN ASSISTANT

## 2022-06-14 NOTE — PROGRESS NOTES
[facility] at [facility] - Office Note  Torsten Jackson         [address] [facility] [address]  1945   [phone] (home)        Location:  [place] office.  Visit Type: Consult.    06/14/22     PCP:  Lauren Shah MD   Referring Provider: Dr. Ratliff    Identification:  Torsten Jackson is a 77 y.o.  male       Chief Complaint: Consult for Atrial Flutter    Problem List:  1. Atrial Flutter   A.  Diagnosed November 2021 during admission for CVA- Eliquis, ASA, and Multaq.  Multaq was not available so Amiodarone was initiated.    B.  CHADS2 Vasc = 5, on eliquis   C.  Echo November 2021: LVEF 0.60. TR with RVSP 25 mmHg. Mild concentric LVH. Grade II w/high LAP LV pseudonormalization. Moderate aortic valve sclerosis.    is trace at this point.  Exertional dyspnea and fatigue of NYHA class II severity, noticed starting January 2021   E.  Nuclear Stress Test 01/24/2022: normal myocardial perfusion study, no evidence of ischemia. EF 66%. Test was ended due to increased PVCs vs abherrancy after reaching THR.  F.  EPS and RFA of typical right atrial flutter.  Dr. Gonzales March 2, 2022.  2. Ischemic Stroke   A.  Manifested as expressive aphasia, November 2021   B.  [facility] 5-day admission for acute embolic CVA, November 2021. Received tPA.   C.  He had an acceptable MARISELA during admission with negative bubble study, acceptable transcranial doppler.    3. Essential Hypertension  4. Glaucoma  5. BPH      No Known Allergies      Current Outpatient Medications:   •  apixaban (ELIQUIS) 5 MG tablet tablet, Take 1 tablet by mouth Every 12 (Twelve) Hours. (Patient taking differently: Take 5 mg by mouth Every 12 (Twelve) Hours. Last dose per Dr. Gonzales's orders 3/1/22 morning  Indications: Other - full anticoagulation, Visible clot on MRI), Disp: 60 tablet, Rfl: 2  •  aspirin 81 MG EC tablet, Take 1 tablet by mouth Daily., Disp: 60 tablet, Rfl: 2  •  atorvastatin (LIPITOR) 40 MG tablet, Take 1 tablet by  "mouth Every Night., Disp: 90 tablet, Rfl: 2  •  multivitamin (THERAGRAN) tablet tablet, Take 1 tablet by mouth Daily., Disp: , Rfl:   •  valsartan (DIOVAN) 80 MG tablet, Take 1 tablet by mouth Daily., Disp: 90 tablet, Rfl: 2    Atrial Fibrillation  Symptoms include shortness of breath. Symptoms are negative for chest pain, dizziness and palpitations. Past medical history includes atrial fibrillation.     Torsten Jackson is a 76 y.o. patient presents for follow-up of atrial flutter.  He is status post Sissel EP study and RFA of typical right atrial isthmus dependent flutter ablation.  He tolerated procedure very well.  He states he has not any episodes of complications suggesting recurrent atrial flutter.  He did have an episode mowing his yard June 2 where is quite hot outside he felt his heart rate go fast.  He presented to his PCP and was told his heart was elevated but it gradually declined.  There was no EKG after this event.  Otherwise she states he been doing well he said no recurrent events since then.  He is been trying to monitor his physical activity level and be careful with working outside in the heat.  He has had no chest pain chest tightness.  No dizziness near syncope or syncope.  No heart failure symptoms.              height is 189.2 cm (74.5\") and weight is 91.2 kg (201 lb). His blood pressure is 144/68 and his pulse is 53. His oxygen saturation is 98%.   Vitals and nursing note reviewed.   Constitutional:       Appearance: Normal appearance. Well-developed.   Pulmonary:      Effort: Pulmonary effort is normal.      Breath sounds: Normal breath sounds. No wheezing. No rhonchi. No rales.   Cardiovascular:      Normal rate. Occasional ectopic beats. Regular rhythm.   Pulses:     Intact distal pulses.   Edema:     Peripheral edema absent.   Abdominal:      General: Bowel sounds are normal.      Palpations: Abdomen is soft.   Neurological:      Mental Status: Alert.             ECG 12 " Lead    Date/Time: 6/14/2022 4:28 PM  Performed by: Humphrey Mackenzie PA  Authorized by: Humphrey Mackenzie PA   Rate: normal  Conduction: conduction normal  ST Segments: ST segments normal  T Waves: T waves normal  QRS axis: normal    Clinical impression: normal ECG             Assessment/ Plan   Diagnoses and all orders for this visit:    1. Atrial flutter with controlled response (HCC) (Primary): Successful EP study and RFA of typical right atrial dependent atrial flutter March 2, 2022.  EKG today normal sinus rhythm.  No recurrent episodes.    2. 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    3. Essential hypertension:  Fairly controlled.  Continue current medical regimen and follow routinely with Dr. Ratliff.    Stable CV course.  Return follow-up 6 months or sooner as needed.  Electronically signed by MCKAYLA Pena, 06/14/22, 4:29 PM EDT.

## 2022-06-14 NOTE — PROGRESS NOTES
Subjective:     Encounter Date:06/15/2022    Patient ID: Torsten Jackson is a 77 y.o.  white male, semiretired [employer] .    PHYSICIAN: Lauren Shah MD  NEUROLOGIST: Matias Cherry MD  ELECTROPHYSIOLOGIST: Marty Gonzales MD    Chief Complaint:   Chief Complaint   Patient presents with   • History of CVA (cerebrovascular accident)     Problem List:  1. Acute ischemic stroke  a. Status post TIA  b. Manifested as expressive aphasia, November 2021  c. [facility] 5-day admission for acute embolic CVA, November 2021, confirmed by head imaging including CT head, MRI brain, CTA Head, and CT cerebral perfusion. He was administered TPA. He had an acceptable MARISELA during admission with negative bubble study.  He was started on Exforge. He was also diagnosed with atrial fibrillation and he was started on amiodarone 200 mg daily as well as Eliquis and low-dose aspirin.  d. Residual class I symptoms, June 2022  2. Atrial fibrillation  a. Diagnosed during November 2021 admission  b. CHADSVASC=5  c. Initiation of amiodarone and Eliquis  d. Echocardiogram, November 2021: LVEF 0.60. TR with RVSP 25 mmHg. Saline test negative for right to left atrial level shunt. Mild concentric LVH. Grade II w/high LAP LV pseudonormalization. Moderate aortic valve sclerosis. No PFO.  e. Recent exertional dyspnea and fatigue of NYHA class II severity with acceptable Cardiolite GXT (LVEF 0.66), December 2021.  f. Ablation with Dr. Gonzales 03/02/2022: Successful radiofrequency ablation of right atrial isthmus dependent flutter with bidirectional isthmus block post-ablation.  g. Residual class I symptoms, June 2022  3. Labile hypertension- probably essential  4. Glaucoma  5. BPH      No Known Allergies    Current Outpatient Medications:   •  apixaban (ELIQUIS) 5 MG tablet tablet, Take 1 tablet by mouth Every 12 (Twelve) Hours. (Patient taking differently: Take 5 mg by mouth Every 12 (Twelve) Hours. Last dose  "per Dr. Gonzales's orders 3/1/22 morning  Indications: Other - full anticoagulation, Visible clot on MRI), Disp: 60 tablet, Rfl: 2  •  aspirin 81 MG EC tablet, Take 1 tablet by mouth Daily., Disp: 60 tablet, Rfl: 2  •  atorvastatin (LIPITOR) 40 MG tablet, Take 1 tablet by mouth Every Night., Disp: 90 tablet, Rfl: 2  •  multivitamin (THERAGRAN) tablet tablet, Take 1 tablet by mouth Daily., Disp: , Rfl:   •  valsartan (DIOVAN) 80 MG tablet, Take 1 tablet by mouth Daily., Disp: 90 tablet, Rfl: 2    History of Present Illness: Patient returns after 6-month hiatus for follow up. Since last visit, patient had a 7-hour [facility] admission, 03/02/2022, for atrial flutter ablation with Dr. Gonzales. No medication changes were made at this admission. He reports that his heart rate ranges from 48-54 BPM in the morning. He can do his daily exercise without cardiopulmonary complaints. He also reports that he recovers quickly from his runs. 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 He monitors his blood pressure at home, and it's typically around 130/72. Patient denies chest pain, shortness of breath, orthopnea, palpitations, edema, dizziness, and syncope.  He has had no additional interim ER visits, hospitalizations, serious illnesses, or surgeries.     ROS   Obtained and negative except as outlined in problem list and HPI.    Procedures       Objective:       Vitals:    06/15/22 1502 06/15/22 1504 06/15/22 1514   BP: 150/71 143/76 134/62   BP Location: Left arm Left arm Left arm   Patient Position: Sitting Standing Sitting   Cuff Size: Adult Adult Adult   Pulse: 61 65 61   SpO2: 99% 99%    Weight: 90.7 kg (200 lb)     Height: 189.2 cm (74.5\")       Body mass index is 25.33 kg/m².  Last weight: 199 lbs    Vitals reviewed.   Constitutional:       Appearance: Well-developed.   Neck:      Thyroid: No thyromegaly.      Vascular: No " carotid bruit or JVD.      Lymphadenopathy: No cervical adenopathy.   Pulmonary:      Effort: Pulmonary effort is normal.      Breath sounds: Normal breath sounds. No wheezing. No rhonchi. No rales.   Cardiovascular:      Regular rhythm.      Murmurs: There is a grade 1/6 mid frequency early systolic murmur at the URSB.      No gallop. No S3 gallop.   Pulses:     Dorsalis pedis: 2+ bilaterally.     Posterior tibial: 2+ bilaterally.  Edema:     Peripheral edema absent.   Abdominal:      Palpations: Abdomen is soft. There is no abdominal mass.      Tenderness: There is no abdominal tenderness.   Musculoskeletal: Normal range of motion. Skin:     General: Skin is warm and dry.      Findings: No rash.   Neurological:      Mental Status: Alert and oriented to person, place, and time.           Lab Review:   Lab Results   Component Value Date    GLUCOSE 117 (H) 02/25/2022    BUN 17 02/25/2022    CREATININE 1.10 02/25/2022    EGFRIFNONA 65 02/25/2022    BCR 15.5 02/25/2022     02/25/2022    K 4.9 02/25/2022     02/25/2022    CO2 28.0 02/25/2022    CALCIUM 9.2 02/25/2022    ALBUMIN 4.30 11/08/2021    AST 23 11/08/2021    AST 26 11/08/2021    ALT 19 11/08/2021    ALT 19 11/08/2021       Lab Results   Component Value Date    WBC 6.19 02/25/2022    HGB 13.4 02/25/2022    HCT 39.3 02/25/2022    MCV 89.7 02/25/2022     02/25/2022       Lab Results   Component Value Date    HGBA1C 5.30 02/25/2022       Lab Results   Component Value Date    TSH 2.240 11/11/2021       Lab Results   Component Value Date    CHOL 158 11/09/2021     Lab Results   Component Value Date    TRIG 171 (H) 11/09/2021     Lab Results   Component Value Date    HDL 30 (L) 11/09/2021     Lab Results   Component Value Date    LDL 98 11/09/2021     · Stress Test with Myocardial Perfusion One Day, 01/24/2022  · Patient denied any chest discomfort/pain, or any other symptoms during exercise.  · THR of 122/minute acheived at 5:21  minutes.  · Expected exercise duration = 6:10 minutes; actual exercise duration = 6:23 minutes; TERESITA (-4); test was ended due to a dramatic increase of PVCs after the patient reached his THR.  · Left ventricular ejection fraction is normal (calculated EF = 66%); WNL TID = 0.93.  · Raw images reviewed with the following abnormalities noted: mild movement at rest and post stress.  · Myocardial perfusion imaging indicates a normal myocardial perfusion study with no evidence of ischemia.  · Impressions are consistent with a low risk study.  · There is no prior study available for comparison.  · Low risk for ischemic heart disease.  · Findings consistent with an abnormal ECG stress test.  · Qualitative review of the thoracic CT scan slices demonstrates moderate mitral annular calcification with mild epicardial coronary artery calcification.  · WNL room air oximetry before, during, and after exercise (98-99%).  · Abnormal acceptable quantitative SPECT gated Cardiolite exercise stress test suggestive of low probability for significant focal obstructive coronary artery disease in the absence of anginal type chest discomfort, definitive ischemic electrocardiographic changes, or myocardial perfusion abnormalities with preserved systolic left ventricular function (LVEF 0.66) following exercise to 111% predicted maximum heart rate and 104% predicted exercise capacity.    · Radiofrequency ablation atrial flutter, 03/02/2022  · IMPRESSION: Successful radiofrequency ablation of right atrial isthmus dependent flutter with bidirectional isthmus block post-ablation.        Assessment:       Overall continued acceptable course with no new interim cardiopulmonary complaints with good functional status. We will defer additional diagnostic or therapeutic intervention from a cardiac perspective at this time. He will need reassessment of his FLP with his internist in the near future.     Diagnosis Plan   1. History of CVA (cerebrovascular  accident)  No neurologic deficits.   2. Paroxysmal atrial fibrillation (HCC)  Stable and asymptomatic.   3. Essential hypertension  Adequate control. Continue current treatment.          Plan:         1. Patient to continue current medications and close follow up with the above providers.  2. Tentative cardiology follow up in January 2023 or patient may return sooner PRN.    Scribed for Rony Ratliff MD by Jumana Nicholas. 6/15/2022  15:15 EDT    I, Rony Ratliff MD, [facility], personally performed the services described in this documentation as scribed by the above named individual in my presence, and it is both accurate and complete. At 15:38 EDT on 06/15/2022

## 2022-06-15 ENCOUNTER — OFFICE VISIT (OUTPATIENT)
Dept: CARDIOLOGY | Facility: CLINIC | Age: 77
End: 2022-06-15

## 2022-06-15 VITALS
BODY MASS INDEX: 24.87 KG/M2 | HEART RATE: 61 BPM | OXYGEN SATURATION: 99 % | DIASTOLIC BLOOD PRESSURE: 62 MMHG | SYSTOLIC BLOOD PRESSURE: 134 MMHG | HEIGHT: 75 IN | WEIGHT: 200 LBS

## 2022-06-15 DIAGNOSIS — Z86.73 HISTORY OF CVA (CEREBROVASCULAR ACCIDENT): Primary | ICD-10-CM

## 2022-06-15 DIAGNOSIS — I10 ESSENTIAL HYPERTENSION: ICD-10-CM

## 2022-06-15 DIAGNOSIS — I48.0 PAROXYSMAL ATRIAL FIBRILLATION: ICD-10-CM

## 2022-06-15 PROCEDURE — 99214 OFFICE O/P EST MOD 30 MIN: CPT | Performed by: INTERNAL MEDICINE

## 2023-01-03 ENCOUNTER — OFFICE VISIT (OUTPATIENT)
Dept: CARDIOLOGY | Facility: CLINIC | Age: 78
End: 2023-01-03
Payer: MEDICARE

## 2023-01-03 VITALS
BODY MASS INDEX: 25.8 KG/M2 | HEART RATE: 104 BPM | WEIGHT: 201 LBS | OXYGEN SATURATION: 99 % | HEIGHT: 74 IN | DIASTOLIC BLOOD PRESSURE: 72 MMHG | SYSTOLIC BLOOD PRESSURE: 106 MMHG

## 2023-01-03 DIAGNOSIS — I48.0 PAROXYSMAL ATRIAL FIBRILLATION: Primary | ICD-10-CM

## 2023-01-03 PROBLEM — R97.20 RAISED PROSTATE SPECIFIC ANTIGEN: Status: ACTIVE | Noted: 2017-10-30

## 2023-01-03 PROBLEM — Z00.00 WELL ADULT HEALTH CHECK: Status: ACTIVE | Noted: 2022-12-01

## 2023-01-03 PROBLEM — K21.9 GASTROESOPHAGEAL REFLUX DISEASE WITHOUT ESOPHAGITIS: Status: ACTIVE | Noted: 2022-03-08

## 2023-01-03 PROBLEM — H57.02 ANISOCORIA: Status: ACTIVE | Noted: 2019-11-10

## 2023-01-03 PROBLEM — E78.2 MIXED HYPERLIPIDEMIA: Status: ACTIVE | Noted: 2022-12-01

## 2023-01-03 PROBLEM — D64.9 ANEMIA: Status: ACTIVE | Noted: 2022-12-01

## 2023-01-03 PROBLEM — E66.3 OVERWEIGHT: Status: ACTIVE | Noted: 2022-12-01

## 2023-01-03 PROBLEM — E87.5 HYPERKALEMIA: Status: ACTIVE | Noted: 2019-11-10

## 2023-01-03 PROBLEM — Z86.73 HISTORY OF CEREBROVASCULAR ACCIDENT: Status: ACTIVE | Noted: 2021-11-30

## 2023-01-03 PROCEDURE — 99214 OFFICE O/P EST MOD 30 MIN: CPT | Performed by: PHYSICIAN ASSISTANT

## 2023-01-03 PROCEDURE — 1159F MED LIST DOCD IN RCRD: CPT | Performed by: PHYSICIAN ASSISTANT

## 2023-01-03 PROCEDURE — 1160F RVW MEDS BY RX/DR IN RCRD: CPT | Performed by: PHYSICIAN ASSISTANT

## 2023-01-03 PROCEDURE — 93000 ELECTROCARDIOGRAM COMPLETE: CPT | Performed by: PHYSICIAN ASSISTANT

## 2023-01-03 NOTE — PROGRESS NOTES
[facility] at [facility] - Office Note  Torsten Jackson         [address] [address]  1945   [phone] (home)        Location:  [facility].    01/03/23     PCP:  Lauren Shah MD       Identification:  Torsten Jackson is a 77 y.o.  male       Chief Complaint: Consult for Atrial Flutter    Problem List:  1. Acute ischemic stroke  a. Status post TIA  b. Manifested as expressive aphasia, November 2021  c. [facility] 5-day admission for acute embolic CVA, November 2021, confirmed by head imaging including CT head, MRI brain, CTA Head, and CT cerebral perfusion. He was administered TPA. He had an acceptable MARISELA during admission with negative bubble study.  He was started on Exforge. He was also diagnosed with atrial fibrillation and he was started on amiodarone 200 mg daily as well as Eliquis and low-dose aspirin.  d. Residual class I symptoms, June 2022  2. Atrial fibrillation/Aflutter  a. Diagnosed during November 2021 admission  b. CHADSVASC=5  c. Initiation of amiodarone and Eliquis  d. Echocardiogram, November 2021: LVEF 0.60. TR with RVSP 25 mmHg. Saline test negative for right to left atrial level shunt. Mild concentric LVH. Grade II w/high LAP LV pseudonormalization. Moderate aortic valve sclerosis. No PFO.  e. Recent exertional dyspnea and fatigue of NYHA class II severity with acceptable Cardiolite GXT (LVEF 0.66), December 2021.  f. Ablation with Dr. Gonzales 03/02/2022: Successful radiofrequency ablation of right atrial isthmus dependent flutter with bidirectional isthmus block post-ablation.  g. Residual class I symptoms, June 2022  3. Bradycardia  4. Labile hypertension- probably essential  5. Glaucoma  6. BPH    No Known Allergies      Current Outpatient Medications:   •  apixaban (ELIQUIS) 5 MG tablet tablet, Take 1 tablet by mouth Every 12 (Twelve) Hours. (Patient taking differently: Take 5 mg by mouth Every 12 (Twelve) Hours. Last dose per Dr. Gonzales's  orders 3/1/22 morning  Indications: Other - full anticoagulation, Visible clot on MRI), Disp: 60 tablet, Rfl: 2  •  aspirin 81 MG EC tablet, Take 1 tablet by mouth Daily., Disp: 60 tablet, Rfl: 2  •  atorvastatin (LIPITOR) 40 MG tablet, Take 1 tablet by mouth Every Night., Disp: 90 tablet, Rfl: 2  •  multivitamin (THERAGRAN) tablet tablet, Take 1 tablet by mouth Daily., Disp: , Rfl:   •  metoprolol tartrate (LOPRESSOR) 25 MG tablet, Take 1 tablet by mouth 2 (Two) Times a Day., Disp: 60 tablet, Rfl: 11    Atrial Fibrillation  Symptoms include shortness of breath. Symptoms are negative for chest pain, dizziness and palpitations. Past medical history includes atrial fibrillation.     Torsten Jackson is a 77 y.o. patient presents for follow-up of atrial flutter.  He is status post successful EP study and RFA of typical right atrial isthmus dependent flutter ablation on March 2, 2022..  Since we last saw the pt, he states he is now having increased palpitations, checking his pulse he has noticed some irregularity. This has been going on for past few months. He is not overly symptomatic. He still works out 4 days a week and feels pretty good.  His only complaint sometimes his heart rate gets quite elevated specimen tries to run 1 30-1 40.  Other days he feels like he can run and do fine with no symptoms at all.  He is not any dizziness near syncope single.  He has been very compliant with his Eliquis therapy.         height is 188 cm (74\") and weight is 91.2 kg (201 lb). His blood pressure is 106/72 and his pulse is 104. His oxygen saturation is 99%.   Vitals and nursing note reviewed.   Constitutional:       Appearance: Normal appearance. Well-developed.   Pulmonary:      Effort: Pulmonary effort is normal.      Breath sounds: Normal breath sounds. No wheezing. No rhonchi. No rales.   Cardiovascular:      Tachycardia present. Occasional ectopic beats. Irregular rhythm.   Pulses:     Intact distal pulses.   Edema:      Peripheral edema absent.   Abdominal:      General: Bowel sounds are normal.      Palpations: Abdomen is soft.   Neurological:      Mental Status: Alert.             ECG 12 Lead    Date/Time: 1/3/2023 9:13 AM  Performed by: Humphrey Mackenzie PA  Authorized by: Humphrey Mackenzie PA   Comparison: compared with previous ECG from 6/14/2022  Rhythm: atrial fibrillation  Rate: tachycardic  ST Segments: ST segments normal    Clinical impression: abnormal EKG             Assessment/ Plan   Diagnoses and all orders for this visit:    1. Atrial flutter with controlled response (HCC) (Primary): Successful EP study and RFA of typical right atrial dependent atrial flutter March 2, 2022.      2.  Recurrent A. fib on EKG today.  Heart rate 120 bpm.    3. 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    3. Essential hypertension: Controlled.  Reports actually his blood pressures been marginal on Diovan.      Plan: Patient's recurrent A. fib today some of his symptoms occur when heart rate is elevated especially when exercising.  He has known history of bradycardia when in sinus rhythm.  We may trial an element of rate control with a very low-dose beta-blocker to see if he can tolerate this.  Other options would be consider cardioversion, pacemaker and then consideration of antiarrhythmic drug.  For now we will trial a conservative approach with low-dose beta-blocker.  In view of marginal blood pressure he would hold his Diovan at this time.  He will have a ZIO monitor and return follow-up in 2 to 3 months.  Electronically signed by MCKAYLA Pena, 01/03/23, 9:32 AM EST.

## 2023-02-09 ENCOUNTER — OFFICE VISIT (OUTPATIENT)
Dept: CARDIOLOGY | Facility: CLINIC | Age: 78
End: 2023-02-09
Payer: MEDICARE

## 2023-02-09 VITALS
DIASTOLIC BLOOD PRESSURE: 60 MMHG | SYSTOLIC BLOOD PRESSURE: 130 MMHG | RESPIRATION RATE: 12 BRPM | OXYGEN SATURATION: 98 % | BODY MASS INDEX: 25.8 KG/M2 | HEIGHT: 74 IN | HEART RATE: 65 BPM | WEIGHT: 201 LBS

## 2023-02-09 DIAGNOSIS — E78.2 MIXED HYPERLIPIDEMIA: ICD-10-CM

## 2023-02-09 DIAGNOSIS — I48.92 ATRIAL FLUTTER WITH CONTROLLED RESPONSE: Primary | ICD-10-CM

## 2023-02-09 DIAGNOSIS — I10 ESSENTIAL HYPERTENSION: ICD-10-CM

## 2023-02-09 PROCEDURE — 99214 OFFICE O/P EST MOD 30 MIN: CPT | Performed by: NURSE PRACTITIONER

## 2023-04-04 ENCOUNTER — OFFICE VISIT (OUTPATIENT)
Dept: CARDIOLOGY | Facility: CLINIC | Age: 78
End: 2023-04-04
Payer: MEDICARE

## 2023-04-04 VITALS
BODY MASS INDEX: 25.12 KG/M2 | HEIGHT: 75 IN | DIASTOLIC BLOOD PRESSURE: 62 MMHG | OXYGEN SATURATION: 98 % | WEIGHT: 202 LBS | SYSTOLIC BLOOD PRESSURE: 142 MMHG | HEART RATE: 55 BPM

## 2023-04-04 DIAGNOSIS — I48.0 PAROXYSMAL ATRIAL FIBRILLATION: Primary | ICD-10-CM

## 2023-04-04 DIAGNOSIS — I48.92 ATRIAL FLUTTER, UNSPECIFIED TYPE: ICD-10-CM

## 2023-04-04 DIAGNOSIS — I10 ESSENTIAL HYPERTENSION: ICD-10-CM

## 2023-04-04 DIAGNOSIS — Z79.01 CHRONIC ANTICOAGULATION: ICD-10-CM

## 2023-04-04 PROBLEM — D64.9 ANEMIA: Status: RESOLVED | Noted: 2022-12-01 | Resolved: 2023-04-04

## 2023-04-04 PROBLEM — E66.3 OVERWEIGHT: Status: RESOLVED | Noted: 2022-12-01 | Resolved: 2023-04-04

## 2023-04-04 PROBLEM — E87.5 HYPERKALEMIA: Status: RESOLVED | Noted: 2019-11-10 | Resolved: 2023-04-04

## 2023-04-04 PROBLEM — I63.9 ACUTE CVA (CEREBROVASCULAR ACCIDENT): Status: RESOLVED | Noted: 2021-11-08 | Resolved: 2023-04-04

## 2023-04-04 PROCEDURE — 3078F DIAST BP <80 MM HG: CPT | Performed by: STUDENT IN AN ORGANIZED HEALTH CARE EDUCATION/TRAINING PROGRAM

## 2023-04-04 PROCEDURE — 3077F SYST BP >= 140 MM HG: CPT | Performed by: STUDENT IN AN ORGANIZED HEALTH CARE EDUCATION/TRAINING PROGRAM

## 2023-04-04 PROCEDURE — 1159F MED LIST DOCD IN RCRD: CPT | Performed by: STUDENT IN AN ORGANIZED HEALTH CARE EDUCATION/TRAINING PROGRAM

## 2023-04-04 PROCEDURE — 1160F RVW MEDS BY RX/DR IN RCRD: CPT | Performed by: STUDENT IN AN ORGANIZED HEALTH CARE EDUCATION/TRAINING PROGRAM

## 2023-04-04 PROCEDURE — 99214 OFFICE O/P EST MOD 30 MIN: CPT | Performed by: STUDENT IN AN ORGANIZED HEALTH CARE EDUCATION/TRAINING PROGRAM

## 2023-04-04 NOTE — PROGRESS NOTES
Encounter Date:04/04/2023      Patient ID: Torsten Jackson is a 78 y.o. male.    Lauren Shah MD    Chief Complaint: No chief complaint on file.      PROBLEM LIST:  Patient Active Problem List    Diagnosis Date Noted   • Atrial flutter 02/08/2022     Priority: High     Note Last Updated: 4/4/2023     · Diagnosed November 2021 during admission for CVA- Eliquis, ASA, and Multaq.  Multaq was not available so Amiodarone was initiated.   · CHADS2 Vasc = 5, on eliquis  · Echo November 2021: LVEF 0.60. TR with RVSP 25 mmHg. Mild concentric LVH. Grade II w/high LAP LV pseudonormalization. Moderate aortic valve   · electrophysiology study, with LA pacing and recording, with radiofrequency ablation of right atrial isthmus dependent flutter. 3/2/2022     • Essential hypertension 11/10/2021     Priority: Low   • Anemia 12/01/2022   • Mixed hyperlipidemia 12/01/2022   • Overweight 12/01/2022   • Gastroesophageal reflux disease without esophagitis 03/08/2022   • History of cerebrovascular accident 11/30/2021   • PAF with rapid ventricular response. New diagnosis 11/11/2021 (HCC) 11/11/2021   • Intracranial cerebrovascular disease in bilateral MCA territories 11/10/2021   • Nocturnal oxygen desaturation this admission.  Rule out SATYA 11/10/2021   • History of glaucoma crisis 2016 resolved post bilateral cataract extraction 11/08/2021   • BPH (benign prostatic hyperplasia).  Not on therapy 11/08/2021   • Anisocoria 11/10/2019   • Raised prostate specific antigen 10/30/2017               History of Present Illness  Patient presents today for follow-up with a history of     No Known Allergies    Current Outpatient Medications   Medication Instructions   • Adult Aspirin Regimen 81 mg, Oral, Daily   • apixaban (ELIQUIS) 5 MG tablet tablet Take 1 tablet by mouth Every 12 (Twelve) Hours.   • atorvastatin (LIPITOR) 40 mg, Oral, Nightly   • metoprolol tartrate (LOPRESSOR) 25 mg, Oral, 2 Times Daily   • multivitamin  (THERAGRAN) tablet tablet 1 tablet, Oral, Daily       .    Objective:     There were no vitals taken for this visit.   There is no height or weight on file to calculate BMI.     Physical Exam    Lab Review:                           Procedures               Assessment:   No diagnosis found.  Plan:     Stable cardiac status.  Continue current medications.   {CARDIO RETURN TO CLINIC:54425}, sooner as needed.  Thank you for allowing us to participate in the care of your patient.     ***

## 2023-04-04 NOTE — PROGRESS NOTES
Encounter Date:04/04/2023      Patient ID: Torsten Jackson is a 78 y.o. male.    Laurne Shah MD    Chief Complaint: Atrial Flutter and Atrial Fibrillation      PROBLEM LIST:  Patient Active Problem List    Diagnosis Date Noted   • Atrial flutter 02/08/2022     Priority: High     Note Last Updated: 4/4/2023     · Diagnosed November 2021 during admission for CVA- Eliquis, ASA, and Multaq.  Multaq was not available so Amiodarone was initiated.   · CHADS2 Vasc = 5, on eliquis  · Echo November 2021: LVEF 0.60. TR with RVSP 25 mmHg. Mild concentric LVH. Grade II w/high LAP LV pseudonormalization. Moderate aortic valve   · electrophysiology study, with LA pacing and recording, with radiofrequency ablation of right atrial isthmus dependent flutter. 3/2/2022     • Paroxysmal atrial fibrillation 11/11/2021     Priority: High     Note Last Updated: 4/4/2023     · 14-day monitor, 1/3/2023: 16% atrial fibrillation burden     • Chronic anticoagulation 04/04/2023     Priority: Low   • Essential hypertension 11/10/2021     Priority: Low   • Mixed hyperlipidemia 12/01/2022   • Gastroesophageal reflux disease without esophagitis 03/08/2022   • History of cerebrovascular accident 11/30/2021   • Intracranial cerebrovascular disease in bilateral MCA territories 11/10/2021   • Nocturnal oxygen desaturation this admission.  Rule out SATYA 11/10/2021   • History of glaucoma crisis 2016 resolved post bilateral cataract extraction 11/08/2021   • BPH (benign prostatic hyperplasia).  Not on therapy 11/08/2021   • Anisocoria 11/10/2019   • Raised prostate specific antigen 10/30/2017               History of Present Illness  Patient presents today for follow-up with a history of atrial flutter status post ablation and atrial fibrillation on chronic anticoagulation.  He presents today for scheduled follow-up.  Since we last saw him in the EP clinic he had a 2-week ZIO which showed a 16% atrial fibrillation burden which was highly  "symptomatic.  He was started on metoprolol 25 mg twice daily which has significantly reduced his A-fib symptoms.  Unfortunately, he feels that this is also reducing his heart rate a little too much and is impairing his running ability and duration.  His blood pressures at home typically run between 120 to 130 mmHg.  He has had no awareness of palpitations in the past 2 to 3 weeks.  No dizziness no syncope.  He states compliance with current medical regimen reports no significant adverse side effects.    No Known Allergies    Current Outpatient Medications   Medication Instructions   • Adult Aspirin Regimen 81 mg, Oral, Daily   • apixaban (ELIQUIS) 5 MG tablet tablet Take 1 tablet by mouth Every 12 (Twelve) Hours.   • atorvastatin (LIPITOR) 40 mg, Oral, Nightly   • metoprolol tartrate (LOPRESSOR) 25 mg, Oral, 2 Times Daily   • multivitamin (THERAGRAN) tablet tablet 1 tablet, Oral, Daily       .    Objective:     /62 (BP Location: Left arm, Patient Position: Sitting)   Pulse 55   Ht 190.5 cm (75\")   Wt 91.6 kg (202 lb)   SpO2 98%   BMI 25.25 kg/m²    Body mass index is 25.25 kg/m².     Vitals reviewed.   Constitutional:       Appearance: Well-developed.   Pulmonary:      Effort: Pulmonary effort is normal. No respiratory distress.      Breath sounds: Normal breath sounds. No wheezing. No rales.      Comments: Bases clear  Chest:      Chest wall: Not tender to palpatation.   Cardiovascular:      Normal rate. Regular rhythm.      Murmurs: There is no murmur.      No gallop. No click. No rub.   Pulses:     Intact distal pulses.   Musculoskeletal: Normal range of motion.       Lab Review:                           Procedures               Assessment:      Diagnosis Plan   1. Paroxysmal atrial fibrillation   reduce metoprolol to tartrate to 12.5 mg twice daily.  We have discussed multiple other options to include continuation of metoprolol and trial of antiarrhythmics versus pulmonary vein isolation.  For now it " is his preference to reduce metoprolol in half.      2. Atrial flutter, unspecified type   no known recurrence of atrial flutter since ablation      3. Essential hypertension   well managed on current medical regimen   4.       Chronic anticoagulation                                           no bleeding, continue Eliquis 5 mg twice daily     Plan:     Stable cardiac status.  Continue current medications.   in 6 months, sooner as needed.  Thank you for allowing us to participate in the care of your patient.     Electronically signed by MCKAYLA Alex, 04/04/23, 9:45 AM EDT.

## 2023-04-04 NOTE — LETTER
April 4, 2023     Lauren Shah MD  [address] [facility] [address]    Patient: Torsten Jackson   YOB: 1945   Date of Visit: 4/4/2023       Dear Dr. Alyssa MD:    Thank you for referring Torsten Jackson to me for evaluation. Below are the relevant portions of my assessment and plan of care.    If you have questions, please do not hesitate to call me. I look forward to following Torsten along with you.         Sincerely,        Marty Gonzales MD        CC: No Recipients    Humphrey Snow PA  04/04/23 0946  Signed          Encounter Date:04/04/2023     Patient ID: Torsten Jackson is a 78 y.o. male.    Lauren Shah MD    Chief Complaint: Atrial Flutter and Atrial Fibrillation      PROBLEM LIST:  Patient Active Problem List    Diagnosis Date Noted   • Atrial flutter 02/08/2022     Priority: High     Note Last Updated: 4/4/2023     · Diagnosed November 2021 during admission for CVA- Eliquis, ASA, and Multaq.  Multaq was not available so Amiodarone was initiated.   · CHADS2 Vasc = 5, on eliquis  · Echo November 2021: LVEF 0.60. TR with RVSP 25 mmHg. Mild concentric LVH. Grade II w/high LAP LV pseudonormalization. Moderate aortic valve   · electrophysiology study, with LA pacing and recording, with radiofrequency ablation of right atrial isthmus dependent flutter. 3/2/2022     • Paroxysmal atrial fibrillation 11/11/2021     Priority: High     Note Last Updated: 4/4/2023     · 14-day monitor, 1/3/2023: 16% atrial fibrillation burden     • Chronic anticoagulation 04/04/2023     Priority: Low   • Essential hypertension 11/10/2021     Priority: Low   • Mixed hyperlipidemia 12/01/2022   • Gastroesophageal reflux disease without esophagitis 03/08/2022   • History of cerebrovascular accident 11/30/2021   • Intracranial cerebrovascular disease in bilateral MCA territories 11/10/2021   • Nocturnal oxygen desaturation this admission.  Rule out SATYA 11/10/2021   • History of glaucoma crisis  "2016 resolved post bilateral cataract extraction 11/08/2021   • BPH (benign prostatic hyperplasia).  Not on therapy 11/08/2021   • Anisocoria 11/10/2019   • Raised prostate specific antigen 10/30/2017               History of Present Illness  Patient presents today for follow-up with a history of atrial flutter status post ablation and atrial fibrillation on chronic anticoagulation.  He presents today for scheduled follow-up.  Since we last saw him in the EP clinic he had a 2-week ZIO which showed a 16% atrial fibrillation burden which was highly symptomatic.  He was started on metoprolol 25 mg twice daily which has significantly reduced his A-fib symptoms.  Unfortunately, he feels that this is also reducing his heart rate a little too much and is impairing his running ability and duration.  His blood pressures at home typically run between 120 to 130 mmHg.  He has had no awareness of palpitations in the past 2 to 3 weeks.  No dizziness no syncope.  He states compliance with current medical regimen reports no significant adverse side effects.    No Known Allergies    Current Outpatient Medications   Medication Instructions   • Adult Aspirin Regimen 81 mg, Oral, Daily   • apixaban (ELIQUIS) 5 MG tablet tablet Take 1 tablet by mouth Every 12 (Twelve) Hours.   • atorvastatin (LIPITOR) 40 mg, Oral, Nightly   • metoprolol tartrate (LOPRESSOR) 25 mg, Oral, 2 Times Daily   • multivitamin (THERAGRAN) tablet tablet 1 tablet, Oral, Daily       .    Objective:     /62 (BP Location: Left arm, Patient Position: Sitting)   Pulse 55   Ht 190.5 cm (75\")   Wt 91.6 kg (202 lb)   SpO2 98%   BMI 25.25 kg/m²   Body mass index is 25.25 kg/m².     Vitals reviewed.   Constitutional:       Appearance: Well-developed.   Pulmonary:      Effort: Pulmonary effort is normal. No respiratory distress.      Breath sounds: Normal breath sounds. No wheezing. No rales.      Comments: Bases clear  Chest:      Chest wall: Not tender to " palpatation.   Cardiovascular:      Normal rate. Regular rhythm.      Murmurs: There is no murmur.      No gallop. No click. No rub.   Pulses:     Intact distal pulses.   Musculoskeletal: Normal range of motion.       Lab Review:                           Procedures              Assessment:      Diagnosis Plan   1. Paroxysmal atrial fibrillation   reduce metoprolol to tartrate to 12.5 mg twice daily.  We have discussed multiple other options to include continuation of metoprolol and trial of antiarrhythmics versus pulmonary vein isolation.  For now it is his preference to reduce metoprolol in half.      2. Atrial flutter, unspecified type   no known recurrence of atrial flutter since ablation      3. Essential hypertension   well managed on current medical regimen   4.       Chronic anticoagulation                                           no bleeding, continue Eliquis 5 mg twice daily     Plan:     Stable cardiac status.  Continue current medications.   in 6 months, sooner as needed.  Thank you for allowing us to participate in the care of your patient.     Electronically signed by MCKAYLA Alex, 04/04/23, 9:45 AM EDT.

## 2023-11-28 ENCOUNTER — OFFICE VISIT (OUTPATIENT)
Dept: CARDIOLOGY | Facility: CLINIC | Age: 78
End: 2023-11-28
Payer: MEDICARE

## 2023-11-28 VITALS
SYSTOLIC BLOOD PRESSURE: 160 MMHG | BODY MASS INDEX: 24.97 KG/M2 | WEIGHT: 200.8 LBS | HEIGHT: 75 IN | DIASTOLIC BLOOD PRESSURE: 80 MMHG | OXYGEN SATURATION: 100 % | HEART RATE: 55 BPM

## 2023-11-28 DIAGNOSIS — I48.0 PAROXYSMAL ATRIAL FIBRILLATION: Primary | ICD-10-CM

## 2023-11-28 DIAGNOSIS — Z79.01 CHRONIC ANTICOAGULATION: ICD-10-CM

## 2023-11-28 DIAGNOSIS — I48.92 ATRIAL FLUTTER, UNSPECIFIED TYPE: ICD-10-CM

## 2023-11-28 RX ORDER — FLUTICASONE PROPIONATE 50 MCG
1 SPRAY, SUSPENSION (ML) NASAL DAILY
COMMUNITY
Start: 2023-09-02

## 2023-11-28 NOTE — PROGRESS NOTES
Cardiac Electrophysiology Outpatient Note  [facility]    Office Visit     Torsten Jackson  [id number]  11/28/2023    Primary Care Physician: Lauren Shah MD    Referred By: No ref. provider found    Subjective     Chief Complaint   Patient presents with    Atrial flutter with controlled response       History of Present Illness:   Torsten Jackson is a 78 y.o. male who presents to my electrophysiology clinic for follow up of atrial flutter status post ablation and atrial fibrillation on chronic anticoagulation.  At his last visit, he had recently completed a Holter monitor showing 16% atrial fibrillation and he reported that he was fairly symptomatic with this.  He also felt that his metoprolol tartrate 25 mg twice daily was reducing his heart rate a little bit too much impairing his running ability and duration.  Several options were presented to him including decreasing metoprolol dosage, initiating antiarrhythmic therapy or pursuing catheter ablation.  At that time, he elected to decrease metoprolol and see how he does.  Since we last saw the patient, he has been doing very well fro ma cardiovascular standpoint. He can usually tell when he is out of rhythm as he gets short of breath a little bit quicker with activity.  He feels that this happens a couple times a week.  He continues to be very active and works out 4 times a week with an additional multi mile jog 3 times a week.  He has found that if he takes time to warm up that he typically has no significant functional limitations. He denies any chest pain, dyspnea at rest or lightheaded/dizzy episodes.    Past Medical History:   Diagnosis Date    Abnormal ECG November 2021    Arrhythmia November 2021    Atrial fibrillation November 2021    Atrial flutter    Atrial flutter     Blood pressure elevated without history of HTN     Essential hypertension 11/10/2021    GERD (gastroesophageal reflux disease)      Glaucoma     Mixed hyperlipidemia 2022    Stroke        Past Surgical History:   Procedure Laterality Date    ABLATION OF DYSRHYTHMIC FOCUS  2022    CARDIAC ELECTROPHYSIOLOGY PROCEDURE N/A 2022    Procedure: Ablation atrial flutter. Hold Eliquis the night before the procedure. The patient already stopped Amiodarone.;  Surgeon: Marty Gonzales MD;  Location: [facility] INVASIVE LOCATION;  Service: Cardiovascular;  Laterality: N/A;    CATARACT EXTRACTION, BILATERAL      COLONOSCOPY      CYST REMOVAL      HERNIA REPAIR      POLYPECTOMY         Family History   Problem Relation Age of Onset    Hypertension Mother             Prostate cancer Father     Atrial fibrillation Father 80    No Known Problems Sister     Prostate cancer Brother     Thyroid disease Brother        Social History     Socioeconomic History    Marital status:    Tobacco Use    Smoking status: Never    Smokeless tobacco: Never   Vaping Use    Vaping Use: Never used   Substance and Sexual Activity    Alcohol use: Yes     Alcohol/week: 2.0 standard drinks of alcohol     Types: 1 Glasses of wine, 1 Cans of beer per week     Comment: ocass    Drug use: Never    Sexual activity: Yes     Partners: Female         Current Outpatient Medications:     apixaban (ELIQUIS) 5 MG tablet tablet, Take 1 tablet by mouth Every 12 (Twelve) Hours. (Patient taking differently: Take 1 tablet by mouth Every 12 (Twelve) Hours. Last dose per Dr. Gonzales's orders 3/1/22 morning  Indications: Other - full anticoagulation, Visible clot on MRI), Disp: 60 tablet, Rfl: 2    aspirin 81 MG EC tablet, Take 1 tablet by mouth Daily., Disp: 60 tablet, Rfl: 2    atorvastatin (LIPITOR) 40 MG tablet, Take 1 tablet by mouth Every Night., Disp: 90 tablet, Rfl: 2    fluticasone (FLONASE) 50 MCG/ACT nasal spray, 1 spray into the nostril(s) as directed by provider Daily., Disp: , Rfl:     metoprolol tartrate (LOPRESSOR) 25 MG tablet, Take 0.5 tablets by mouth 2  "(Two) Times a Day., Disp: 90 tablet, Rfl: 3    multivitamin (THERAGRAN) tablet tablet, Take 1 tablet by mouth Daily., Disp: , Rfl:     Allergies:   No Known Allergies    Objective   Vital Signs: Blood pressure 160/80, pulse 55, height 190.5 cm (75\"), weight 91.1 kg (200 lb 12.8 oz), SpO2 100%.    PHYSICAL EXAM  General appearance: Awake, alert, cooperative  Head: Normocephalic, without obvious abnormality, atraumatic  Neck: No JVD  Lungs: Clear to ascultation bilaterally  Heart: Regular rate and rhythm, no murmurs, 2+ LE pulses, no lower extremity swelling  Skin: Skin color, turgor normal, no rashes or lesions  Neurologic: Grossly normal     Lab Results   Component Value Date    GLUCOSE 117 (H) 02/25/2022    CALCIUM 9.2 02/25/2022     02/25/2022    K 4.9 02/25/2022    CO2 28.0 02/25/2022     02/25/2022    BUN 17 02/25/2022    CREATININE 1.10 02/25/2022    EGFRIFNONA 65 02/25/2022    BCR 15.5 02/25/2022    ANIONGAP 6.0 02/25/2022     Lab Results   Component Value Date    WBC 6.19 02/25/2022    HGB 13.4 02/25/2022    HCT 39.3 02/25/2022    MCV 89.7 02/25/2022     02/25/2022     No results found for: \"INR\", \"PROTIME\"  Lab Results   Component Value Date    TSH 2.240 11/11/2021          Results for orders placed during the hospital encounter of 11/08/21    Adult Transesophageal Echo (MARISELA) W/ Cont if Necessary Per Protocol    Interpretation Summary  · Estimated left ventricular EF = 55% Left ventricular systolic function is normal.  · Left ventricular wall thickness is consistent with mild concentric hypertrophy  · The left atrial cavity is mild to moderately dilated  · No evidence of a left atrial appendage thrombus was present.  · No evidence of a patent foramen ovale. No evidence of an atrial septal defect present. Saline test results are negative.  · The aortic valve exhibits sclerosis.  · Mild mitral annular calcification is present. Mild mitral valve regurgitation is present  · There is (grade 1) " plaque in the descending aorta present.  · No obvious cardiac source of emboli demonstrated on this study.         I personally viewed and interpreted the patient's EKG/Telemetry/lab data    Procedures    Torsten Jackson  reports that he has never smoked. He has never used smokeless tobacco..    Advance Care Planning   Advance Care Planning: ACP discussion was declined by the patient. Patient has an advance directive in EMR which is still valid.      Assessment & Plan    1. Paroxysmal atrial fibrillation    Patient minimally symptomatic with his atrial fibrillation.  Overall, he is very happy with his functional abilities and continues to exercise 6 times a week.  He wants to continue rate control strategy with low-dose metoprolol.  We discussed options in the future if his symptoms become more problematic such as antiarrhythmic medications versus repeat catheter ablation.  Defer these options at this time.    2. Atrial flutter, unspecified type  S/p atrial flutter ablation 3/22.  No known recurrence.    3. Chronic anticoagulation  Denies any dark tarry stools or other signs of bleeding  Continue Eliquis 5 mg twice daily for stroke prophylaxis    4. Essential hypertension  BP elevated in office today 160/80.  He reports he checks intermittently and usually has numbers below 140 systolic but admits he has checked less frequently recently.  I instructed the patient to take a BP log for the next 2 weeks and call our office or his PCPs office with results.  He is agreeable.       Follow Up:  Return in about 9 months (around 8/28/2024).      Thank you for allowing me to participate in the care of your patient. Please do not hesitate to contact me with additional questions or concerns.      Valeriano Bond PA-C  Cardiac Electrophysiology  [facility]

## 2024-09-10 ENCOUNTER — OFFICE VISIT (OUTPATIENT)
Dept: CARDIOLOGY | Facility: CLINIC | Age: 79
End: 2024-09-10
Payer: MEDICARE

## 2024-09-10 VITALS
DIASTOLIC BLOOD PRESSURE: 68 MMHG | BODY MASS INDEX: 24.99 KG/M2 | SYSTOLIC BLOOD PRESSURE: 120 MMHG | HEIGHT: 75 IN | WEIGHT: 201 LBS | OXYGEN SATURATION: 99 % | HEART RATE: 41 BPM

## 2024-09-10 DIAGNOSIS — I10 ESSENTIAL HYPERTENSION: Chronic | ICD-10-CM

## 2024-09-10 DIAGNOSIS — I48.0 PAROXYSMAL ATRIAL FIBRILLATION: Primary | Chronic | ICD-10-CM

## 2024-09-10 DIAGNOSIS — R00.1 ASYMPTOMATIC BRADYCARDIA: Chronic | ICD-10-CM

## 2024-09-10 PROCEDURE — 1160F RVW MEDS BY RX/DR IN RCRD: CPT

## 2024-09-10 PROCEDURE — 3078F DIAST BP <80 MM HG: CPT

## 2024-09-10 PROCEDURE — 93000 ELECTROCARDIOGRAM COMPLETE: CPT

## 2024-09-10 PROCEDURE — 99214 OFFICE O/P EST MOD 30 MIN: CPT

## 2024-09-10 PROCEDURE — 3074F SYST BP LT 130 MM HG: CPT

## 2024-09-10 PROCEDURE — 1159F MED LIST DOCD IN RCRD: CPT

## 2024-09-10 RX ORDER — FLOMAX 0.4 MG/1
CAPSULE ORAL
COMMUNITY
Start: 2024-06-06

## 2024-09-10 RX ORDER — METOPROLOL TARTRATE 50 MG
75 TABLET ORAL 2 TIMES DAILY
COMMUNITY

## 2024-09-10 NOTE — PROGRESS NOTES
Cardiac Electrophysiology Outpatient Note  [facility] at [facility]    Office Visit     Torsten Jackson  [id number]  09/10/2024    Primary Care Physician: Lauren Shah MD    Referred By: No ref. provider found    Subjective     Chief Complaint   Patient presents with    Atrial flutter with controlled response       History of Present Illness:   Torsten Jackson is a 79 y.o. male who presents to my electrophysiology clinic for follow up of atrial flutter status post ablation and atrial fibrillation on chronic anticoagulation.  Since we last saw the patient approximately 9 months ago, he reports he is doing well.  He has found a sweet spot with metoprolol 75 mg twice daily where his paroxysmal atrial fibrillation is symptomatically controlled and his functional capacity is acceptable.  He is a runner.  Now he runs 2 days a week for 2-4 miles and is doing well with this.  He denies any chest pain, lower extremity edema or lightheadedness/dizziness.  He does have some fatigue, palpitations and shortness of breath on occasion but it is tolerable.    Past Medical History:   Diagnosis Date    Abnormal ECG November 2021    Arrhythmia November 2021    Atrial fibrillation November 2021    Atrial flutter    Atrial flutter     Blood pressure elevated without history of HTN     Essential hypertension 11/10/2021    GERD (gastroesophageal reflux disease)     Glaucoma     Mixed hyperlipidemia 12/01/2022    Stroke        Past Surgical History:   Procedure Laterality Date    ABLATION OF DYSRHYTHMIC FOCUS  March 2, 2022    CARDIAC ELECTROPHYSIOLOGY PROCEDURE N/A 03/02/2022    Procedure: Ablation atrial flutter. Hold Eliquis the night before the procedure. The patient already stopped Amiodarone.;  Surgeon: Marty Gonzales MD;  Location: [facility] INVASIVE LOCATION;  Service: Cardiovascular;  Laterality: N/A;    CATARACT EXTRACTION, BILATERAL      COLONOSCOPY      CYST REMOVAL      HERNIA REPAIR    "   POLYPECTOMY         Family History   Problem Relation Age of Onset    Hypertension Mother             Prostate cancer Father     Atrial fibrillation Father 80    No Known Problems Sister     Prostate cancer Brother     Thyroid disease Brother        Social History     Socioeconomic History    Marital status:    Tobacco Use    Smoking status: Never    Smokeless tobacco: Never   Vaping Use    Vaping status: Never Used   Substance and Sexual Activity    Alcohol use: Yes     Alcohol/week: 2.0 standard drinks of alcohol     Types: 1 Glasses of wine, 1 Cans of beer per week     Comment: ocass    Drug use: Never    Sexual activity: Yes     Partners: Female         Current Outpatient Medications:     apixaban (ELIQUIS) 5 MG tablet tablet, Take 1 tablet by mouth Every 12 (Twelve) Hours. (Patient taking differently: Take 1 tablet by mouth Every 12 (Twelve) Hours. Last dose per Dr. Gonzales's orders 3/1/22 morning  Indications: Other - full anticoagulation, Visible clot on MRI), Disp: 60 tablet, Rfl: 2    aspirin 81 MG EC tablet, Take 1 tablet by mouth Daily., Disp: 60 tablet, Rfl: 2    atorvastatin (LIPITOR) 40 MG tablet, Take 1 tablet by mouth Every Night., Disp: 90 tablet, Rfl: 2    Flomax 0.4 MG capsule 24 hr capsule, , Disp: , Rfl:     fluticasone (FLONASE) 50 MCG/ACT nasal spray, 1 spray into the nostril(s) as directed by provider Daily., Disp: , Rfl:     multivitamin (THERAGRAN) tablet tablet, Take 1 tablet by mouth Daily., Disp: , Rfl:     metoprolol tartrate (LOPRESSOR) 50 MG tablet, Take 1.5 tablets by mouth 2 (Two) Times a Day., Disp: , Rfl:     Allergies:   No Known Allergies    Objective   Vital Signs: Blood pressure 120/68, pulse (!) 41, height 190.5 cm (75\"), weight 91.2 kg (201 lb), SpO2 99%.    PHYSICAL EXAM  General appearance: Awake, alert, cooperative  Head: Normocephalic, without obvious abnormality, atraumatic  Lungs: Clear to ascultation bilaterally  Heart: Bradycardic rate and regular " "rhythm, no murmurs, no lower extremity swelling  Skin: Skin color, turgor normal, no rashes or lesions  Neurologic: Grossly normal     Lab Results   Component Value Date    GLUCOSE 117 (H) 02/25/2022    CALCIUM 9.2 02/25/2022     02/25/2022    K 4.9 02/25/2022    CO2 28.0 02/25/2022     02/25/2022    BUN 17 02/25/2022    CREATININE 1.10 02/25/2022    EGFRIFNONA 65 02/25/2022    BCR 15.5 02/25/2022    ANIONGAP 6.0 02/25/2022     Lab Results   Component Value Date    WBC 6.19 02/25/2022    HGB 13.4 02/25/2022    HCT 39.3 02/25/2022    MCV 89.7 02/25/2022     02/25/2022     No results found for: \"INR\", \"PROTIME\"  Lab Results   Component Value Date    TSH 2.240 11/11/2021          Results for orders placed during the hospital encounter of 11/08/21    Adult Transesophageal Echo ([facility]) W/ Cont if Necessary Per Protocol    Interpretation Summary  · Estimated left ventricular EF = 55% Left ventricular systolic function is normal.  · Left ventricular wall thickness is consistent with mild concentric hypertrophy  · The left atrial cavity is mild to moderately dilated  · No evidence of a left atrial appendage thrombus was present.  · No evidence of a patent foramen ovale. No evidence of an atrial septal defect present. Saline test results are negative.  · The aortic valve exhibits sclerosis.  · Mild mitral annular calcification is present. Mild mitral valve regurgitation is present  · There is (grade 1) plaque in the descending aorta present.  · No obvious cardiac source of emboli demonstrated on this study.         I personally viewed and interpreted the patient's EKG/Telemetry/lab data    EKG     ECG 12 Lead    Date/Time: 9/10/2024 11:05 AM  Performed by: Valeriano Bond PA-C    Authorized by: Valeriano Bond PA-C  Comparison: compared with previous ECG from 1/3/2023  Comparison to previous ECG: Sinus bradycardia has replaced atrial fibrillation  Rhythm: sinus bradycardia  Rate: bradycardic  BPM: " 41    Clinical impression: normal ECG          Torsten Jackson  reports that he has never smoked. He has never used smokeless tobacco.        Advance Care Planning   Advance Care Planning: ACP discussion was declined by the patient. Patient has an advance directive in EMR which is still valid.      Assessment & Plan    1. Paroxysmal atrial fibrillation  Rate controlled.    We are unsure of his burden, but his symptoms are tolerable and nonlimiting to his daily activities and jogging.    Continue metoprolol tartrate 75 mg twice daily.    Continue Eliquis 5 mg twice daily for stroke prophylaxis.    We discussed rhythm control options with medications vs catheter ablation if his afib ever becomes more problematic. Our usual cutoff for an ablation would be 80 years old which the patient is approaching but he is healthier than the average person his age and could probably proceed with ablation into his 80s if he would like without significant increased risk.    2. Essential hypertension  BP controlled in the office today.  Continue current antihypertensive regimen    3. Asymptomatic bradycardia  Patient is a runner.  Heart rate sometimes runs in the 40s and often low 50s.  He is asymptomatic with this.  Will continue to monitor.       Follow Up:  Return in about 1 year (around 9/10/2025).      Thank you for allowing me to participate in the care of your patient. Please do not hesitate to contact me with additional questions or concerns.      Valeriano Bond PA-C  Cardiac Electrophysiology  [facility] / [facility]

## (undated) DEVICE — Device: Brand: VIZIGO

## (undated) DEVICE — ELECTRD RETRN/GRND MEGADYNE SGL/PLT W/CORD 9FT DISP

## (undated) DEVICE — ST EXT IV SMARTSITE 2VLV SP M LL 5ML IV1

## (undated) DEVICE — RADIFOCUS GLIDEWIRE ADVANTAGE GUIDEWIRE: Brand: GLIDEWIRE ADVANTAGE

## (undated) DEVICE — LIMB HOLDER, WRIST/ANKLE: Brand: DEROYAL

## (undated) DEVICE — Device: Brand: SMARTABLATE

## (undated) DEVICE — Device: Brand: MEDEX

## (undated) DEVICE — INTRO SHEATH ENGAGE W/50 GW .038 8F12

## (undated) DEVICE — Device: Brand: REFERENCE PATCH CARTO 3

## (undated) DEVICE — NDL PERC 1PART ECHOTIP WO/BASEPLT 18G 7CM

## (undated) DEVICE — INTRO SHEATH ART/FEM ENGAGE .035IN 9F 25CM

## (undated) DEVICE — INTRO SHEATH ENGAGE W/50 GW .038 9F12

## (undated) DEVICE — Device: Brand: SOUNDSTAR

## (undated) DEVICE — Device: Brand: DECANAV

## (undated) DEVICE — LEX ELECTRO PHYSIOLOGY: Brand: MEDLINE INDUSTRIES, INC.

## (undated) DEVICE — SYS CLS VASC/VENI VASCADE MVP 6TO12F

## (undated) DEVICE — ST INF PRI SMRTSTE 20DRP 2VLV 24ML 117

## (undated) DEVICE — CATH EP LIVEWIRE DUO-DECAPOLAR 7F 2-10-2MM SUP LNG 95CM

## (undated) DEVICE — Device: Brand: THERMOCOOL SMARTTOUCH SF

## (undated) DEVICE — CANN NASL CO2 DIVIDED A/

## (undated) DEVICE — GW XCHG AMPLTZ XSTIF PTFE CRV .035IN 3X180CM

## (undated) DEVICE — INTRO SHEATH FASTCATH 8.5F60CM

## (undated) DEVICE — PINNACLE R/O II INTRODUCER SHEATH WITH RADIOPAQUE MARKER: Brand: PINNACLE